# Patient Record
Sex: FEMALE | Race: WHITE | NOT HISPANIC OR LATINO | Employment: FULL TIME | URBAN - METROPOLITAN AREA
[De-identification: names, ages, dates, MRNs, and addresses within clinical notes are randomized per-mention and may not be internally consistent; named-entity substitution may affect disease eponyms.]

---

## 2017-02-22 ENCOUNTER — ALLSCRIPTS OFFICE VISIT (OUTPATIENT)
Dept: OTHER | Facility: OTHER | Age: 47
End: 2017-02-22

## 2017-02-28 RX ORDER — ALPRAZOLAM 0.25 MG/1
0.25 TABLET ORAL
COMMUNITY
End: 2018-12-07

## 2017-02-28 RX ORDER — MULTIVITAMIN
1 TABLET ORAL DAILY
COMMUNITY
End: 2020-06-30 | Stop reason: SDUPTHER

## 2017-03-09 ENCOUNTER — HOSPITAL ENCOUNTER (OUTPATIENT)
Facility: HOSPITAL | Age: 47
Setting detail: OUTPATIENT SURGERY
Discharge: HOME | End: 2017-03-09
Attending: SURGERY | Admitting: SURGERY
Payer: COMMERCIAL

## 2017-03-09 VITALS
WEIGHT: 180 LBS | SYSTOLIC BLOOD PRESSURE: 138 MMHG | HEART RATE: 82 BPM | TEMPERATURE: 98.7 F | BODY MASS INDEX: 28.25 KG/M2 | DIASTOLIC BLOOD PRESSURE: 76 MMHG | RESPIRATION RATE: 16 BRPM | OXYGEN SATURATION: 100 % | HEIGHT: 67 IN

## 2017-03-09 DIAGNOSIS — L72.9 FOLLICULAR CYST OF SKIN AND SUBCUTANEOUS TISSUE: ICD-10-CM

## 2017-03-09 PROCEDURE — 88304 TISSUE EXAM BY PATHOLOGIST: CPT | Performed by: SURGERY

## 2017-03-09 RX ORDER — LIDOCAINE HYDROCHLORIDE AND EPINEPHRINE 10; 10 MG/ML; UG/ML
1 INJECTION, SOLUTION INFILTRATION; PERINEURAL ONCE
Status: COMPLETED | OUTPATIENT
Start: 2017-03-09 | End: 2017-03-09

## 2017-03-09 RX ORDER — LIDOCAINE HYDROCHLORIDE AND EPINEPHRINE 10; 10 MG/ML; UG/ML
INJECTION, SOLUTION INFILTRATION; PERINEURAL
Status: DISCONTINUED
Start: 2017-03-09 | End: 2017-03-09 | Stop reason: HOSPADM

## 2017-03-17 ENCOUNTER — ALLSCRIPTS OFFICE VISIT (OUTPATIENT)
Dept: OTHER | Facility: OTHER | Age: 47
End: 2017-03-17

## 2017-03-24 ENCOUNTER — GENERIC CONVERSION - ENCOUNTER (OUTPATIENT)
Dept: OTHER | Facility: OTHER | Age: 47
End: 2017-03-24

## 2017-11-17 ENCOUNTER — LAB REQUISITION (OUTPATIENT)
Dept: LAB | Facility: HOSPITAL | Age: 47
End: 2017-11-17
Payer: COMMERCIAL

## 2017-11-17 ENCOUNTER — ALLSCRIPTS OFFICE VISIT (OUTPATIENT)
Dept: OTHER | Facility: OTHER | Age: 47
End: 2017-11-17

## 2017-11-17 DIAGNOSIS — R22.1 LOCALIZED SWELLING, MASS OR LUMP OF NECK: ICD-10-CM

## 2017-11-17 DIAGNOSIS — Z01.419 ENCOUNTER FOR GYNECOLOGICAL EXAMINATION WITHOUT ABNORMAL FINDING: ICD-10-CM

## 2017-11-17 DIAGNOSIS — Z12.31 ENCOUNTER FOR SCREENING MAMMOGRAM FOR MALIGNANT NEOPLASM OF BREAST: ICD-10-CM

## 2017-11-17 PROCEDURE — G0145 SCR C/V CYTO,THINLAYER,RESCR: HCPCS | Performed by: OBSTETRICS & GYNECOLOGY

## 2017-11-17 PROCEDURE — 87624 HPV HI-RISK TYP POOLED RSLT: CPT | Performed by: OBSTETRICS & GYNECOLOGY

## 2017-11-18 NOTE — PROGRESS NOTES
Assessment    1  Encounter for routine gynecological examination (2 31) (Z01 419)   2  Pap smear, as part of routine gynecological examination (V76 2) (Z01 419)   3  Visit for screening mammogram (V76 12) (Z12 31)    Plan  Encounter for routine gynecological examination    · Follow-up visit in 1 year Evaluation and Treatment  Follow-up  Status: Hold For -Scheduling  Requested for: [id number]   Ordered;Encounter for routine gynecological examination; Ordered By: Adiel Card Performed:  Due: [id number]  Neck nodule    · US THYROID; Status:Hold For - Scheduling,Retrospective By Protocol Authorization; Requested for:2017;    Perform:[facility]; [id number]; Last Updated [facility]; 2017 12:13:47 PM;Ordered;nodule; Ordered By:Padma Bazzi;  Pap smear, as part of routine gynecological examination    · (1) THIN PREP PAP WITH IMAGING; Status: In Progress - Specimen/DataCollected,Retrospective By Protocol Authorization;   Done: [id number]   Perform:[facility] In Office Collection; Order Comments:cervical, endocervical; HST:[id number]; Last Updated [facility]; 2017 11:13:12 AM;Ordered;smear, as part of routine gynecological examination; Ordered By:Padma Bazzi; Maturation index required? : No  : 10/26/2017  HPV? : Regardless of Interpretation  Visit for screening mammogram    · * MAMMO SCREENING BILATERAL W CAD; Status:Hold For - Scheduling,RetrospectiveBy Protocol Authorization; Requested for:2017;    Perform:[facility]; GEK:[id number]; Last Updated [facility]; 2017 11:13:12 AM;Ordered;for screening mammogram; Ordered By:Padma Bazzi; Discussion/Summary  healthy adult female Currently, she eats a healthy diet  cervical cancer screening is current Pap test was done today Breast cancer screening: monthly self breast exam was advised, mammogram is current and mammogram has been ordered   Advice and education were given regarding nutrition, aerobic exercise, calcium supplements, vitamin D supplements and sunscreen use  Normal GYN Exam  Stressed  ordered  Smear Done    Will check THYROID US for small nodule on the RIGHT Side of the neck  GYN Exam in 1 year  if any problems develop during the interim  The patient has the current Goals: [address]  The patent has the current Barriers: None  Patient is able to Self-Care  PATIENT EDUCATION RECORD She was given the following educational materials:  Ideas for a Healthy Life Style  The treatment plan was reviewed with the patient/guardian  The patient/guardian understands and agrees with the treatment plan     Self Referrals: No      Chief Complaint  ANNUAL EXAMhas no problem or concerns      History of Present Illness  HPI: Periods are 21-28 days    Some heavy PMS sxs  tolerable  Bowel and Bladder habits  pelvic or abdominal pain   GYN HM, Adult Female [facility]: The patient is being seen for a gynecology evaluation  The last health maintenance visit was 1 year(s) ago  General Health: The patient's health since the last visit is described as good  She has regular dental visits  -- She denies vision problems  -- She denies hearing loss  Lifestyle:  She consumes a diverse and healthy diet  -- She does not have any weight concerns  -- She exercises regularly  -- She does not use tobacco -- She denies drug use  Reproductive health: the patient is perimenopausal    Screening: cancer screening reviewed and current  metabolic screening reviewed and current  risk screening reviewed and current  Review of Systems   Constitutional: No fever, no chills, feels well, no tiredness, no recent weight gain or loss  ENT: no ear ache, no loss of hearing, no nosebleeds or nasal discharge, no sore throat or hoarseness  Cardiovascular: no complaints of slow or fast heart rate, no chest pain, no palpitations, no leg claudication or lower extremity edema    Respiratory: no complaints of shortness of breath, no wheezing, no dyspnea on exertion, no orthopnea or PND  Breasts: no complaints of breast pain, breast lump or nipple discharge  Gastrointestinal: no complaints of abdominal pain, no constipation, no nausea or diarrhea, no vomiting, no bloody stools  Genitourinary: no complaints of dysuria, no incontinence, no pelvic pain, no dysmenorrhea, no vaginal discharge or abnormal vaginal bleeding  Musculoskeletal: no complaints of arthralgia, no myalgia, no joint swelling or stiffness, no limb pain or swelling  Integumentary: no complaints of skin rash or lesion, no itching or dry skin, no skin wounds  Neurological: no complaints of headache, no confusion, no numbness or tingling, no dizziness or fainting  Active Problems    1  Back pain (724 5) (M54 9)   2  Cyst of skin (706 2) (L72 9)   3  Dysuria (788 1) (R30 0)   4  Encounter for routine gynecological examination (V72 31) (Z01 419)   5  Encounter for screening mammogram for malignant neoplasm of breast (V76 12) (Z12 31)   6  Endometrial hyperplasia (621 30) (N85 00)   7  Endometrial polyp (621 0) (N84 0)   8  Factor V Leiden (289 81) (D68 51)   9  Pap smear, as part of routine gynecological examination (V76 2) (Z01 419)   10  Pelvic pain in female (625 9) (R10 2)   11   Visit for screening mammogram (V76 12) (Z12 31)    Past Medical History     · History of Bulging Disc (L1 - L2) (722 10)   · History of Encounter for routine gynecological examination (V72 31) (Z01 419)   · History of Pap smear, as part of routine gynecological examination (V76 2) (Z01 419)   · History of Reflex sympathetic dystrophy of lower extremity (337 22) (G90 939)   · History of Reported Pap Smear   · History of Summary Of Previous Pregnancies  2  (Total No )   · History of Summary Of Previous Pregnancies Para 2  (Deliveries)    Surgical History   · History of  Section   · History of Foot Surgery   · History of Oral Surgery Tooth Extraction   · History of Tubal Ligation    Family History  Brother    · Family history of Thyroid cancer  Family History    · Family history of Breast Cancer (V16 3)   · Family history of Diverticulosis Of Intestine   · Family history of Osteoporosis (V17 81)   · Family history of Stroke Syndrome (V17 1)    Social History     · Being A Social Drinker   · Former smoker (F14 59) (M14 981)   · Quit Smoking in 2002  Larayne Chroman Smoked for 5 years, college years  Larayne Chroman Larayne Chroman Social, Less than a pack of    cigarettes a day   · Denied: History of Home Environment Domestic Violence    Current Meds   1  Zoloft TABS; Therapy: (Recorded:17Nov2017) to Recorded    Allergies  1  Erythromycin TABS    Vitals   Recorded: 50BRS9738 00:94PN   Systolic 624   Diastolic 72   Height 5 ft 7 in   Weight 195 lb    BMI Calculated 30 54   BSA Calculated 2       Physical Exam   Constitutional  General appearance: No acute distress, well appearing and well nourished  Neck  Neck: Normal, supple, trachea midline, no masses  -- Small Nodule on the RIGHT Side  Thyroid: Normal, no thyromegaly  Pulmonary  Respiratory effort: No increased work of breathing or signs of respiratory distress  Auscultation of lungs: Clear to auscultation  Cardiovascular  Auscultation of heart: Normal rate and rhythm, normal S1 and S2, no murmurs  Peripheral vascular exam: Normal pulses Throughout  Genitourinary  External genitalia: Normal and no lesions appreciated  Vagina: Normal, no lesions or dryness appreciated  Urethra: Normal    Urethral meatus: Normal    Bladder: Normal, soft, non-tender and no prolapse or masses appreciated  Cervix: Normal, no palpable masses  Uterus: Normal, non-tender, not enlarged, and no palpable masses  Adnexa/parametria: Normal, non-tender and no fullness or masses appreciated  Anus, perineum, and rectum: Normal sphincter tone, no masses, and no prolapse  Chest  Breasts: Normal and no dimpling or skin changes noted     Abdomen  Abdomen: Normal, non-tender, and no organomegaly noted  Liver and spleen: No hepatomegaly or splenomegaly  Examination for hernias: No hernias appreciated  Stool sample for occult blood: Negative  Lymphatic  Palpation of lymph nodes in neck, axillae, groin and/or other locations: No lymphadenopathy or masses noted  Skin  Skin and subcutaneous tissue: Normal skin turgor and no rashes     Palpation of skin and subcutaneous tissue: Normal    Psychiatric  Orientation to person, place, and time: Normal    Mood and affect: Normal        Provider Comments  Provider Comments:   Family doing well    Oldest is 15 Next 6      Signatures   Electronically signed by : ELISABETH Schroeder ; Nov 17 2017  1:28PM EST                       (Author)

## 2017-11-22 ENCOUNTER — HOSPITAL ENCOUNTER (OUTPATIENT)
Dept: RADIOLOGY | Facility: HOSPITAL | Age: 47
Discharge: HOME | End: 2017-11-22
Attending: OBSTETRICS & GYNECOLOGY
Payer: COMMERCIAL

## 2017-11-22 DIAGNOSIS — R22.1 LOCALIZED SWELLING, MASS OR LUMP OF NECK: ICD-10-CM

## 2017-11-22 LAB — HPV RRNA GENITAL QL NAA+PROBE: NORMAL

## 2017-11-22 PROCEDURE — 76536 US EXAM OF HEAD AND NECK: CPT

## 2017-11-27 LAB
LAB AP GYN PRIMARY INTERPRETATION: NORMAL
LAB AP LMP: NORMAL
Lab: NORMAL
PATH INTERP SPEC-IMP: NORMAL

## 2017-12-08 ENCOUNTER — HOSPITAL ENCOUNTER (OUTPATIENT)
Dept: MAMMOGRAPHY | Facility: CLINIC | Age: 47
Discharge: HOME | End: 2017-12-08
Payer: COMMERCIAL

## 2017-12-08 DIAGNOSIS — Z12.31 ENCOUNTER FOR SCREENING MAMMOGRAM FOR MALIGNANT NEOPLASM OF BREAST: ICD-10-CM

## 2017-12-08 PROCEDURE — G0202 SCR MAMMO BI INCL CAD: HCPCS

## 2017-12-08 PROCEDURE — 77063 BREAST TOMOSYNTHESIS BI: CPT

## 2018-01-14 VITALS — WEIGHT: 168.4 LBS | HEIGHT: 67 IN | BODY MASS INDEX: 26.43 KG/M2

## 2018-01-14 VITALS
SYSTOLIC BLOOD PRESSURE: 126 MMHG | HEIGHT: 67 IN | BODY MASS INDEX: 30.61 KG/M2 | DIASTOLIC BLOOD PRESSURE: 72 MMHG | WEIGHT: 195 LBS

## 2018-01-16 NOTE — MISCELLANEOUS
Message  Spoke with patient regarding pelvic U/S results  Per Dr Beto Lemos        Signatures   Electronically signed by : Chaneta Apgar, [facility]; 2016 12:59PM EST                       (Author)

## 2018-12-07 ENCOUNTER — ANNUAL EXAM (OUTPATIENT)
Dept: OBGYN CLINIC | Facility: CLINIC | Age: 48
End: 2018-12-07
Payer: COMMERCIAL

## 2018-12-07 VITALS
HEIGHT: 67 IN | DIASTOLIC BLOOD PRESSURE: 74 MMHG | BODY MASS INDEX: 32.02 KG/M2 | SYSTOLIC BLOOD PRESSURE: 116 MMHG | WEIGHT: 204 LBS

## 2018-12-07 DIAGNOSIS — Z12.39 BREAST CANCER SCREENING: ICD-10-CM

## 2018-12-07 DIAGNOSIS — Z01.419 PAP SMEAR, AS PART OF ROUTINE GYNECOLOGICAL EXAMINATION: ICD-10-CM

## 2018-12-07 DIAGNOSIS — Z01.419 ENCOUNTER FOR GYNECOLOGICAL EXAMINATION: Primary | ICD-10-CM

## 2018-12-07 PROCEDURE — S0612 ANNUAL GYNECOLOGICAL EXAMINA: HCPCS | Performed by: OBSTETRICS & GYNECOLOGY

## 2018-12-07 RX ORDER — FAMOTIDINE 40 MG/1
40 TABLET, FILM COATED ORAL 2 TIMES DAILY
Refills: 5 | COMMUNITY
Start: 2018-11-30 | End: 2021-06-28

## 2018-12-07 RX ORDER — TOBRAMYCIN AND DEXAMETHASONE 3; 1 MG/ML; MG/ML
SUSPENSION/ DROPS OPHTHALMIC
Refills: 0 | COMMUNITY
Start: 2018-11-28 | End: 2022-03-15

## 2018-12-07 RX ORDER — CLINDAMYCIN PHOSPHATE 10 UG/ML
LOTION TOPICAL
Refills: 3 | COMMUNITY
Start: 2018-11-21

## 2018-12-07 NOTE — PROGRESS NOTES
Assessment/Plan:    No problem-specific Assessment & Plan notes found for this encounter  Normal gynecological physical examination  Self-breast examination stressed  Mammogram ordered  Discussed regular exercise, healthy diet, importance of vitamin D and calcium supplements  Discussed importance of sun block use during periods of prolonged sun exposure  Patient will be seen in 1 year for routine gynecologic and medical examination  Patient will call office for any problems, concerns, or issues which may arise during the interim  Diagnoses and all orders for this visit:    Encounter for gynecological examination    Pap smear, as part of routine gynecological examination  -     Thinprep Pap (Refl) HPV mRNA E6/E7    Breast cancer screening  -     Cancel: Mammo screening bilateral w cad; Future  -     Mammo screening bilateral w 3d & cad; Future    Other orders  -     clindamycin (CLEOCIN T) 1 % lotion; APPLY 1 TO 2 TIMES A DAY TO AFFECTED AREAS ON BACK/FACE  -     famotidine (PEPCID) 40 MG tablet; Take 40 mg by mouth 2 (two) times a day  -     Multiple Vitamin (MULTI-12 IV); Take 1 tablet by mouth  -     sertraline (ZOLOFT) 50 mg tablet; Take 50 mg by mouth daily  -     tobramycin-dexamethasone (TOBRADEX) ophthalmic suspension; PUT 1 DROP INTO RIGHT EYE 3 TIMES A DAY        Subjective:      Patient ID: Benjiman Duverney is a 57 y o  female      Patient is a 51-year-old female who presents today for her annual gynecologic medical examination    Patient reports that her periods or 21-28 days in cycle    She reports that they vary from heavy to light    She denies any erratic intermenstrual bleeding however    She also denies any significant dysmenorrhea    Patient reports normal bowel and bladder habits    She denies any significant pelvic or abdominal pain    She reports no medical problems at this time    Patient does regular self-breast examinations and is up-to-date with screening mammography            The following portions of the patient's history were reviewed and updated as appropriate: allergies, current medications, past family history, past medical history, past social history, past surgical history and problem list     Review of Systems   Constitutional: Negative  HENT: Negative  Eyes: Negative  Respiratory: Negative  Cardiovascular: Negative  Gastrointestinal: Negative  Endocrine: Negative  Genitourinary: Negative  Musculoskeletal: Negative  Skin: Negative  Neurological: Negative  Psychiatric/Behavioral: Negative  Objective:      /74 (BP Location: Right arm, Patient Position: Sitting, Cuff Size: Large)   Ht 5' 7" (1 702 m)   Wt 92 5 kg (204 lb)   LMP 11/29/2018 (Exact Date)   BMI 31 95 kg/m²          Physical Exam   Constitutional: She appears well-developed  HENT:   Head: Normocephalic  Eyes: Pupils are equal, round, and reactive to light  Neck: Normal range of motion  Neck supple  Cardiovascular: Normal rate and regular rhythm  Pulmonary/Chest: Effort normal and breath sounds normal  Right breast exhibits no inverted nipple, no mass, no nipple discharge, no skin change and no tenderness  Left breast exhibits no inverted nipple, no mass, no nipple discharge, no skin change and no tenderness  Breasts are symmetrical    Abdominal: Soft  Normal appearance and bowel sounds are normal    Genitourinary: Rectum normal, vagina normal and uterus normal  Rectal exam shows guaiac negative stool  Pelvic exam was performed with patient supine  Right adnexum displays no mass, no tenderness and no fullness  Left adnexum displays no mass, no tenderness and no fullness  No vaginal discharge found  Lymphadenopathy:     She has no cervical adenopathy  She has no axillary adenopathy  Right: No inguinal and no supraclavicular adenopathy present  Left: No inguinal and no supraclavicular adenopathy present     Neurological: She is alert  Skin: Skin is intact  No rash noted  Psychiatric: She has a normal mood and affect   Her speech is normal and behavior is normal  Judgment and thought content normal  Cognition and memory are normal

## 2018-12-14 LAB
CLINICAL INFO: NORMAL
CYTO CVX: NORMAL
DATE PREVIOUS BX: NORMAL
LMP START DATE: NORMAL
MICROORGANISM CVX/VAG CYTO: NORMAL
SL AMB PREV. PAP:: NORMAL
SPECIMEN SOURCE CVX/VAG CYTO: NORMAL

## 2019-01-03 ENCOUNTER — TELEPHONE (OUTPATIENT)
Dept: OBGYN CLINIC | Facility: CLINIC | Age: 49
End: 2019-01-03

## 2019-01-03 NOTE — TELEPHONE ENCOUNTER
Patient informed of pap results and presence of yeast, patient has some symptoms, if returns will treat with monistat as recommended

## 2019-01-03 NOTE — TELEPHONE ENCOUNTER
----- Message from Romi Bridges MD sent at 1/3/2019  2:32 PM EST -----  Pap smear is negative    Yeast is present however    Patient may use Monistat cream if she has symptoms    Thanks

## 2019-03-29 ENCOUNTER — HOSPITAL ENCOUNTER (OUTPATIENT)
Dept: MAMMOGRAPHY | Facility: CLINIC | Age: 49
Discharge: HOME | End: 2019-03-29
Payer: COMMERCIAL

## 2019-03-29 DIAGNOSIS — Z12.39 BREAST CANCER SCREENING: ICD-10-CM

## 2019-03-29 PROCEDURE — 77067 SCR MAMMO BI INCL CAD: CPT

## 2019-03-29 PROCEDURE — 77063 BREAST TOMOSYNTHESIS BI: CPT

## 2020-01-24 ENCOUNTER — ANNUAL EXAM (OUTPATIENT)
Dept: OBGYN CLINIC | Facility: CLINIC | Age: 50
End: 2020-01-24
Payer: COMMERCIAL

## 2020-01-24 VITALS — DIASTOLIC BLOOD PRESSURE: 76 MMHG | BODY MASS INDEX: 32.58 KG/M2 | SYSTOLIC BLOOD PRESSURE: 120 MMHG | WEIGHT: 208 LBS

## 2020-01-24 DIAGNOSIS — Z12.39 SCREENING FOR BREAST CANCER: Primary | ICD-10-CM

## 2020-01-24 DIAGNOSIS — Z01.419 PAP SMEAR, AS PART OF ROUTINE GYNECOLOGICAL EXAMINATION: ICD-10-CM

## 2020-01-24 DIAGNOSIS — Z01.419 ENCOUNTER FOR GYNECOLOGICAL EXAMINATION WITHOUT ABNORMAL FINDING: ICD-10-CM

## 2020-01-24 PROCEDURE — G0145 SCR C/V CYTO,THINLAYER,RESCR: HCPCS | Performed by: OBSTETRICS & GYNECOLOGY

## 2020-01-24 PROCEDURE — 99396 PREV VISIT EST AGE 40-64: CPT | Performed by: OBSTETRICS & GYNECOLOGY

## 2020-01-24 NOTE — PROGRESS NOTES
Assessment/Plan:    No problem-specific Assessment & Plan notes found for this encounter  Normal gynecological physical examination  Self-breast examination stressed  Mammogram ordered  Discussed regular exercise, healthy diet, importance of vitamin D and calcium supplements  Discussed importance of sun block use during periods of prolonged sun exposure  Patient will be seen in 1 year for routine gynecologic and medical examination  Patient will call office for any problems, concerns, or issues which may arise during the interim  Diagnoses and all orders for this visit:    Screening for breast cancer  -     Cancel: Mammo screening bilateral w cad; Future  -     Mammo screening bilateral w 3d & cad; Future    Encounter for gynecological examination without abnormal finding  -     Liquid-based pap, screening    Pap smear, as part of routine gynecological examination    Other orders  -     Multiple Vitamins-Minerals (MULTIVITAMIN ADULT PO); multivitamin  -     Ascorbic Acid 100 MG CHEW; Chew 1,500 mg daily        Subjective:      Patient ID: Angela Bates is a 54 y o  female  Patient is a 82-year-old female who presents today for her annual gynecologic and medical examination    Patient reports that her periods are regular and on time and accompanied by fairly heavy flow with occasional clots for the 1st several days  She denies any significant pain or cramping however  She also denies any erratic spotting or bleeding between cycles as well  Patient reports normal bowel and bladder habits    She denies any significant pelvic or abdominal pain    She also denies any significant headaches, chest pain, shortness of breath, fever, shakes or chills  She does get occasional symptoms consistent with gastritis which is relieved with Pepcid  She has a history of factor 5 Leiden mutation    Currently she is not being followed for any significant medical problems however      Patient was told the importance of regular self-breast examination and she is up-to-date with screening mammography  Arrangements for her annual mammogram with 3D imaging was placed into the EMR system at today's visit  The following portions of the patient's history were reviewed and updated as appropriate: allergies, current medications, past family history, past medical history, past social history, past surgical history and problem list     Review of Systems   Constitutional: Negative  Negative for appetite change, diaphoresis, fatigue, fever and unexpected weight change  HENT: Negative  Eyes: Negative  Respiratory: Negative  Cardiovascular: Negative  Gastrointestinal: Negative  Negative for abdominal pain, blood in stool, constipation, diarrhea, nausea and vomiting  Endocrine: Negative  Negative for cold intolerance and heat intolerance  Genitourinary: Negative  Negative for dysuria, frequency, hematuria, urgency, vaginal bleeding, vaginal discharge and vaginal pain  Musculoskeletal: Negative  Skin: Negative  Allergic/Immunologic: Negative  Neurological: Negative  Hematological: Negative  Negative for adenopathy  History of factor 5 Leiden mutation   Psychiatric/Behavioral: Negative  Objective:      /76   Wt 94 3 kg (208 lb)   LMP 01/12/2020   BMI 32 58 kg/m²          Physical Exam   Constitutional: She is oriented to person, place, and time  She appears well-developed and well-nourished  HENT:   Head: Normocephalic  Eyes: Pupils are equal, round, and reactive to light  Neck: Normal range of motion  Neck supple  Cardiovascular: Normal rate and regular rhythm  Pulmonary/Chest: Effort normal and breath sounds normal  Right breast exhibits no inverted nipple, no mass, no nipple discharge, no skin change and no tenderness  Left breast exhibits no inverted nipple, no mass, no nipple discharge, no skin change and no tenderness   Breasts are symmetrical  Abdominal: Soft  Normal appearance and bowel sounds are normal    Genitourinary: Rectum normal, vagina normal and uterus normal  Rectal exam shows guaiac negative stool  Pelvic exam was performed with patient supine  There is no rash or lesion on the right labia  There is no rash or lesion on the left labia  Uterus is not enlarged and not tender  Cervix exhibits no discharge and no friability  Right adnexum displays no mass, no tenderness and no fullness  Left adnexum displays no mass, no tenderness and no fullness  No erythema, tenderness or bleeding in the vagina  No vaginal discharge found  Musculoskeletal: Normal range of motion  Lymphadenopathy:     She has no cervical adenopathy  She has no axillary adenopathy  Right: No inguinal and no supraclavicular adenopathy present  Left: No inguinal and no supraclavicular adenopathy present  Neurological: She is alert and oriented to person, place, and time  Skin: Skin is warm, dry and intact  No rash noted  Psychiatric: She has a normal mood and affect   Her speech is normal and behavior is normal  Judgment and thought content normal  Cognition and memory are normal

## 2020-01-29 LAB
LAB AP GYN PRIMARY INTERPRETATION: NORMAL
Lab: NORMAL

## 2020-06-22 ENCOUNTER — HOSPITAL ENCOUNTER (EMERGENCY)
Facility: HOSPITAL | Age: 50
Discharge: HOME | End: 2020-06-22
Attending: EMERGENCY MEDICINE | Admitting: EMERGENCY MEDICINE
Payer: COMMERCIAL

## 2020-06-22 VITALS
HEART RATE: 78 BPM | TEMPERATURE: 97.6 F | DIASTOLIC BLOOD PRESSURE: 69 MMHG | RESPIRATION RATE: 16 BRPM | SYSTOLIC BLOOD PRESSURE: 154 MMHG | OXYGEN SATURATION: 100 % | BODY MASS INDEX: 32.46 KG/M2 | WEIGHT: 207.23 LBS

## 2020-06-22 DIAGNOSIS — R03.0 ELEVATED BLOOD PRESSURE READING: Primary | ICD-10-CM

## 2020-06-22 LAB
ANION GAP SERPL CALCULATED.3IONS-SCNC: 9 MMOL/L (ref 4–13)
BASOPHILS # BLD AUTO: 0.02 THOUSANDS/ΜL (ref 0–0.1)
BASOPHILS NFR BLD AUTO: 0 % (ref 0–1)
BUN SERPL-MCNC: 13 MG/DL (ref 5–25)
CALCIUM SERPL-MCNC: 9.5 MG/DL (ref 8.3–10.1)
CHLORIDE SERPL-SCNC: 104 MMOL/L (ref 100–108)
CO2 SERPL-SCNC: 27 MMOL/L (ref 21–32)
CREAT SERPL-MCNC: 0.93 MG/DL (ref 0.6–1.3)
EOSINOPHIL # BLD AUTO: 0.04 THOUSAND/ΜL (ref 0–0.61)
EOSINOPHIL NFR BLD AUTO: 1 % (ref 0–6)
ERYTHROCYTE [DISTWIDTH] IN BLOOD BY AUTOMATED COUNT: 15.2 % (ref 11.6–15.1)
GFR SERPL CREATININE-BSD FRML MDRD: 73 ML/MIN/1.73SQ M
GLUCOSE SERPL-MCNC: 104 MG/DL (ref 65–140)
HCT VFR BLD AUTO: 36.9 % (ref 34.8–46.1)
HGB BLD-MCNC: 11.6 G/DL (ref 11.5–15.4)
IMM GRANULOCYTES # BLD AUTO: 0.02 THOUSAND/UL (ref 0–0.2)
IMM GRANULOCYTES NFR BLD AUTO: 0 % (ref 0–2)
LYMPHOCYTES # BLD AUTO: 1.51 THOUSANDS/ΜL (ref 0.6–4.47)
LYMPHOCYTES NFR BLD AUTO: 19 % (ref 14–44)
MCH RBC QN AUTO: 29 PG (ref 26.8–34.3)
MCHC RBC AUTO-ENTMCNC: 31.4 G/DL (ref 31.4–37.4)
MCV RBC AUTO: 92 FL (ref 82–98)
MONOCYTES # BLD AUTO: 0.38 THOUSAND/ΜL (ref 0.17–1.22)
MONOCYTES NFR BLD AUTO: 5 % (ref 4–12)
NEUTROPHILS # BLD AUTO: 5.95 THOUSANDS/ΜL (ref 1.85–7.62)
NEUTS SEG NFR BLD AUTO: 75 % (ref 43–75)
NRBC BLD AUTO-RTO: 0 /100 WBCS
PLATELET # BLD AUTO: 309 THOUSANDS/UL (ref 149–390)
PMV BLD AUTO: 11.2 FL (ref 8.9–12.7)
POTASSIUM SERPL-SCNC: 3.8 MMOL/L (ref 3.5–5.3)
RBC # BLD AUTO: 4 MILLION/UL (ref 3.81–5.12)
SODIUM SERPL-SCNC: 140 MMOL/L (ref 136–145)
TROPONIN I SERPL-MCNC: <0.02 NG/ML
WBC # BLD AUTO: 7.92 THOUSAND/UL (ref 4.31–10.16)

## 2020-06-22 PROCEDURE — 93005 ELECTROCARDIOGRAM TRACING: CPT

## 2020-06-22 PROCEDURE — 85025 COMPLETE CBC W/AUTO DIFF WBC: CPT | Performed by: PHYSICIAN ASSISTANT

## 2020-06-22 PROCEDURE — 36415 COLL VENOUS BLD VENIPUNCTURE: CPT | Performed by: PHYSICIAN ASSISTANT

## 2020-06-22 PROCEDURE — 99282 EMERGENCY DEPT VISIT SF MDM: CPT | Performed by: PHYSICIAN ASSISTANT

## 2020-06-22 PROCEDURE — 84484 ASSAY OF TROPONIN QUANT: CPT | Performed by: PHYSICIAN ASSISTANT

## 2020-06-22 PROCEDURE — 80048 BASIC METABOLIC PNL TOTAL CA: CPT | Performed by: PHYSICIAN ASSISTANT

## 2020-06-22 PROCEDURE — 99284 EMERGENCY DEPT VISIT MOD MDM: CPT

## 2020-06-23 LAB
ATRIAL RATE: 90 BPM
P AXIS: 59 DEGREES
PR INTERVAL: 174 MS
QRS AXIS: 63 DEGREES
QRSD INTERVAL: 86 MS
QT INTERVAL: 344 MS
QTC INTERVAL: 420 MS
T WAVE AXIS: 50 DEGREES
VENTRICULAR RATE: 90 BPM

## 2020-06-23 PROCEDURE — 93010 ELECTROCARDIOGRAM REPORT: CPT | Performed by: INTERNAL MEDICINE

## 2020-06-30 ENCOUNTER — OFFICE VISIT (OUTPATIENT)
Dept: CARDIOLOGY CLINIC | Facility: CLINIC | Age: 50
End: 2020-06-30
Payer: COMMERCIAL

## 2020-06-30 VITALS
WEIGHT: 199.3 LBS | DIASTOLIC BLOOD PRESSURE: 100 MMHG | HEART RATE: 93 BPM | SYSTOLIC BLOOD PRESSURE: 158 MMHG | OXYGEN SATURATION: 98 % | BODY MASS INDEX: 31.21 KG/M2 | TEMPERATURE: 97.3 F

## 2020-06-30 DIAGNOSIS — I10 HTN (HYPERTENSION), BENIGN: ICD-10-CM

## 2020-06-30 DIAGNOSIS — R00.2 PALPITATIONS: Primary | ICD-10-CM

## 2020-06-30 PROCEDURE — 99214 OFFICE O/P EST MOD 30 MIN: CPT | Performed by: INTERNAL MEDICINE

## 2020-06-30 PROCEDURE — 93000 ELECTROCARDIOGRAM COMPLETE: CPT | Performed by: INTERNAL MEDICINE

## 2020-06-30 RX ORDER — ATENOLOL 25 MG/1
25 TABLET ORAL DAILY
Qty: 30 TABLET | Refills: 5 | Status: SHIPPED | OUTPATIENT
Start: 2020-06-30 | End: 2020-07-14 | Stop reason: ALTCHOICE

## 2020-07-13 DIAGNOSIS — F41.0 ANXIETY ATTACK: Primary | ICD-10-CM

## 2020-07-13 RX ORDER — ALPRAZOLAM 0.25 MG/1
0.25 TABLET ORAL DAILY PRN
Qty: 20 TABLET | Refills: 0 | Status: SHIPPED | OUTPATIENT
Start: 2020-07-13

## 2020-07-14 DIAGNOSIS — I10 HTN (HYPERTENSION), BENIGN: Primary | ICD-10-CM

## 2020-07-14 RX ORDER — LOSARTAN POTASSIUM 25 MG/1
25 TABLET ORAL DAILY
Qty: 30 TABLET | Refills: 3 | Status: SHIPPED | OUTPATIENT
Start: 2020-07-14

## 2020-07-15 ENCOUNTER — HOSPITAL ENCOUNTER (OUTPATIENT)
Dept: MAMMOGRAPHY | Facility: MEDICAL CENTER | Age: 50
Discharge: HOME | End: 2020-07-15
Payer: COMMERCIAL

## 2020-07-15 VITALS — WEIGHT: 199 LBS | HEIGHT: 67 IN | BODY MASS INDEX: 31.23 KG/M2

## 2020-07-15 DIAGNOSIS — Z12.39 SCREENING FOR BREAST CANCER: ICD-10-CM

## 2020-07-15 PROCEDURE — 77063 BREAST TOMOSYNTHESIS BI: CPT

## 2020-07-15 PROCEDURE — 77067 SCR MAMMO BI INCL CAD: CPT

## 2020-07-16 ENCOUNTER — APPOINTMENT (OUTPATIENT)
Dept: LAB | Facility: MEDICAL CENTER | Age: 50
End: 2020-07-16
Payer: COMMERCIAL

## 2020-07-16 DIAGNOSIS — I10 HTN (HYPERTENSION), BENIGN: ICD-10-CM

## 2020-07-16 LAB
ALBUMIN SERPL BCP-MCNC: 3.7 G/DL (ref 3.5–5)
ALP SERPL-CCNC: 58 U/L (ref 46–116)
ALT SERPL W P-5'-P-CCNC: 46 U/L (ref 12–78)
ANION GAP SERPL CALCULATED.3IONS-SCNC: 6 MMOL/L (ref 4–13)
AST SERPL W P-5'-P-CCNC: 16 U/L (ref 5–45)
BILIRUB SERPL-MCNC: 0.59 MG/DL (ref 0.2–1)
BUN SERPL-MCNC: 10 MG/DL (ref 5–25)
CALCIUM SERPL-MCNC: 9.1 MG/DL (ref 8.3–10.1)
CHLORIDE SERPL-SCNC: 107 MMOL/L (ref 100–108)
CHOLEST SERPL-MCNC: 268 MG/DL (ref 50–200)
CO2 SERPL-SCNC: 26 MMOL/L (ref 21–32)
CORTIS SERPL-MCNC: 22.2 UG/DL
CREAT SERPL-MCNC: 0.81 MG/DL (ref 0.6–1.3)
GFR SERPL CREATININE-BSD FRML MDRD: 87 ML/MIN/1.73SQ M
GLUCOSE P FAST SERPL-MCNC: 89 MG/DL (ref 65–99)
HDLC SERPL-MCNC: 44 MG/DL
LDLC SERPL CALC-MCNC: 187 MG/DL (ref 0–100)
NONHDLC SERPL-MCNC: 224 MG/DL
POTASSIUM SERPL-SCNC: 4.5 MMOL/L (ref 3.5–5.3)
PROT SERPL-MCNC: 7.2 G/DL (ref 6.4–8.2)
SODIUM SERPL-SCNC: 139 MMOL/L (ref 136–145)
TRIGL SERPL-MCNC: 184 MG/DL

## 2020-07-16 PROCEDURE — 83835 ASSAY OF METANEPHRINES: CPT | Performed by: INTERNAL MEDICINE

## 2020-07-16 PROCEDURE — 82533 TOTAL CORTISOL: CPT

## 2020-07-16 PROCEDURE — 80061 LIPID PANEL: CPT | Performed by: INTERNAL MEDICINE

## 2020-07-16 PROCEDURE — 36415 COLL VENOUS BLD VENIPUNCTURE: CPT | Performed by: INTERNAL MEDICINE

## 2020-07-16 PROCEDURE — 82088 ASSAY OF ALDOSTERONE: CPT

## 2020-07-16 PROCEDURE — 80053 COMPREHEN METABOLIC PANEL: CPT | Performed by: INTERNAL MEDICINE

## 2020-07-16 PROCEDURE — 84244 ASSAY OF RENIN: CPT

## 2020-07-22 LAB
METANEPH FREE SERPL-MCNC: 33.5 PG/ML (ref 0–88)
NORMETANEPHRINE SERPL-MCNC: 65.5 PG/ML (ref 0–125.8)

## 2020-09-10 ENCOUNTER — TRANSCRIBE ORDERS (OUTPATIENT)
Dept: ADMINISTRATIVE | Facility: HOSPITAL | Age: 50
End: 2020-09-10

## 2020-09-10 DIAGNOSIS — G57.32 LESION OF LATERAL POPLITEAL NERVE, LEFT LOWER LIMB: Primary | ICD-10-CM

## 2020-09-30 ENCOUNTER — APPOINTMENT (OUTPATIENT)
Dept: RADIOLOGY | Facility: MEDICAL CENTER | Age: 50
End: 2020-09-30
Payer: COMMERCIAL

## 2020-09-30 ENCOUNTER — TRANSCRIBE ORDERS (OUTPATIENT)
Dept: ADMINISTRATIVE | Facility: HOSPITAL | Age: 50
End: 2020-09-30

## 2020-09-30 DIAGNOSIS — M99.04 SOMATIC DYSFUNCTION OF SACRAL REGION: ICD-10-CM

## 2020-09-30 DIAGNOSIS — M99.03 SOMATIC DYSFUNCTION OF LUMBAR REGION: ICD-10-CM

## 2020-09-30 DIAGNOSIS — M99.01 CERVICAL SEGMENT DYSFUNCTION: ICD-10-CM

## 2020-09-30 DIAGNOSIS — M99.01 CERVICAL SEGMENT DYSFUNCTION: Primary | ICD-10-CM

## 2020-09-30 PROCEDURE — 72050 X-RAY EXAM NECK SPINE 4/5VWS: CPT

## 2020-09-30 PROCEDURE — 72170 X-RAY EXAM OF PELVIS: CPT

## 2020-09-30 PROCEDURE — 72100 X-RAY EXAM L-S SPINE 2/3 VWS: CPT

## 2021-02-18 DIAGNOSIS — Z23 ENCOUNTER FOR IMMUNIZATION: ICD-10-CM

## 2021-02-19 ENCOUNTER — OFFICE VISIT (OUTPATIENT)
Dept: URGENT CARE | Facility: MEDICAL CENTER | Age: 51
End: 2021-02-19
Payer: COMMERCIAL

## 2021-02-19 VITALS
HEART RATE: 124 BPM | BODY MASS INDEX: 28.25 KG/M2 | RESPIRATION RATE: 20 BRPM | WEIGHT: 180 LBS | TEMPERATURE: 96.1 F | OXYGEN SATURATION: 100 % | HEIGHT: 67 IN

## 2021-02-19 DIAGNOSIS — Z11.59 SPECIAL SCREENING EXAMINATION FOR UNSPECIFIED VIRAL DISEASE: Primary | ICD-10-CM

## 2021-02-19 DIAGNOSIS — J06.9 ACUTE URI: ICD-10-CM

## 2021-02-19 DIAGNOSIS — H69.93 DISORDER OF BOTH EUSTACHIAN TUBES: ICD-10-CM

## 2021-02-19 PROCEDURE — U0003 INFECTIOUS AGENT DETECTION BY NUCLEIC ACID (DNA OR RNA); SEVERE ACUTE RESPIRATORY SYNDROME CORONAVIRUS 2 (SARS-COV-2) (CORONAVIRUS DISEASE [COVID-19]), AMPLIFIED PROBE TECHNIQUE, MAKING USE OF HIGH THROUGHPUT TECHNOLOGIES AS DESCRIBED BY CMS-2020-01-R: HCPCS | Performed by: PHYSICIAN ASSISTANT

## 2021-02-19 PROCEDURE — U0005 INFEC AGEN DETEC AMPLI PROBE: HCPCS | Performed by: PHYSICIAN ASSISTANT

## 2021-02-19 RX ORDER — SERTRALINE HYDROCHLORIDE 20 MG/ML
25 SOLUTION ORAL DAILY
COMMUNITY
End: 2022-03-15

## 2021-02-20 LAB — SARS-COV-2 RNA RESP QL NAA+PROBE: NEGATIVE

## 2021-02-20 NOTE — PROGRESS NOTES
[facility]    NAME: Gemma Fuller is a 52 y o  female  : 1970    MRN: [id number]  DATE: 2021  TIME: 7:59 PM    Assessment and Plan   Special screening examination for unspecified viral disease [Z11 59]  1  Special screening examination for unspecified viral disease  Novel Coronavirus (Covid-19),PCR [facility] [place] - Office Collection   2  Acute URI     3  Disorder of both eustachian tubes         Patient Instructions   Patient Instructions     COVID testing initiated  Results take approximately 2-4 days to return  Results automatically get posted to your my chart account  If provider sees that you have not access your account and obtained a results, will call you on Tuesday with your results  In the meantime recommend nasal saline spray each nostril every 1-2 hours while awake to help moisturize and shrink any sinus passages and help thin any mucus that may be aggravating back of throat and eustachian tubes  May also do Flonase once a day  May continue your Claritin  Provider likes the Mucinex D 12 hour decongestant expectorant for daytime use only  Recommend 1/2 tablet in the morning to help decrease  Sinus pressure, ear pressure  Stay well hydrated  Follow-up with family doctor if not improving over the next 7-10 days  Chief Complaint     Chief Complaint   Patient presents with    Eye Pain     Patient states she has post nasal drip and ear and sinus pressure       History of Present Illness   Gemma Fuller presents to the clinic c/o    62-year-old female comes in with discomfort and pressure in her ears as well as sinuses with postnasal drip  She feels a pressure like there is fluid in her ears and a constant postnasal drip  It has been worse the last 3 days  She has been taking Claritin and Tylenol  She is concerned because she was just spending a lot of time at the hospital where her dad was inpatient for several days with sepsis        She has a history of prior sinus infections and was treated with a couple different antibiotics as well as Flonase  She ended up developing a yeast infection in her aside off against       As a rule she does not tolerate decongestants due to elevated heart rate  She also does not tolerate prednisone  Review of Systems   Review of Systems   Constitutional: Negative  HENT: Positive for ear pain, postnasal drip and sinus pressure  Negative for congestion, ear discharge and sinus pain  Eyes: Negative  Respiratory: Negative          Current Medications     Long-Term Medications   Medication Sig Dispense Refill    Ascorbic Acid 100 MG CHEW Chew 1,500 mg daily      clindamycin (CLEOCIN T) 1 % lotion APPLY 1 TO 2 TIMES A DAY TO AFFECTED AREAS ON BACK/FACE  3    famotidine (PEPCID) 40 MG tablet Take 40 mg by mouth 2 (two) times a day  5    tobramycin-dexamethasone (TOBRADEX) ophthalmic suspension PUT 1 DROP INTO RIGHT EYE 3 TIMES A DAY  0    ALPRAZolam (XANAX) 0 25 mg tablet Take 1 tablet (0 25 mg total) by mouth daily as needed for anxiety (Patient not taking: Reported on 2/19/2021) 20 tablet 0    losartan (COZAAR) 25 mg tablet Take 1 tablet (25 mg total) by mouth daily (Patient not taking: Reported on 2/19/2021) 30 tablet 3    sucralfate (CARAFATE) 1 g/10 mL suspension Take 10 mL (1 g total) by mouth 4 (four) times a day 1200 mL 2       Current Allergies     Allergies as of 02/19/2021 - Reviewed 02/19/2021   Allergen Reaction Noted    Pantoprazole Hives and Anaphylaxis 07/10/2017    Diflucan [fluconazole]  06/22/2020    Erythromycin GI Intolerance 02/28/2017    Tetracycline Vomiting 01/24/2020          The following portions of the patient's history were reviewed and updated as appropriate: allergies, current medications, past family history, past medical history, past social history, past surgical history and problem list   Past Medical History:   Diagnosis Date    Bulging lumbar disc     L1-L2    Endometrial hyperplasia     endometrial polyp    Factor V deficiency ([facility] [address] ) 2005    Factor V Leiden ([facility] [address] )     RSD lower limb      Past Surgical History:   Procedure Laterality Date     SECTION      FOOT SURGERY      MOUTH SURGERY      LA EXC TUMOR SOFT TISS FACE&SCALP SUBFASCIAL < 2CM N/A 3/9/2017    Procedure: EXCISION FOREHEAD MASS;  Surgeon: Gian Powell MD;  Location:  MAIN OR;  Service: Plastics    TUBAL LIGATION       Family History   Problem Relation Age of Onset    Thyroid cancer Brother     Breast cancer Maternal Grandmother     Breast cancer Maternal Aunt 76        mothers twin sister    Breast cancer Other 39        maternal       Objective   Pulse (!) 124   Temp (!) 96 1 °F (35 6 °C)   Resp 20   Ht 5' 7" (1 702 m)   Wt 81 6 kg (180 lb)   LMP 2021   SpO2 100%   BMI 28 19 kg/m²   Patient's last menstrual period was 2021  Physical Exam     Physical Exam  Constitutional:       General: She is not in acute distress  Appearance: Normal appearance  She is normal weight  She is not ill-appearing, toxic-appearing or diaphoretic  HENT:      Head: Normocephalic and atraumatic  Right Ear: Tympanic membrane, ear canal and external ear normal       Left Ear: Tympanic membrane, ear canal and external ear normal       Nose: Nose normal  No congestion or rhinorrhea  Mouth/Throat:      Mouth: Mucous membranes are moist       Pharynx: No oropharyngeal exudate or posterior oropharyngeal erythema  Comments: Some cobblestoning posterior pharynx  Eyes:      General: No scleral icterus  Right eye: No discharge  Left eye: No discharge  Extraocular Movements: Extraocular movements intact  Conjunctiva/sclera: Conjunctivae normal       Pupils: Pupils are equal, round, and reactive to light  Neck:      Musculoskeletal: Normal range of motion and neck supple  No neck rigidity or muscular tenderness     Cardiovascular:      Rate and Rhythm: Normal rate and regular rhythm  Heart sounds: Normal heart sounds  No murmur  No friction rub  No gallop  Pulmonary:      Effort: Pulmonary effort is normal  No respiratory distress  Breath sounds: Normal breath sounds  No stridor  No wheezing, rhonchi or rales  Lymphadenopathy:      Cervical: No cervical adenopathy  Skin:     General: Skin is warm and dry  Neurological:      Mental Status: She is alert and oriented to person, place, and time     Psychiatric:         Mood and Affect: Mood normal          Behavior: Behavior normal

## 2021-02-20 NOTE — PATIENT INSTRUCTIONS
COVID testing initiated  Results take approximately 2-4 days to return  Results automatically get posted to your my chart account  If provider sees that you have not access your account and obtained a results, will call you on Tuesday with your results  In the meantime recommend nasal saline spray each nostril every 1-2 hours while awake to help moisturize and shrink any sinus passages and help thin any mucus that may be aggravating back of throat and eustachian tubes  May also do Flonase once a day  May continue your Claritin  Provider likes the Mucinex D 12 hour decongestant expectorant for daytime use only  Recommend 1/2 tablet in the morning to help decrease  Sinus pressure, ear pressure  Stay well hydrated  Follow-up with family doctor if not improving over the next 7-10 days

## 2021-03-12 ENCOUNTER — IMMUNIZATIONS (OUTPATIENT)
Dept: FAMILY MEDICINE CLINIC | Facility: HOSPITAL | Age: 51
End: 2021-03-12

## 2021-03-12 DIAGNOSIS — Z23 ENCOUNTER FOR IMMUNIZATION: Primary | ICD-10-CM

## 2021-03-12 PROCEDURE — 91301 SARS-COV-2 / COVID-19 MRNA VACCINE (MODERNA) 100 MCG: CPT

## 2021-03-12 PROCEDURE — 0011A SARS-COV-2 / COVID-19 MRNA VACCINE (MODERNA) 100 MCG: CPT

## 2021-03-19 LAB — MISCELLANEOUS LAB TEST RESULT: NORMAL

## 2021-05-17 ENCOUNTER — IMMUNIZATIONS (OUTPATIENT)
Dept: FAMILY MEDICINE CLINIC | Facility: HOSPITAL | Age: 51
End: 2021-05-17

## 2021-05-17 DIAGNOSIS — Z23 ENCOUNTER FOR IMMUNIZATION: Primary | ICD-10-CM

## 2021-05-17 PROCEDURE — 91301 SARS-COV-2 / COVID-19 MRNA VACCINE (MODERNA) 100 MCG: CPT

## 2021-05-17 PROCEDURE — 0012A SARS-COV-2 / COVID-19 MRNA VACCINE (MODERNA) 100 MCG: CPT

## 2021-12-10 ENCOUNTER — ANNUAL EXAM (OUTPATIENT)
Dept: OBGYN CLINIC | Facility: CLINIC | Age: 51
End: 2021-12-10
Payer: COMMERCIAL

## 2021-12-10 VITALS
HEIGHT: 68 IN | DIASTOLIC BLOOD PRESSURE: 90 MMHG | SYSTOLIC BLOOD PRESSURE: 148 MMHG | BODY MASS INDEX: 31.83 KG/M2 | WEIGHT: 210 LBS

## 2021-12-10 DIAGNOSIS — Z01.419 ENCNTR FOR GYN EXAM (GENERAL) (ROUTINE) W/O ABN FINDINGS: Primary | ICD-10-CM

## 2021-12-10 DIAGNOSIS — Z12.31 ENCOUNTER FOR SCREENING MAMMOGRAM FOR MALIGNANT NEOPLASM OF BREAST: ICD-10-CM

## 2021-12-10 DIAGNOSIS — Z01.419 PAP SMEAR, AS PART OF ROUTINE GYNECOLOGICAL EXAMINATION: ICD-10-CM

## 2021-12-10 PROCEDURE — 99396 PREV VISIT EST AGE 40-64: CPT | Performed by: OBSTETRICS & GYNECOLOGY

## 2021-12-10 RX ORDER — ACYCLOVIR 50 MG/G
CREAM TOPICAL
COMMUNITY
Start: 2021-11-19

## 2021-12-15 LAB
CLINICAL INFO: NORMAL
CYTO CVX: NORMAL
DATE PREVIOUS BX: NORMAL
HPV I/H RISK 1 DNA CVX QL PROBE+SIG AMP: NOT DETECTED
LMP START DATE: NORMAL
SL AMB PREV. PAP:: NORMAL
SPECIMEN SOURCE CVX/VAG CYTO: NORMAL

## 2021-12-20 ENCOUNTER — NURSE TRIAGE (OUTPATIENT)
Dept: OTHER | Facility: OTHER | Age: 51
End: 2021-12-20

## 2021-12-20 DIAGNOSIS — Z20.828 EXPOSURE TO SARS-ASSOCIATED CORONAVIRUS: Primary | ICD-10-CM

## 2021-12-20 PROCEDURE — 87636 SARSCOV2 & INF A&B AMP PRB: CPT | Performed by: FAMILY MEDICINE

## 2022-01-03 ENCOUNTER — HOSPITAL ENCOUNTER (EMERGENCY)
Facility: HOSPITAL | Age: 52
Discharge: LEFT BEFORE DISCHARGE | End: 2022-01-04
Payer: COMMERCIAL

## 2022-01-03 VITALS
SYSTOLIC BLOOD PRESSURE: 178 MMHG | OXYGEN SATURATION: 100 % | TEMPERATURE: 98.3 F | RESPIRATION RATE: 18 BRPM | HEART RATE: 100 BPM | WEIGHT: 206.79 LBS | DIASTOLIC BLOOD PRESSURE: 104 MMHG | BODY MASS INDEX: 31.91 KG/M2

## 2022-01-03 LAB
ATRIAL RATE: 82 BPM
P AXIS: 38 DEGREES
PR INTERVAL: 152 MS
QRS AXIS: 39 DEGREES
QRSD INTERVAL: 92 MS
QT INTERVAL: 380 MS
QTC INTERVAL: 443 MS
T WAVE AXIS: 60 DEGREES
VENTRICULAR RATE: 82 BPM

## 2022-01-03 PROCEDURE — 93005 ELECTROCARDIOGRAM TRACING: CPT

## 2022-01-03 PROCEDURE — 93010 ELECTROCARDIOGRAM REPORT: CPT | Performed by: INTERNAL MEDICINE

## 2022-01-28 ENCOUNTER — HOSPITAL ENCOUNTER (OUTPATIENT)
Dept: MAMMOGRAPHY | Facility: MEDICAL CENTER | Age: 52
Discharge: HOME | End: 2022-01-28
Payer: COMMERCIAL

## 2022-01-28 VITALS — BODY MASS INDEX: 31.34 KG/M2 | HEIGHT: 68 IN | WEIGHT: 206.79 LBS

## 2022-01-28 DIAGNOSIS — Z12.31 ENCOUNTER FOR SCREENING MAMMOGRAM FOR MALIGNANT NEOPLASM OF BREAST: ICD-10-CM

## 2022-01-28 PROCEDURE — 77067 SCR MAMMO BI INCL CAD: CPT

## 2022-01-28 PROCEDURE — 77063 BREAST TOMOSYNTHESIS BI: CPT

## 2022-03-15 ENCOUNTER — HOSPITAL ENCOUNTER (EMERGENCY)
Facility: HOSPITAL | Age: 52
Discharge: HOME | End: 2022-03-15
Attending: EMERGENCY MEDICINE
Payer: COMMERCIAL

## 2022-03-15 VITALS
TEMPERATURE: 98.9 F | OXYGEN SATURATION: 100 % | WEIGHT: 210.54 LBS | BODY MASS INDEX: 32.49 KG/M2 | RESPIRATION RATE: 16 BRPM | HEART RATE: 94 BPM | SYSTOLIC BLOOD PRESSURE: 174 MMHG | DIASTOLIC BLOOD PRESSURE: 84 MMHG

## 2022-03-15 DIAGNOSIS — I10 HYPERTENSION, UNSPECIFIED TYPE: Primary | ICD-10-CM

## 2022-03-15 PROCEDURE — 99284 EMERGENCY DEPT VISIT MOD MDM: CPT | Performed by: PHYSICIAN ASSISTANT

## 2022-03-15 PROCEDURE — 99283 EMERGENCY DEPT VISIT LOW MDM: CPT

## 2022-03-15 RX ORDER — METOPROLOL SUCCINATE 25 MG/1
25 TABLET, EXTENDED RELEASE ORAL DAILY
COMMUNITY

## 2022-03-16 NOTE — DISCHARGE INSTRUCTIONS
Please return if symptoms develop, if BP remains elevated  Follow up with family medicine doctor as needed  Continue taking BP medications at home  Try to avoid decongestants with your sinus infection as this can increase BP  Psueodphedrine, phenylephrine, afrin can all increase BP

## 2022-03-16 NOTE — ED PROVIDER NOTES
History  Chief Complaint   Patient presents with    Hypertension     Patient reports she has been experiencing high blood pressure, was seen by her pcp yesterday  Patient states she was prescribed Metoprolol  States she took half a dose tonight  States her BP at home was 204/107     53 y/o F PMHx anxiety, Factor V Leiden, HTN p/w high blood pressure  Patient was started on metoprolol 2 days ago, has been taking half since PCP told her it may make her dizzy  Patient took BP tonight, originally noted to be 160/84  Patient continued taking BP, states she was anxious at the time and still is now, BP elevated to 204/107  Patient currently denies CP, SOB, LH, HA, blurry/double vision, decreased urination, N/V, abdominal pain, syncope  Patient states she is starting with URI, family has had it  History provided by:  Patient   used: No    Hypertension  Severity:  Moderate  Onset quality:  Gradual  Timing:  Intermittent  Chronicity:  Recurrent  Notable PTA blood pressures:  204/107  Relieved by:  None tried  Associated symptoms: no abdominal pain, no chest pain, no dizziness, no fever, no headaches, no hematuria, no nausea, no neck pain, no palpitations, no shortness of breath, not vomiting and no weakness        Prior to Admission Medications   Prescriptions Last Dose Informant Patient Reported? Taking?    ALPRAZolam (XANAX) 0 25 mg tablet Not Taking at Unknown time  No No   Sig: Take 1 tablet (0 25 mg total) by mouth daily as needed for anxiety   Patient not taking: Reported on 12/10/2021    Ascorbic Acid 100 MG CHEW   Yes Yes   Sig: Chew 1,500 mg daily   Cholecalciferol (VITAMIN D-3 PO)   Yes Yes   Sig: Take by mouth   Coenzyme Q10 (COQ-10 PO) Not Taking at Unknown time  Yes No   Sig: Take by mouth   Patient not taking: Reported on 3/15/2022    acyclovir (ZOVIRAX) 5 % cream   Yes Yes   clindamycin (CLEOCIN T) 1 % lotion   Yes Yes   Sig: APPLY 1 TO 2 TIMES A DAY TO AFFECTED AREAS ON BACK/FACE famotidine (PEPCID) 40 MG tablet   No Yes   Sig: TAKE 1 TABLET BY MOUTH TWICE A DAY   losartan (COZAAR) 25 mg tablet   No Yes   Sig: Take 1 tablet (25 mg total) by mouth daily   metoprolol succinate (TOPROL-XL) 25 mg 24 hr tablet   Yes Yes   Sig: Take 25 mg by mouth daily   sertraline (ZOLOFT) 50 mg tablet   Yes Yes      Facility-Administered Medications: None       Past Medical History:   Diagnosis Date    Bulging lumbar disc     L1-L2    Endometrial hyperplasia     endometrial polyp    Factor V deficiency ([facility] [address] ) 2005    Factor V Leiden ([facility] [address] )     RSD lower limb        Past Surgical History:   Procedure Laterality Date     SECTION      FOOT SURGERY      MOUTH SURGERY      IN EXC TUMOR SOFT TISS FACE&SCALP SUBFASCIAL < 2CM N/A 3/9/2017    Procedure: EXCISION FOREHEAD MASS;  Surgeon: Wellington López MD;  Location:  MAIN OR;  Service: Plastics    TUBAL LIGATION         Family History   Problem Relation Age of Onset    Thyroid cancer Brother     Breast cancer Maternal Grandmother     Breast cancer Maternal Aunt 76        mothers twin sister    Breast cancer Other 39        maternal     I have reviewed and agree with the history as documented  E-Cigarette/Vaping    E-Cigarette Use Never User      E-Cigarette/Vaping Substances     Social History     Tobacco Use    Smoking status: Light Tobacco Smoker     Years: 3 00     Last attempt to quit:      Years since quittin 2    Smokeless tobacco: Never Used    Tobacco comment: "weekends"   Vaping Use    Vaping Use: Never used   Substance Use Topics    Alcohol use: Not Currently     Comment: rarely    Drug use: No       Review of Systems   Constitutional: Negative for activity change, appetite change and fever  HENT: Positive for congestion  Negative for rhinorrhea and sore throat  Eyes: Negative for photophobia and visual disturbance  Respiratory: Negative for cough, chest tightness and shortness of breath  Cardiovascular: Negative for chest pain, palpitations and leg swelling  Gastrointestinal: Negative for abdominal pain, diarrhea, nausea and vomiting  Genitourinary: Negative for decreased urine volume, dysuria, frequency and hematuria  Musculoskeletal: Negative for back pain, neck pain and neck stiffness  Skin: Negative for color change and rash  Neurological: Negative for dizziness, syncope, weakness, light-headedness, numbness and headaches  All other systems reviewed and are negative  Physical Exam  Physical Exam  Vitals reviewed  Constitutional:       General: She is not in acute distress  Appearance: Normal appearance  She is well-developed and well-groomed  She is not ill-appearing, toxic-appearing or diaphoretic  HENT:      Head: Normocephalic and atraumatic  Right Ear: External ear normal       Left Ear: External ear normal       Nose: Nose normal  No congestion or rhinorrhea  Mouth/Throat:      Mouth: Mucous membranes are moist       Pharynx: Oropharynx is clear  No oropharyngeal exudate or posterior oropharyngeal erythema  Eyes:      General: No scleral icterus  Right eye: No discharge  Left eye: No discharge  Extraocular Movements: Extraocular movements intact  Conjunctiva/sclera: Conjunctivae normal    Cardiovascular:      Rate and Rhythm: Normal rate and regular rhythm  Pulses: Normal pulses  Heart sounds: No murmur heard  No friction rub  No gallop  Pulmonary:      Effort: Pulmonary effort is normal  No respiratory distress  Breath sounds: Normal breath sounds  No wheezing, rhonchi or rales  Abdominal:      General: Abdomen is flat  There is no distension  Palpations: Abdomen is soft  Tenderness: There is no abdominal tenderness  There is no right CVA tenderness, left CVA tenderness, guarding or rebound  Musculoskeletal:         General: No deformity  Normal range of motion        Cervical back: Normal range of motion and neck supple  Skin:     General: Skin is warm and dry  Coloration: Skin is not jaundiced or pale  Findings: No rash  Neurological:      General: No focal deficit present  Mental Status: She is alert  Psychiatric:         Mood and Affect: Mood is anxious  Behavior: Behavior normal  Behavior is cooperative  Vital Signs  ED Triage Vitals [03/15/22 2127]   Temperature Pulse Respirations Blood Pressure SpO2   98 9 °F (37 2 °C) 94 16 (!) 185/88 100 %      Temp Source Heart Rate Source Patient Position - Orthostatic VS BP Location FiO2 (%)   Oral Monitor Sitting -- --      Pain Score       2           Vitals:    03/15/22 2127 03/15/22 2230   BP: (!) 185/88 (!) 174/84   Pulse: 94    Patient Position - Orthostatic VS: Sitting          Visual Acuity      ED Medications  Medications - No data to display    Diagnostic Studies  Results Reviewed     None                 No orders to display              Procedures  Procedures         ED Course  ED Course as of 03/16/22 0423   Tue Mar 15, 2022   2208 Patient checked BP at home 160/84, continued to check BP and noticed it increased to 204/107  Patient asymptomatic here  Patient endorses anxiety, has history of anxiety  Recently started metoprolol by PCP for BP and tachycardia  Patient only taking 1/2 currently  2229 Blood Pressure(!): 185/88  174/80 taken in room         MDM  Number of Diagnoses or Management Options  Hypertension, unspecified type: established and improving  Diagnosis management comments: 55 y/o F p/w HTN  204/107 at home after multiple BP checks, initial was 164/88  Patient is asymptomatic  Patient anxious on exam   Patient asymptomatic here, exam is benign  Patient is admitting to being anxious  Patient states she thought initial BP was high and she started to worry so she checked multiple times  Patient recently started Metoprolol for BP  Patient feels well otherwise  Dispo:d/c home with f/u to PCP   Patient BP improved here to 174/84  Continue taking medications as prescribed  Avoid decongestants if you are getting URI, these may raise BP  Patient comfortable with d/c  Patient stable at time of d/c     Prior to discharge, plan of care was discussed in detail with the patient at bedside  Patient was provided both verbal and written instructions  The patient (and any family present) verbalized understanding of the discharge instructions and warnings that would necessitate return to the ED  All questions were answered  Patient was comfortable with the plan of care and discharged to home  Patient stable at discharge  Risk of Complications, Morbidity, and/or Mortality  Presenting problems: moderate  Diagnostic procedures: moderate  Management options: moderate    Patient Progress  Patient progress: improved      Disposition  Final diagnoses:   Hypertension, unspecified type     Time reflects when diagnosis was documented in both MDM as applicable and the Disposition within this note     Time User Action Codes Description Comment    3/15/2022 10:11 PM Leonela Bacon Hypertension, unspecified type       ED Disposition     ED Disposition Condition Date/Time Comment    Discharge Stable Tue Mar 15, 2022 10:11 PM Isidro Lott discharge to home/self care              Follow-up Information     Follow up With Specialties Details Why Tamara Jones MD Internal Medicine Schedule an appointment as soon as possible for a visit  As needed [address]            Discharge Medication List as of 3/15/2022 10:32 PM      CONTINUE these medications which have NOT CHANGED    Details   acyclovir (ZOVIRAX) 5 % cream Starting Fri 11/19/2021, Historical Med      Ascorbic Acid 100 MG CHEW Chew 1,500 mg daily, Historical Med      Cholecalciferol (VITAMIN D-3 PO) Take by mouth, Until Discontinued, Historical Med      clindamycin (CLEOCIN T) 1 % lotion APPLY 1 TO 2 TIMES A DAY TO AFFECTED AREAS ON BACK/FACE, Historical Med      famotidine (PEPCID) 40 MG tablet TAKE 1 TABLET BY MOUTH TWICE A DAY, Normal      losartan (COZAAR) 25 mg tablet Take 1 tablet (25 mg total) by mouth daily, Starting Tue 7/14/2020, Normal      metoprolol succinate (TOPROL-XL) 25 mg 24 hr tablet Take 25 mg by mouth daily, Historical Med      sertraline (ZOLOFT) 50 mg tablet Starting Fri 10/29/2021, Historical Med      ALPRAZolam (XANAX) 0 25 mg tablet Take 1 tablet (0 25 mg total) by mouth daily as needed for anxiety, Starting Mon 7/13/2020, Normal      Coenzyme Q10 (COQ-10 PO) Take by mouth, Until Discontinued, Historical Med             No discharge procedures on file      PDMP Review     None          ED Provider  Electronically Signed by EMILI Cherry PA-C  03/16/22 6944

## 2022-04-09 ENCOUNTER — OFFICE VISIT (OUTPATIENT)
Dept: URGENT CARE | Facility: MEDICAL CENTER | Age: 52
End: 2022-04-09
Payer: COMMERCIAL

## 2022-04-09 ENCOUNTER — APPOINTMENT (OUTPATIENT)
Dept: RADIOLOGY | Facility: MEDICAL CENTER | Age: 52
End: 2022-04-09
Payer: COMMERCIAL

## 2022-04-09 VITALS
OXYGEN SATURATION: 94 % | HEIGHT: 68 IN | TEMPERATURE: 98.7 F | RESPIRATION RATE: 20 BRPM | WEIGHT: 190 LBS | BODY MASS INDEX: 28.79 KG/M2 | HEART RATE: 85 BPM | DIASTOLIC BLOOD PRESSURE: 79 MMHG | SYSTOLIC BLOOD PRESSURE: 165 MMHG

## 2022-04-09 DIAGNOSIS — S69.91XA RIGHT WRIST INJURY, INITIAL ENCOUNTER: ICD-10-CM

## 2022-04-09 DIAGNOSIS — S63.501A SPRAIN OF RIGHT WRIST, INITIAL ENCOUNTER: Primary | ICD-10-CM

## 2022-04-09 PROCEDURE — 73110 X-RAY EXAM OF WRIST: CPT

## 2022-04-09 PROCEDURE — 99213 OFFICE O/P EST LOW 20 MIN: CPT | Performed by: EMERGENCY MEDICINE

## 2022-04-09 NOTE — PATIENT INSTRUCTIONS
Wrist Sprain   WHAT YOU NEED TO KNOW:   A wrist sprain happens when one or more ligaments in your wrist stretch or tear  Ligaments are tough tissues that connect bones and keep them in place, and support your joints  DISCHARGE INSTRUCTIONS:   Return to the emergency department if:   · You have severe pain or swelling  · Your injured wrist is red or has red streaks spreading from the injured area  · You have new trouble moving your hands, fingers, or wrist     · Your wrist, hand, or fingers feel cold or numb  · Your fingernails turn blue or gray  Call your doctor if:   · Your symptoms get worse  · You have pain and swelling for more than 48 hours  · You have questions or concerns about your condition or care  Medicines: You may need any of the following:  · NSAIDs , such as ibuprofen, help decrease swelling, pain, and fever  NSAIDs can cause stomach bleeding or kidney problems in certain people  If you take blood thinner medicine, always ask your healthcare provider if NSAIDs are safe for you  Always read the medicine label and follow directions  · Acetaminophen  decreases pain and fever  It is available without a doctor's order  Ask how much to take and how often to take it  Follow directions  Read the labels of all other medicines you are using to see if they also contain acetaminophen, or ask your doctor or pharmacist  Acetaminophen can cause liver damage if not taken correctly  Do not use more than 4 grams (4,000 milligrams) total of acetaminophen in one day  · Take your medicine as directed  Contact your healthcare provider if you think your medicine is not helping or if you have side effects  Tell him or her if you are allergic to any medicine  Keep a list of the medicines, vitamins, and herbs you take  Include the amounts, and when and why you take them  Bring the list or the pill bottles to follow-up visits   Carry your medicine list with you in case of an emergency  Self-care:   · Rest  your wrist for at least 48 hours  Avoid activities that cause pain  · Ice  your wrist for 15 to 20 minutes every hour or as directed  Use an ice pack, or put crushed ice in a plastic bag  Cover it with a towel before you put it on your wrist  Ice helps prevent tissue damage and decreases swelling and pain  · Compress  your wrist with an elastic bandage  This will help decrease swelling, support your wrist, and help it heal  Wear your wrist wrap as directed  The elastic bandage should be snug but not tight  · Elevate  your wrist above the level of your heart as often as you can  This will help decrease swelling and pain  Prop your wrist on pillows or blankets to keep it elevated comfortably  Wrist support: You may need to wear a splint or cast to support your wrist and prevent more damage  Wear your splint as directed  Ask for instructions on how to bathe while you are wearing a splint or cast   Physical therapy:  Your healthcare provider may recommend that you go to physical therapy  A physical therapist teaches you exercises to help improve movement and strength, and to decrease pain  Follow up with your doctor as directed:  Write down your questions so you remember to ask them during your visits  © Copyright [employer] 2022 Information is for End User's use only and may not be sold, redistributed or otherwise used for commercial purposes  All illustrations and images included in CareNotes® are the copyrighted property of [employer]  or Naomi Cardenas  The above information is an  only  It is not intended as medical advice for individual conditions or treatments  Talk to your doctor, nurse or pharmacist before following any medical regimen to see if it is safe and effective for you

## 2022-04-09 NOTE — PROGRESS NOTES
[facility]        NAME: Travis Wheeler is a 54 y o  female  : 1970    MRN: [id number]  DATE: 2022  TIME: 12:55 PM    Assessment and Plan   Sprain of right wrist, initial encounter [S63 501A]  1  Sprain of right wrist, initial encounter  XR wrist 3+ vw right    Splint application    Ambulatory Referral to Orthopedic Surgery         Patient Instructions     Wrist Sprain   WHAT YOU NEED TO KNOW:   A wrist sprain happens when one or more ligaments in your wrist stretch or tear  Ligaments are tough tissues that connect bones and keep them in place, and support your joints  DISCHARGE INSTRUCTIONS:   Return to the emergency department if:   · You have severe pain or swelling  · Your injured wrist is red or has red streaks spreading from the injured area  · You have new trouble moving your hands, fingers, or wrist     · Your wrist, hand, or fingers feel cold or numb  · Your fingernails turn blue or gray  Call your doctor if:   · Your symptoms get worse  · You have pain and swelling for more than 48 hours  · You have questions or concerns about your condition or care  Medicines: You may need any of the following:  · NSAIDs , such as ibuprofen, help decrease swelling, pain, and fever  NSAIDs can cause stomach bleeding or kidney problems in certain people  If you take blood thinner medicine, always ask your healthcare provider if NSAIDs are safe for you  Always read the medicine label and follow directions  · Acetaminophen  decreases pain and fever  It is available without a doctor's order  Ask how much to take and how often to take it  Follow directions  Read the labels of all other medicines you are using to see if they also contain acetaminophen, or ask your doctor or pharmacist  Acetaminophen can cause liver damage if not taken correctly  Do not use more than 4 grams (4,000 milligrams) total of acetaminophen in one day  · Take your medicine as directed    Contact your healthcare provider if you think your medicine is not helping or if you have side effects  Tell him or her if you are allergic to any medicine  Keep a list of the medicines, vitamins, and herbs you take  Include the amounts, and when and why you take them  Bring the list or the pill bottles to follow-up visits  Carry your medicine list with you in case of an emergency  Self-care:   · Rest  your wrist for at least 48 hours  Avoid activities that cause pain  · Ice  your wrist for 15 to 20 minutes every hour or as directed  Use an ice pack, or put crushed ice in a plastic bag  Cover it with a towel before you put it on your wrist  Ice helps prevent tissue damage and decreases swelling and pain  · Compress  your wrist with an elastic bandage  This will help decrease swelling, support your wrist, and help it heal  Wear your wrist wrap as directed  The elastic bandage should be snug but not tight  · Elevate  your wrist above the level of your heart as often as you can  This will help decrease swelling and pain  Prop your wrist on pillows or blankets to keep it elevated comfortably  Wrist support: You may need to wear a splint or cast to support your wrist and prevent more damage  Wear your splint as directed  Ask for instructions on how to bathe while you are wearing a splint or cast   Physical therapy:  Your healthcare provider may recommend that you go to physical therapy  A physical therapist teaches you exercises to help improve movement and strength, and to decrease pain  Follow up with your doctor as directed:  Write down your questions so you remember to ask them during your visits  © Copyright [employer] 2022 Information is for End User's use only and may not be sold, redistributed or otherwise used for commercial purposes   All illustrations and images included in CareNotes® are the copyrighted property of A D A M , Inc  or [facility] Jarad Rajput   The above information is an  only  It is not intended as medical advice for individual conditions or treatments  Talk to your doctor, nurse or pharmacist before following any medical regimen to see if it is safe and effective for you  Follow up with PCP in 3-5 days  Proceed to  ER if symptoms worsen  Chief Complaint     Chief Complaint   Patient presents with    Wrist Pain     Patient states she was arm wrestlinglast night and woke today with pain in her R lateral wrist         History of Present Illness       53 y/o female presents today for evaluation of right wrist pain  She states she was arm wrestling last night, did something to her wrist, states that she had significant pain when she woke up this morning  Denies other injury  Did not take anything for her symptoms prior to arrival   Pain is worse with movement  Wrist Pain   This is a new problem  The current episode started today  The problem occurs constantly  The problem has been unchanged  Pertinent negatives include no fever or joint swelling  Review of Systems   Review of Systems   Constitutional: Negative for chills, fatigue and fever  HENT: Negative for congestion, postnasal drip, sore throat and trouble swallowing  Eyes: Negative for visual disturbance  Respiratory: Negative for chest tightness and shortness of breath  Gastrointestinal: Negative for abdominal pain  Genitourinary: Negative for dysuria  Musculoskeletal: Positive for arthralgias  Negative for back pain  Skin: Negative for rash  Allergic/Immunologic: Negative for immunocompromised state  Neurological: Negative for dizziness, light-headedness and headaches  Psychiatric/Behavioral: Negative for confusion           Current Medications       Current Outpatient Medications:     acyclovir (ZOVIRAX) 5 % cream, , Disp: , Rfl:     Ascorbic Acid 100 MG CHEW, Chew 1,500 mg daily, Disp: , Rfl:     Cholecalciferol (VITAMIN D-3 PO), Take by mouth, Disp: , Rfl:    clindamycin (CLEOCIN T) 1 % lotion, APPLY 1 TO 2 TIMES A DAY TO AFFECTED AREAS ON BACK/FACE, Disp: , Rfl: 3    famotidine (PEPCID) 40 MG tablet, TAKE 1 TABLET BY MOUTH TWICE A DAY, Disp: 180 tablet, Rfl: 3    metoprolol succinate (TOPROL-XL) 25 mg 24 hr tablet, Take 25 mg by mouth daily, Disp: , Rfl:     sertraline (ZOLOFT) 50 mg tablet, , Disp: , Rfl:     ALPRAZolam (XANAX) 0 25 mg tablet, Take 1 tablet (0 25 mg total) by mouth daily as needed for anxiety (Patient not taking: Reported on 12/10/2021 ), Disp: 20 tablet, Rfl: 0    Coenzyme Q10 (COQ-10 PO), Take by mouth (Patient not taking: Reported on 3/15/2022 ), Disp: , Rfl:     losartan (COZAAR) 25 mg tablet, Take 1 tablet (25 mg total) by mouth daily (Patient not taking: Reported on 2022 ), Disp: 30 tablet, Rfl: 3    Current Allergies     Allergies as of 2022 - Reviewed 03/15/2022   Allergen Reaction Noted    Pantoprazole Hives and Anaphylaxis 07/10/2017    Diflucan [fluconazole]  2020    Erythromycin GI Intolerance 2017    Tetracycline Vomiting 2020    Cefpodoxime Rash 2021            The following portions of the patient's history were reviewed and updated as appropriate: allergies, current medications, past family history, past medical history, past social history, past surgical history and problem list      Past Medical History:   Diagnosis Date    Bulging lumbar disc     L1-L2    Endometrial hyperplasia     endometrial polyp    Factor V deficiency ([address] ) 2005    Factor V Leiden ([facility] [address]     RSD lower limb        Past Surgical History:   Procedure Laterality Date     SECTION      FOOT SURGERY      MOUTH SURGERY      CT EXC TUMOR SOFT TISS FACE&SCALP SUBFASCIAL < 2CM N/A 3/9/2017    Procedure: EXCISION FOREHEAD MASS;  Surgeon: Marely Robbins MD;  Location:  MAIN OR;  Service: Plastics    TUBAL LIGATION         Family History   Problem Relation Age of Onset    Thyroid cancer Brother     Breast cancer Maternal Grandmother     Breast cancer Maternal Aunt 76        mothers twin sister    Breast cancer Other 39        maternal         Medications have been verified  Objective   /79   Pulse 85   Temp 98 7 °F (37 1 °C)   Resp 20   Ht 5' 7 5" (1 715 m)   Wt 86 2 kg (190 lb)   LMP 04/04/2022   SpO2 94%   BMI 29 32 kg/m²        Physical Exam     Physical Exam  Vitals and nursing note reviewed  Constitutional:       Appearance: Normal appearance  She is not ill-appearing  Cardiovascular:      Rate and Rhythm: Normal rate and regular rhythm  Pulses: Normal pulses  Pulmonary:      Effort: Pulmonary effort is normal       Breath sounds: Normal breath sounds  Musculoskeletal:      Right wrist: Tenderness (Medially) present  No swelling, deformity, effusion or lacerations  Decreased range of motion  Left wrist: Normal       Comments: Pain is worse with supination  There is no swelling  There is no deformity  She is neurovascularly intact distally  Neurological:      Mental Status: She is alert

## 2022-10-17 ENCOUNTER — OFFICE VISIT (OUTPATIENT)
Dept: URGENT CARE | Facility: MEDICAL CENTER | Age: 52
End: 2022-10-17
Payer: COMMERCIAL

## 2022-10-17 VITALS
DIASTOLIC BLOOD PRESSURE: 80 MMHG | SYSTOLIC BLOOD PRESSURE: 136 MMHG | TEMPERATURE: 98.9 F | RESPIRATION RATE: 18 BRPM | OXYGEN SATURATION: 98 % | HEART RATE: 78 BPM

## 2022-10-17 DIAGNOSIS — M79.601 RIGHT ARM PAIN: Primary | ICD-10-CM

## 2022-10-17 DIAGNOSIS — D68.51 FACTOR V LEIDEN (HCC): ICD-10-CM

## 2022-10-17 PROBLEM — R22.1 NECK NODULE: Status: ACTIVE | Noted: 2017-11-17

## 2022-10-17 PROBLEM — R20.9 SKIN SENSATION DISTURBANCE: Status: ACTIVE | Noted: 2022-10-17

## 2022-10-17 PROBLEM — Z01.419 PAP SMEAR, AS PART OF ROUTINE GYNECOLOGICAL EXAMINATION: Status: ACTIVE | Noted: 2022-10-17

## 2022-10-17 PROBLEM — R22.9 LOCALIZED SUPERFICIAL SWELLING, MASS, OR LUMP: Status: ACTIVE | Noted: 2021-08-10

## 2022-10-17 PROBLEM — D72.823 LEUKEMOID REACTION: Status: ACTIVE | Noted: 2017-07-10

## 2022-10-17 PROBLEM — T78.40XA ALLERGIC REACTION CAUSED BY A DRUG: Status: ACTIVE | Noted: 2017-07-10

## 2022-10-17 PROBLEM — Z01.419 ENCOUNTER FOR ROUTINE GYNECOLOGICAL EXAMINATION: Status: ACTIVE | Noted: 2022-10-17

## 2022-10-17 PROCEDURE — 99213 OFFICE O/P EST LOW 20 MIN: CPT

## 2022-10-17 RX ORDER — AZITHROMYCIN 250 MG/1
TABLET, FILM COATED ORAL
COMMUNITY
Start: 2022-10-09

## 2022-10-17 RX ORDER — NITROFURANTOIN 25; 75 MG/1; MG/1
CAPSULE ORAL
COMMUNITY
Start: 2022-09-30

## 2022-10-17 RX ORDER — PHENAZOPYRIDINE HYDROCHLORIDE 200 MG/1
TABLET, FILM COATED ORAL
COMMUNITY
Start: 2022-09-30

## 2022-10-17 RX ORDER — TAZAROTENE 0.1 MG/G
CREAM CUTANEOUS
COMMUNITY
Start: 2022-08-12

## 2022-10-17 RX ORDER — DOXYCYCLINE HYCLATE 20 MG
20 TABLET ORAL 2 TIMES DAILY WITH MEALS
COMMUNITY
Start: 2022-09-30

## 2022-10-17 RX ORDER — CYCLOPENTOLATE HYDROCHLORIDE 10 MG/ML
SOLUTION/ DROPS OPHTHALMIC
COMMUNITY
Start: 2022-09-21

## 2022-10-17 RX ORDER — NEOMYCIN SULFATE, POLYMYXIN B SULFATE AND DEXAMETHASONE 3.5; 10000; 1 MG/ML; [USP'U]/ML; MG/ML
SUSPENSION/ DROPS OPHTHALMIC
COMMUNITY
Start: 2022-08-22

## 2022-10-17 RX ORDER — TRETINOIN 0.5 MG/G
CREAM TOPICAL
COMMUNITY
Start: 2022-08-05

## 2022-10-17 RX ORDER — PREDNISOLONE ACETATE 10 MG/ML
SUSPENSION/ DROPS OPHTHALMIC
COMMUNITY
Start: 2022-09-21

## 2022-10-17 NOTE — PROGRESS NOTES
[facility]        NAME: Kishore Tovar is a 54 y o  female  : 1970    MRN: [id number]  DATE: 2022  TIME: 2:19 PM      Assessment and Plan     Right arm pain [M79 601]  1  Right arm pain  Transfer to other facility   2  Factor V [facility])  Transfer to other facility     Patient agreeable to proceed to the ER for further evaluation given hx of symptoms  Requesting to go to [facility] ER    Patient Instructions     Proceed to the ER for further evaluation  Chief Complaint     Chief Complaint   Patient presents with   • Bleeding/Bruising     Patient c/o bruising with redness and swelling in her R arm  Patient reports that she was seen in the ER on 10/13/2022 and the bruising is where the inserted the IV  She noted history of factor 5  History of Present Illness     Patient is a 28-year-old female who presents with right arm pain  States she had an IV inserted in her arm 3 days ago  States yesterday she started with pain  States she noticed redness and ecchymosis to the area  States it hurts to touch  States that is not hot touch  States she has a history of factor 5  Denies current use of blood thinners  Denies recent blood clot  Denies numbness or tingling  States the IV did not infiltrate  States they gave her normal saline solution through the IV  Review of Systems     Review of Systems   Constitutional: Negative for chills and fever  Musculoskeletal:        Right upper arm pain   Skin: Positive for color change  Neurological: Negative for numbness  All other systems reviewed and are negative          Current Medications       Current Outpatient Medications:   •  acyclovir (ZOVIRAX) 5 % cream, , Disp: , Rfl:   •  ALPRAZolam (XANAX) 0 25 mg tablet, Take 1 tablet (0 25 mg total) by mouth daily as needed for anxiety (Patient not taking: Reported on 12/10/2021 ), Disp: 20 tablet, Rfl: 0  •  Ascorbic Acid 100 MG CHEW, Chew 1,500 mg daily, Disp: , Rfl:   • azithromycin (ZITHROMAX) 250 mg tablet, TAKE 2 TABLETS BY MOUTH TODAY, THEN TAKE 1 TABLET DAILY FOR 4 DAYS, Disp: , Rfl:   •  Cholecalciferol (VITAMIN D-3 PO), Take by mouth, Disp: , Rfl:   •  clindamycin (CLEOCIN T) 1 % lotion, APPLY 1 TO 2 TIMES A DAY TO AFFECTED AREAS ON BACK/FACE, Disp: , Rfl: 3  •  Coenzyme Q10 (COQ-10 PO), Take by mouth (Patient not taking: Reported on 3/15/2022 ), Disp: , Rfl:   •  cyclopentolate (CYCLOGYL) 1 % ophthalmic solution, PUT 1 DROP INTO RIGHT EYE 2X A DAY FOR 30 DAYS  CONTINUE THIS MEDICATION UNTIL YOUR NEXT VISIT, Disp: , Rfl:   •  doxycycline (PERIOSTAT) 20 MG tablet, Take 20 mg by mouth 2 (two) times a day with meals, Disp: , Rfl:   •  famotidine (PEPCID) 40 MG tablet, TAKE 1 TABLET BY MOUTH TWICE A DAY, Disp: 180 tablet, Rfl: 3  •  losartan (COZAAR) 25 mg tablet, Take 1 tablet (25 mg total) by mouth daily (Patient not taking: Reported on 4/9/2022 ), Disp: 30 tablet, Rfl: 3  •  metoprolol succinate (TOPROL-XL) 25 mg 24 hr tablet, Take 25 mg by mouth daily, Disp: , Rfl:   •  neomycin-polymyxin-dexamethasone (MAXITROL) ophthalmic suspension, PUT 1 DROP INTO RIGHT EYE 3 TIMES A DAY WITHOUT CONTACT, Disp: , Rfl:   •  nitrofurantoin (MACROBID) 100 mg capsule, TAKE 1 CAPSULE BY MOUTH TWICE A DAY FOR 7 DAYS, Disp: , Rfl:   •  phenazopyridine (PYRIDIUM) 200 mg tablet, TAKE 1 TABLET (200 MG TOTAL) BY MOUTH 3 (THREE) TIMES A DAY AS NEEDED (URINARY DISCOMFORT)  , Disp: , Rfl:   •  prednisoLONE acetate (PRED FORTE) 1 % ophthalmic suspension, PUT 1 DROP INTO RIGHT EYE 4 TIMES A DAY FOR 7 DAYS CONTINUE THIS MEDICATION UNTIL NEXT VISIT, Disp: , Rfl:   •  sertraline (ZOLOFT) 50 mg tablet, , Disp: , Rfl:   •  Tazorac 0 1 % cream, APPLY TOPICALLY TO AFFECTED AREAS AS DIRECTED BRAND NECESSARY, Disp: , Rfl:   •  tretinoin (REFISSA) 0 05 % cream, APPLY TOPICALLY TO AFFECTED AREAS AT NIGHT AS DIRECTED, Disp: , Rfl:     Current Allergies     Allergies as of 10/17/2022 - Reviewed 10/17/2022   Allergen Reaction Noted   • Pantoprazole Hives and Anaphylaxis 07/10/2017   • Diflucan [fluconazole]  2020   • Erythromycin GI Intolerance 2017   • Tetracycline Vomiting 2020   • Cefpodoxime Rash 2021              The following portions of the patient's history were reviewed and updated as appropriate: allergies, current medications, past family history, past medical history, past social history, past surgical history and problem list      Past Medical History:   Diagnosis Date   • Bulging lumbar disc     L1-L2   • Endometrial hyperplasia     endometrial polyp   • Factor V deficiency ([facility] [address] ) 2005   • Factor V Leiden ([facility] [address] )    • RSD lower limb        Past Surgical History:   Procedure Laterality Date   •  SECTION     • FOOT SURGERY     • MOUTH SURGERY     • MN EXC TUMOR SOFT TISS FACE&SCALP SUBFASCIAL < 2CM N/A 3/9/2017    Procedure: EXCISION FOREHEAD MASS;  Surgeon: Todd Murray MD;  Location:  MAIN OR;  Service: Plastics   • TUBAL LIGATION         Family History   Problem Relation Age of Onset   • Thyroid cancer Brother    • Breast cancer Maternal Grandmother    • Breast cancer Maternal Aunt 76        mothers twin sister   • Breast cancer Other 39        maternal         Medications have been verified  Objective     /80   Pulse 78   Temp 98 9 °F (37 2 °C)   Resp 18   SpO2 98%   No LMP recorded  Physical Exam     Physical Exam  Vitals and nursing note reviewed  Constitutional:       General: She is awake  She is not in acute distress  Appearance: Normal appearance  She is not ill-appearing, toxic-appearing or diaphoretic  Cardiovascular:      Rate and Rhythm: Normal rate  Pulses: Normal pulses  Heart sounds: Normal heart sounds, S1 normal and S2 normal    Pulmonary:      Effort: Pulmonary effort is normal       Breath sounds: Normal breath sounds and air entry  Musculoskeletal:      Right hand:  There is no disruption of two-point discrimination  Normal capillary refill  Normal pulse  Arms:    Skin:     General: Skin is warm  Capillary Refill: Capillary refill takes less than 2 seconds  Neurological:      Mental Status: She is alert  Psychiatric:         Mood and Affect: Mood normal          Behavior: Behavior normal          Thought Content:  Thought content normal          Judgment: Judgment normal

## 2022-11-20 ENCOUNTER — APPOINTMENT (OUTPATIENT)
Dept: RADIOLOGY | Facility: MEDICAL CENTER | Age: 52
End: 2022-11-20

## 2022-11-20 ENCOUNTER — OFFICE VISIT (OUTPATIENT)
Dept: URGENT CARE | Facility: MEDICAL CENTER | Age: 52
End: 2022-11-20

## 2022-11-20 VITALS
DIASTOLIC BLOOD PRESSURE: 98 MMHG | HEART RATE: 112 BPM | RESPIRATION RATE: 20 BRPM | OXYGEN SATURATION: 99 % | HEIGHT: 67 IN | SYSTOLIC BLOOD PRESSURE: 176 MMHG | BODY MASS INDEX: 30.61 KG/M2 | TEMPERATURE: 100.9 F | WEIGHT: 195 LBS

## 2022-11-20 DIAGNOSIS — R05.1 ACUTE COUGH: ICD-10-CM

## 2022-11-20 DIAGNOSIS — B34.9 VIRAL SYNDROME: ICD-10-CM

## 2022-11-20 DIAGNOSIS — R05.1 ACUTE COUGH: Primary | ICD-10-CM

## 2022-11-20 RX ORDER — ALBUTEROL SULFATE 90 UG/1
2 AEROSOL, METERED RESPIRATORY (INHALATION) EVERY 4 HOURS PRN
Qty: 1 G | Refills: 0 | Status: SHIPPED | OUTPATIENT
Start: 2022-11-20

## 2022-11-20 RX ORDER — FLUTICASONE PROPIONATE 50 MCG
1 SPRAY, SUSPENSION (ML) NASAL DAILY
Qty: 9.9 ML | Refills: 0 | Status: SHIPPED | OUTPATIENT
Start: 2022-11-20

## 2022-11-20 RX ORDER — CYCLOSPORINE 0.5 MG/ML
1 EMULSION OPHTHALMIC EVERY 12 HOURS
COMMUNITY
Start: 2022-11-18

## 2022-11-21 NOTE — PATIENT INSTRUCTIONS
Your evaluation suggests that your symptoms are most likely due to a viral illness, which will improve on its own with rest and fluids  Your doctor has prescribed medications which may help with some of your symptoms  We recommend you take 600mg ibuprofen every 6 hours or tylenol 650mg every 6 hours as needed for fever  If needed, you can alternate these medications so that you take one medication every 3 hours  For instance, at noon take ibuprofen, then at 3pm take tylenol, then at 6pm take ibuprofen  Over the counter allergy medication like Claritin, Allegra or Zyrtec can help with nasal congestion and post nasal drip  Over the counter steroid nasal sprays like Flonase can help with nasal congestion and post nasal drip as well  Delsym, an over the counter cough medication may be used every 12 hours as needed  Mucinex XR (guafenisen) 600 mg tablets may be used to thin out the mucous to make it easier to cough up  You may take 1-2 tablets twice per day as needed  Salt water gargles with 1 teaspoon of salt dissolved in 6-8 oz of water as needed can help with a sore throat  Please schedule an appointment for follow up with your primary care physician this week  Return to the Emergency Department if you experience worsening cough, fever 100 4 ° F or greater  that is not controlled by Tylenol or Ibuprofen, recurrent vomiting, chest pain, shortness of breath, or any other concerning symptoms  Acute Cough   WHAT YOU NEED TO KNOW:   An acute cough can last up to 3 weeks  Common causes of an acute cough include a cold, allergies, or a lung infection  DISCHARGE INSTRUCTIONS:   Return to the emergency department if:   You have trouble breathing or feel short of breath  You cough up blood, or you see blood in your mucus  You faint or feel weak or dizzy  You have chest pain when you cough or take a deep breath  You have new wheezing      Contact your healthcare provider if:   You have a fever     Your cough lasts longer than 4 weeks  Your symptoms do not improve with treatment  You have questions or concerns about your condition or care  Medicines:   Medicines  may be needed to stop the cough, decrease swelling in your airways, or help open your airways  Medicine may also be given to help you cough up mucus  Ask your healthcare provider what over-the-counter medicines you can take  If you have an infection caused by bacteria, you may need antibiotics  Take your medicine as directed  Contact your healthcare provider if you think your medicine is not helping or if you have side effects  Tell him or her if you are allergic to any medicine  Keep a list of the medicines, vitamins, and herbs you take  Include the amounts, and when and why you take them  Bring the list or the pill bottles to follow-up visits  Carry your medicine list with you in case of an emergency  Manage your symptoms:   Do not smoke and stay away from others who smoke  Nicotine and other chemicals in cigarettes and cigars can cause lung damage and make your cough worse  Ask your healthcare provider for information if you currently smoke and need help to quit  E-cigarettes or smokeless tobacco still contain nicotine  Talk to your healthcare provider before you use these products  Drink extra liquids as directed  Liquids will help thin and loosen mucus so you can cough it up  Liquids will also help prevent dehydration  Examples of good liquids to drink include water, fruit juice, and broth  Do not drink liquids that contain caffeine  Caffeine can increase your risk for dehydration  Ask your healthcare provider how much liquid to drink each day  Rest as directed  Do not do activities that make your cough worse, such as exercise  Use a humidifier or vaporizer  Use a cool mist humidifier or a vaporizer to increase air moisture in your home   This may make it easier for you to breathe and help decrease your cough  Eat 2 to 5 mL of honey 2 times each day  Honey can help thin mucus and decrease your cough  Use cough drops or lozenges  These can help decrease throat irritation and your cough  Follow up with your healthcare provider as directed:  Write down your questions so you remember to ask them during your visits  © Copyright [employer] 2022 Information is for End User's use only and may not be sold, redistributed or otherwise used for commercial purposes  All illustrations and images included in CareNotes® are the copyrighted property of [employer]  or [facility] Jarad Rajput   The above information is an  only  It is not intended as medical advice for individual conditions or treatments  Talk to your doctor, nurse or pharmacist before following any medical regimen to see if it is safe and effective for you

## 2022-11-21 NOTE — PROGRESS NOTES
[facility]        NAME: Tracy Alexis is a 50 y o  female  : 1970    MRN: [id number]  DATE: 2022  TIME: 7:57 PM    Assessment and Plan   Acute cough [R05 1]  1  Acute cough  XR chest pa & lateral    albuterol (PROVENTIL HFA,VENTOLIN HFA) 90 mcg/act inhaler    fluticasone (FLONASE) 50 mcg/act nasal spray      2  Viral syndrome          7:57 PM  Chest xray clear  Patient is non-toxic appearing  Will start albuterol and flonase  Patient advised to wear a mask while symptomatic  Wash hands frequently  F/u with PCP as outpatient  Reasons to present to the ED were reviewed  Patient Instructions   Your evaluation suggests that your symptoms are most likely due to a viral illness, which will improve on its own with rest and fluids  Your doctor has prescribed medications which may help with some of your symptoms  We recommend you take 600mg ibuprofen every 6 hours or tylenol 650mg every 6 hours as needed for fever  If needed, you can alternate these medications so that you take one medication every 3 hours  For instance, at noon take ibuprofen, then at 3pm take tylenol, then at 6pm take ibuprofen  Over the counter allergy medication like Claritin, Allegra or Zyrtec can help with nasal congestion and post nasal drip  Over the counter steroid nasal sprays like Flonase can help with nasal congestion and post nasal drip as well  Delsym, an over the counter cough medication may be used every 12 hours as needed  Mucinex XR (guafenisen) 600 mg tablets may be used to thin out the mucous to make it easier to cough up  You may take 1-2 tablets twice per day as needed  Salt water gargles with 1 teaspoon of salt dissolved in 6-8 oz of water as needed can help with a sore throat  Please schedule an appointment for follow up with your primary care physician this week      Return to the Emergency Department if you experience worsening cough, fever 100 4 ° F or greater that is not controlled by Tylenol or Ibuprofen, recurrent vomiting, chest pain, shortness of breath, or any other concerning symptoms  Acute Cough   WHAT YOU NEED TO KNOW:   An acute cough can last up to 3 weeks  Common causes of an acute cough include a cold, allergies, or a lung infection  DISCHARGE INSTRUCTIONS:   Return to the emergency department if:   · You have trouble breathing or feel short of breath  · You cough up blood, or you see blood in your mucus  · You faint or feel weak or dizzy  · You have chest pain when you cough or take a deep breath  · You have new wheezing  Contact your healthcare provider if:   · You have a fever  · Your cough lasts longer than 4 weeks  · Your symptoms do not improve with treatment  · You have questions or concerns about your condition or care  Medicines:   · Medicines  may be needed to stop the cough, decrease swelling in your airways, or help open your airways  Medicine may also be given to help you cough up mucus  Ask your healthcare provider what over-the-counter medicines you can take  If you have an infection caused by bacteria, you may need antibiotics  · Take your medicine as directed  Contact your healthcare provider if you think your medicine is not helping or if you have side effects  Tell him or her if you are allergic to any medicine  Keep a list of the medicines, vitamins, and herbs you take  Include the amounts, and when and why you take them  Bring the list or the pill bottles to follow-up visits  Carry your medicine list with you in case of an emergency  Manage your symptoms:   · Do not smoke and stay away from others who smoke  Nicotine and other chemicals in cigarettes and cigars can cause lung damage and make your cough worse  Ask your healthcare provider for information if you currently smoke and need help to quit  E-cigarettes or smokeless tobacco still contain nicotine   Talk to your healthcare provider before you use these products  · Drink extra liquids as directed  Liquids will help thin and loosen mucus so you can cough it up  Liquids will also help prevent dehydration  Examples of good liquids to drink include water, fruit juice, and broth  Do not drink liquids that contain caffeine  Caffeine can increase your risk for dehydration  Ask your healthcare provider how much liquid to drink each day  · Rest as directed  Do not do activities that make your cough worse, such as exercise  · Use a humidifier or vaporizer  Use a cool mist humidifier or a vaporizer to increase air moisture in your home  This may make it easier for you to breathe and help decrease your cough  · Eat 2 to 5 mL of honey 2 times each day  Honey can help thin mucus and decrease your cough  · Use cough drops or lozenges  These can help decrease throat irritation and your cough  Follow up with your healthcare provider as directed:  Write down your questions so you remember to ask them during your visits  © [employer] 2022 Information is for End User's use only and may not be sold, redistributed or otherwise used for commercial purposes  All illustrations and images included in CareNotes® are the copyrighted property of A [employer] , [employer]  or [address] [place] [address]  The above information is an  only  It is not intended as medical advice for individual conditions or treatments  Talk to your doctor, nurse or pharmacist before following any medical regimen to see if it is safe and effective for you  Follow up with PCP in 3-5 days  Proceed to  ER if symptoms worsen  Chief Complaint     Chief Complaint   Patient presents with   • Cough     Video visit today and was told she had a virus  Wednesday night she started with tickle her cough has gotten worse, fever, and sinus congestion  And headache   SHe has had multiple negative covid tests         History of Present Illness       27-year-old female presents today for evaluation of a cough and a fever  She states the cough started 5 days ago  She had a video visit at [facility] today and was told it was a virus  Tonight she started with a fever  Temperature here is 100 9  She states she came in to be evaluated because she had “triple pneumonia”      Review of Systems   Review of Systems   Constitutional: Positive for fever  Negative for chills and fatigue  HENT: Positive for congestion and postnasal drip  Negative for sore throat and trouble swallowing  Respiratory: Positive for cough  Negative for chest tightness and shortness of breath  Gastrointestinal: Negative for abdominal pain  Genitourinary: Negative for dysuria  Skin: Negative for rash  Allergic/Immunologic: Negative for immunocompromised state  Neurological: Negative for dizziness, light-headedness and headaches           Current Medications       Current Outpatient Medications:   •  albuterol (PROVENTIL HFA,VENTOLIN HFA) 90 mcg/act inhaler, Inhale 2 puffs every 4 (four) hours as needed for wheezing, Disp: 1 g, Rfl: 0  •  fluticasone (FLONASE) 50 mcg/act nasal spray, 1 spray into each nostril daily, Disp: 9 9 mL, Rfl: 0  •  acyclovir (ZOVIRAX) 5 % cream, , Disp: , Rfl:   •  ALPRAZolam (XANAX) 0 25 mg tablet, Take 1 tablet (0 25 mg total) by mouth daily as needed for anxiety (Patient not taking: Reported on 12/10/2021 ), Disp: 20 tablet, Rfl: 0  •  Ascorbic Acid 100 MG CHEW, Chew 1,500 mg daily, Disp: , Rfl:   •  azithromycin (ZITHROMAX) 250 mg tablet, TAKE 2 TABLETS BY MOUTH TODAY, THEN TAKE 1 TABLET DAILY FOR 4 DAYS, Disp: , Rfl:   •  Cholecalciferol (VITAMIN D-3 PO), Take by mouth, Disp: , Rfl:   •  clindamycin (CLEOCIN T) 1 % lotion, APPLY 1 TO 2 TIMES A DAY TO AFFECTED AREAS ON BACK/FACE, Disp: , Rfl: 3  •  Coenzyme Q10 (COQ-10 PO), Take by mouth (Patient not taking: Reported on 3/15/2022 ), Disp: , Rfl:   •  cyclopentolate (CYCLOGYL) 1 % ophthalmic solution, PUT 1 DROP INTO RIGHT EYE 2X A DAY FOR 30 DAYS  CONTINUE THIS MEDICATION UNTIL YOUR NEXT VISIT, Disp: , Rfl:   •  cycloSPORINE (RESTASIS) 0 05 % ophthalmic emulsion, Administer 1 drop to both eyes every 12 (twelve) hours, Disp: , Rfl:   •  doxycycline (PERIOSTAT) 20 MG tablet, Take 20 mg by mouth 2 (two) times a day with meals, Disp: , Rfl:   •  famotidine (PEPCID) 40 MG tablet, TAKE 1 TABLET BY MOUTH TWICE A DAY, Disp: 180 tablet, Rfl: 3  •  losartan (COZAAR) 25 mg tablet, Take 1 tablet (25 mg total) by mouth daily (Patient not taking: Reported on 4/9/2022 ), Disp: 30 tablet, Rfl: 3  •  metoprolol succinate (TOPROL-XL) 25 mg 24 hr tablet, Take 25 mg by mouth daily, Disp: , Rfl:   •  neomycin-polymyxin-dexamethasone (MAXITROL) ophthalmic suspension, PUT 1 DROP INTO RIGHT EYE 3 TIMES A DAY WITHOUT CONTACT, Disp: , Rfl:   •  nitrofurantoin (MACROBID) 100 mg capsule, TAKE 1 CAPSULE BY MOUTH TWICE A DAY FOR 7 DAYS, Disp: , Rfl:   •  phenazopyridine (PYRIDIUM) 200 mg tablet, TAKE 1 TABLET (200 MG TOTAL) BY MOUTH 3 (THREE) TIMES A DAY AS NEEDED (URINARY DISCOMFORT)  , Disp: , Rfl:   •  prednisoLONE acetate (PRED FORTE) 1 % ophthalmic suspension, PUT 1 DROP INTO RIGHT EYE 4 TIMES A DAY FOR 7 DAYS CONTINUE THIS MEDICATION UNTIL NEXT VISIT, Disp: , Rfl:   •  sertraline (ZOLOFT) 50 mg tablet, , Disp: , Rfl:   •  Tazorac 0 1 % cream, APPLY TOPICALLY TO AFFECTED AREAS AS DIRECTED BRAND NECESSARY, Disp: , Rfl:   •  tretinoin (REFISSA) 0 05 % cream, APPLY TOPICALLY TO AFFECTED AREAS AT NIGHT AS DIRECTED, Disp: , Rfl:     Current Allergies     Allergies as of 11/20/2022 - Reviewed 11/20/2022   Allergen Reaction Noted   • Pantoprazole Hives and Anaphylaxis 07/10/2017   • Diflucan [fluconazole]  06/22/2020   • Erythromycin GI Intolerance 02/28/2017   • Tetracycline Vomiting 01/24/2020   • Cefpodoxime Rash 12/31/2021            The following portions of the patient's history were reviewed and updated as appropriate: allergies, current medications, past family history, past medical history, past social history, past surgical history and problem list      Past Medical History:   Diagnosis Date   • Bulging lumbar disc     L1-L2   • Endometrial hyperplasia     endometrial polyp   • Factor V deficiency ([facility] [address] ) 2005   • Factor V Leiden ([facility] [address] )    • RSD lower limb        Past Surgical History:   Procedure Laterality Date   •  SECTION     • FOOT SURGERY     • MOUTH SURGERY     • [place] EXC TUMOR SOFT TISS FACE&SCALP SUBFASCIAL < 2CM N/A 3/9/2017    Procedure: EXCISION FOREHEAD MASS;  Surgeon: Bonnie Lorenz MD;  Location: [facility] OR;  Service: Plastics   • TUBAL LIGATION         Family History   Problem Relation Age of Onset   • Thyroid cancer Brother    • Breast cancer Maternal Grandmother    • Breast cancer Maternal Aunt 76        mothers twin sister   • Breast cancer Other 39        maternal         Medications have been verified  Objective   BP (!) 176/98   Pulse (!) 112   Temp (!) 100 9 °F (38 3 °C)   Resp 20   Ht 5' 7" (1 702 m)   Wt 88 5 kg (195 lb)   LMP 10/29/2022   SpO2 99%   BMI 30 54 kg/m²        Physical Exam     Physical Exam  Vitals and nursing note reviewed  Constitutional:       Appearance: Normal appearance  HENT:      Head: Normocephalic and atraumatic  Right Ear: Tympanic membrane, ear canal and external ear normal       Left Ear: Tympanic membrane, ear canal and external ear normal       Nose: Nose normal       Mouth/Throat:      Mouth: Mucous membranes are moist       Comments: Moderate, clear post nasal drip  Eyes:      Extraocular Movements: Extraocular movements intact  Pupils: Pupils are equal, round, and reactive to light  Cardiovascular:      Rate and Rhythm: Normal rate and regular rhythm  Pulmonary:      Effort: Pulmonary effort is normal       Breath sounds: Normal breath sounds  Skin:     General: Skin is warm and dry        Capillary Refill: Capillary refill takes less than 2 seconds  Neurological:      General: No focal deficit present  Mental Status: She is alert and oriented to person, place, and time     Psychiatric:         Mood and Affect: Mood normal          Behavior: Behavior normal

## 2022-12-02 ENCOUNTER — DOCUMENTATION (OUTPATIENT)
Dept: CARDIOLOGY CLINIC | Facility: CLINIC | Age: 52
End: 2022-12-02

## 2022-12-02 ENCOUNTER — OFFICE VISIT (OUTPATIENT)
Dept: CARDIOLOGY CLINIC | Facility: CLINIC | Age: 52
End: 2022-12-02

## 2022-12-02 VITALS
HEIGHT: 67 IN | OXYGEN SATURATION: 100 % | BODY MASS INDEX: 33.53 KG/M2 | DIASTOLIC BLOOD PRESSURE: 98 MMHG | WEIGHT: 213.6 LBS | HEART RATE: 90 BPM | SYSTOLIC BLOOD PRESSURE: 152 MMHG

## 2022-12-02 DIAGNOSIS — E78.5 DYSLIPIDEMIA: ICD-10-CM

## 2022-12-02 DIAGNOSIS — R00.2 PALPITATIONS: ICD-10-CM

## 2022-12-02 DIAGNOSIS — I10 PRIMARY HYPERTENSION: ICD-10-CM

## 2022-12-02 DIAGNOSIS — D68.51 FACTOR V LEIDEN MUTATION (HCC): Primary | ICD-10-CM

## 2022-12-02 RX ORDER — LOSARTAN POTASSIUM 25 MG/1
25 TABLET ORAL DAILY
Qty: 90 TABLET | Refills: 3 | Status: SHIPPED | OUTPATIENT
Start: 2022-12-02

## 2022-12-02 RX ORDER — TOBRAMYCIN AND DEXAMETHASONE 3; 1 MG/ML; MG/ML
SUSPENSION/ DROPS OPHTHALMIC
COMMUNITY
Start: 2022-11-02

## 2022-12-02 NOTE — PATIENT INSTRUCTIONS
Recommendations:  1  Start Losartan 25mg daily  2  Continue current medications  3  Consider starting statin at next appt  4  Check 1 week Zio monitor  5  Follow up in 3 months

## 2022-12-02 NOTE — PROGRESS NOTES
Cardiology   Lilia Siegel 50 y o  female MRN: [id number]        Impression:  1  Hypertension - not adequate control  Will continue metoprolol and start losartan 25mg daily  2  Dyslipidemia -    3  Palpitations - unclear etiology  Recommendations:  1  Start Losartan 25mg daily  2  Continue current medications  3  Consider starting statin at next appt  4  Check 1 week Zio monitor  5  Follow up in 3 months  HPI: Lilia Siegel is a 50y o  year old female with palpitations who presents for evaluation  Has episodes of tachycardia when exerting  Does feel fatigued when heart rate is elevated  No chest pain or shortness of breath  Review of Systems      Past Medical History:   Diagnosis Date   • Bulging lumbar disc     L1-L2   • Endometrial hyperplasia     endometrial polyp   • [facility] ([facility] ) 2005   • Factor V Leiden ([facility] )    • GERD (gastroesophageal reflux disease)    • RSD lower limb      Past Surgical History:   Procedure Laterality Date   •  SECTION     • EGD  2020    Dr Jerson Petersen  Symptoms of reflux after antibiotics  Biopsies negative for H  pylori, negative eosinophilic esophagitis, positive for reflux esophagitis     • FOOT SURGERY     • MOUTH SURGERY     • [place] EXC TUMOR SOFT TISS FACE&SCALP SUBFASCIAL < 2CM N/A 2017    Procedure: EXCISION FOREHEAD MASS;  Surgeon: Charla Cleveland MD;  Location:  MAIN OR;  Service: Plastics   • TUBAL LIGATION       Social History     Substance and Sexual Activity   Alcohol Use Not Currently    Comment: rarely     Social History     Substance and Sexual Activity   Drug Use No     Social History     Tobacco Use   Smoking Status Former   • Years: 3 00   • Types: Cigarettes   • Quit date:    • Years since quittin 9   Smokeless Tobacco Never   Tobacco Comments    "weekends"     Family History   Problem Relation Age of Onset   • Colon polyps Mother    • Thyroid cancer Brother    • Breast cancer Maternal Grandmother    • Breast cancer Maternal Aunt 76        mothers twin sister   • Breast cancer Other 39        maternal       Allergies:   Allergies   Allergen Reactions   • Pantoprazole Hives and Anaphylaxis   • Diflucan [Fluconazole]    • Erythromycin GI Intolerance   • Tetracycline Vomiting   • Cefpodoxime Rash       Medications:     Current Outpatient Medications:   •  acyclovir (ZOVIRAX) 5 % cream, , Disp: , Rfl:   •  famotidine (PEPCID) 40 MG tablet, TAKE 1 TABLET BY MOUTH TWICE A DAY, Disp: 180 tablet, Rfl: 3  •  metoprolol succinate (TOPROL-XL) 25 mg 24 hr tablet, Take 25 mg by mouth daily, Disp: , Rfl:   •  sertraline (ZOLOFT) 50 mg tablet, , Disp: , Rfl:   •  sodium picosulfate, magnesium oxide, citric acid (Clenpiq) 10-3 5-12 MG-GM -GM/160ML SOLN, Take 1 kit by mouth see administration instructions, Disp: 160 mL, Rfl: 0  •  tobramycin-dexamethasone (TOBRADEX) ophthalmic suspension, PUT 1 DROP INTO LEFT EYE 3 TIMES A DAY FOR 2 DAYS, Disp: , Rfl:   •  albuterol (PROVENTIL HFA,VENTOLIN HFA) 90 mcg/act inhaler, Inhale 2 puffs every 4 (four) hours as needed for wheezing (Patient not taking: Reported on 12/1/2022), Disp: 1 g, Rfl: 0  •  ALPRAZolam (XANAX) 0 25 mg tablet, Take 1 tablet (0 25 mg total) by mouth daily as needed for anxiety (Patient not taking: Reported on 12/10/2021), Disp: 20 tablet, Rfl: 0  •  Ascorbic Acid 100 MG CHEW, Chew 1,500 mg daily (Patient not taking: Reported on 12/1/2022), Disp: , Rfl:   •  azithromycin (ZITHROMAX) 250 mg tablet, TAKE 2 TABLETS BY MOUTH TODAY, THEN TAKE 1 TABLET DAILY FOR 4 DAYS (Patient not taking: Reported on 12/1/2022), Disp: , Rfl:   •  Cholecalciferol (VITAMIN D-3 PO), Take by mouth (Patient not taking: Reported on 12/1/2022), Disp: , Rfl:   •  clindamycin (CLEOCIN T) 1 % lotion, APPLY 1 TO 2 TIMES A DAY TO AFFECTED AREAS ON BACK/FACE (Patient not taking: Reported on 12/1/2022), Disp: , Rfl: 3  •  Coenzyme Q10 (COQ-10 PO), Take by mouth (Patient not taking: Reported on 3/15/2022), Disp: , Rfl:   •  cyclopentolate (CYCLOGYL) 1 % ophthalmic solution, PUT 1 DROP INTO RIGHT EYE 2X A DAY FOR 30 DAYS  CONTINUE THIS MEDICATION UNTIL YOUR NEXT VISIT (Patient not taking: Reported on 12/1/2022), Disp: , Rfl:   •  cycloSPORINE (RESTASIS) 0 05 % ophthalmic emulsion, Administer 1 drop to both eyes every 12 (twelve) hours (Patient not taking: Reported on 12/1/2022), Disp: , Rfl:   •  doxycycline (PERIOSTAT) 20 MG tablet, Take 20 mg by mouth 2 (two) times a day with meals (Patient not taking: Reported on 12/1/2022), Disp: , Rfl:   •  fluticasone (FLONASE) 50 mcg/act nasal spray, 1 spray into each nostril daily (Patient not taking: Reported on 12/1/2022), Disp: 9 9 mL, Rfl: 0  •  losartan (COZAAR) 25 mg tablet, Take 1 tablet (25 mg total) by mouth daily (Patient not taking: Reported on 4/9/2022), Disp: 30 tablet, Rfl: 3  •  neomycin-polymyxin-dexamethasone (MAXITROL) ophthalmic suspension, PUT 1 DROP INTO RIGHT EYE 3 TIMES A DAY WITHOUT CONTACT (Patient not taking: Reported on 12/1/2022), Disp: , Rfl:   •  nitrofurantoin (MACROBID) 100 mg capsule, TAKE 1 CAPSULE BY MOUTH TWICE A DAY FOR 7 DAYS (Patient not taking: Reported on 12/1/2022), Disp: , Rfl:   •  phenazopyridine (PYRIDIUM) 200 mg tablet, TAKE 1 TABLET (200 MG TOTAL) BY MOUTH 3 (THREE) TIMES A DAY AS NEEDED (URINARY DISCOMFORT)   (Patient not taking: Reported on 12/1/2022), Disp: , Rfl:   •  prednisoLONE acetate (PRED FORTE) 1 % ophthalmic suspension, PUT 1 DROP INTO RIGHT EYE 4 TIMES A DAY FOR 7 DAYS CONTINUE THIS MEDICATION UNTIL NEXT VISIT (Patient not taking: Reported on 12/1/2022), Disp: , Rfl:   •  Tazorac 0 1 % cream, APPLY TOPICALLY TO AFFECTED AREAS AS DIRECTED BRAND NECESSARY (Patient not taking: Reported on 12/1/2022), Disp: , Rfl:   •  tretinoin (REFISSA) 0 05 % cream, APPLY TOPICALLY TO AFFECTED AREAS AT NIGHT AS DIRECTED (Patient not taking: Reported on 12/1/2022), Disp: , Rfl:       Wt Readings from Last 3 Encounters:   12/02/22 96 9 kg (213 lb 9 6 oz)   12/01/22 96 6 kg (213 lb)   11/20/22 88 5 kg (195 lb)     Temp Readings from Last 3 Encounters:   12/01/22 99 9 °F (37 7 °C) (Temporal)   11/20/22 (!) 100 9 °F (38 3 °C)   10/17/22 98 9 °F (37 2 °C)     BP Readings from Last 3 Encounters:   12/02/22 152/98   12/01/22 170/80   11/20/22 (!) 176/98     Pulse Readings from Last 3 Encounters:   12/02/22 90   12/01/22 63   11/20/22 (!) 112         Physical Exam  HENT:      Head: Atraumatic  Mouth/Throat:      Mouth: Mucous membranes are moist    Eyes:      Extraocular Movements: Extraocular movements intact  Cardiovascular:      Rate and Rhythm: Normal rate and regular rhythm  Heart sounds: Normal heart sounds  Pulmonary:      Effort: Pulmonary effort is normal       Breath sounds: Normal breath sounds  Abdominal:      General: Abdomen is flat  Musculoskeletal:         General: Normal range of motion  Cervical back: Normal range of motion  Skin:     General: Skin is warm  Neurological:      General: No focal deficit present  Mental Status: She is alert and oriented to person, place, and time     Psychiatric:         Mood and Affect: Mood normal          Behavior: Behavior normal            Laboratory Studies:  CMP:  Lab Results   Component Value Date    K 4 5 07/16/2020     07/16/2020    CO2 26 07/16/2020    BUN 10 07/16/2020    CREATININE 0 81 07/16/2020    AST 16 07/16/2020    ALT 46 07/16/2020    EGFR 87 07/16/2020       Lipid Profile:   No results found for: CHOL  Lab Results   Component Value Date    HDL 44 07/16/2020     Lab Results   Component Value Date    LDLCALC 187 (H) 07/16/2020     Lab Results   Component Value Date    TRIG 184 (H) 07/16/2020       Cardiac testing:   EKG reviewed personally:

## 2022-12-16 ENCOUNTER — CLINICAL SUPPORT (OUTPATIENT)
Dept: CARDIOLOGY CLINIC | Facility: CLINIC | Age: 52
End: 2022-12-16

## 2022-12-16 DIAGNOSIS — R00.2 PALPITATIONS: ICD-10-CM

## 2022-12-16 PROBLEM — Z01.419 PAP SMEAR, AS PART OF ROUTINE GYNECOLOGICAL EXAMINATION: Status: RESOLVED | Noted: 2022-10-17 | Resolved: 2022-12-16

## 2022-12-16 PROBLEM — Z01.419 ENCOUNTER FOR ROUTINE GYNECOLOGICAL EXAMINATION: Status: RESOLVED | Noted: 2022-10-17 | Resolved: 2022-12-16

## 2022-12-22 ENCOUNTER — APPOINTMENT (OUTPATIENT)
Dept: CT IMAGING | Facility: HOSPITAL | Age: 52
End: 2022-12-22

## 2022-12-22 ENCOUNTER — HOSPITAL ENCOUNTER (OUTPATIENT)
Facility: HOSPITAL | Age: 52
Setting detail: OBSERVATION
Discharge: HOME | End: 2022-12-24
Attending: EMERGENCY MEDICINE | Admitting: INTERNAL MEDICINE

## 2022-12-22 DIAGNOSIS — K21.00 GASTROESOPHAGEAL REFLUX DISEASE WITH ESOPHAGITIS WITHOUT HEMORRHAGE: ICD-10-CM

## 2022-12-22 DIAGNOSIS — D64.9 ANEMIA: ICD-10-CM

## 2022-12-22 DIAGNOSIS — R11.0 NAUSEA: ICD-10-CM

## 2022-12-22 DIAGNOSIS — E86.0 DEHYDRATION: ICD-10-CM

## 2022-12-22 DIAGNOSIS — D64.9 CHRONIC ANEMIA: ICD-10-CM

## 2022-12-22 DIAGNOSIS — R19.7 DIARRHEA: Primary | ICD-10-CM

## 2022-12-22 PROBLEM — R65.10 SIRS (SYSTEMIC INFLAMMATORY RESPONSE SYNDROME) (HCC): Status: ACTIVE | Noted: 2022-12-22

## 2022-12-22 LAB
2HR DELTA HS TROPONIN: -3 NG/L
ALBUMIN SERPL BCP-MCNC: 3.2 G/DL (ref 3.5–5)
ALP SERPL-CCNC: 79 U/L (ref 46–116)
ALT SERPL W P-5'-P-CCNC: 30 U/L (ref 12–78)
ANION GAP SERPL CALCULATED.3IONS-SCNC: 10 MMOL/L (ref 4–13)
AST SERPL W P-5'-P-CCNC: 19 U/L (ref 5–45)
ATRIAL RATE: 118 BPM
ATRIAL RATE: 119 BPM
BASOPHILS # BLD AUTO: 0.02 THOUSANDS/ÂΜL (ref 0–0.1)
BASOPHILS NFR BLD AUTO: 0 % (ref 0–1)
BILIRUB SERPL-MCNC: 0.46 MG/DL (ref 0.2–1)
BUN SERPL-MCNC: 17 MG/DL (ref 5–25)
CALCIUM ALBUM COR SERPL-MCNC: 9.3 MG/DL (ref 8.3–10.1)
CALCIUM SERPL-MCNC: 8.7 MG/DL (ref 8.3–10.1)
CARDIAC TROPONIN I PNL SERPL HS: 17 NG/L
CARDIAC TROPONIN I PNL SERPL HS: 20 NG/L
CHLORIDE SERPL-SCNC: 103 MMOL/L (ref 96–108)
CO2 SERPL-SCNC: 22 MMOL/L (ref 21–32)
CREAT SERPL-MCNC: 0.88 MG/DL (ref 0.6–1.3)
EOSINOPHIL # BLD AUTO: 0.03 THOUSAND/ÂΜL (ref 0–0.61)
EOSINOPHIL NFR BLD AUTO: 0 % (ref 0–6)
ERYTHROCYTE [DISTWIDTH] IN BLOOD BY AUTOMATED COUNT: 17.9 % (ref 11.6–15.1)
FERRITIN SERPL-MCNC: 5 NG/ML (ref 8–388)
FLUAV RNA RESP QL NAA+PROBE: NEGATIVE
FLUBV RNA RESP QL NAA+PROBE: NEGATIVE
GFR SERPL CREATININE-BSD FRML MDRD: 76 ML/MIN/1.73SQ M
GLUCOSE SERPL-MCNC: 103 MG/DL (ref 65–140)
HCT VFR BLD AUTO: 29.1 % (ref 34.8–46.1)
HGB BLD-MCNC: 8.8 G/DL (ref 11.5–15.4)
IMM GRANULOCYTES # BLD AUTO: 0.04 THOUSAND/UL (ref 0–0.2)
IMM GRANULOCYTES NFR BLD AUTO: 0 % (ref 0–2)
IRON SATN MFR SERPL: 6 % (ref 15–50)
IRON SERPL-MCNC: 26 UG/DL (ref 50–170)
LACTATE SERPL-SCNC: 1.2 MMOL/L (ref 0.5–2)
LYMPHOCYTES # BLD AUTO: 0.68 THOUSANDS/ÂΜL (ref 0.6–4.47)
LYMPHOCYTES NFR BLD AUTO: 7 % (ref 14–44)
MAGNESIUM SERPL-MCNC: 1.4 MG/DL (ref 1.6–2.6)
MCH RBC QN AUTO: 23.8 PG (ref 26.8–34.3)
MCHC RBC AUTO-ENTMCNC: 30.2 G/DL (ref 31.4–37.4)
MCV RBC AUTO: 79 FL (ref 82–98)
MONOCYTES # BLD AUTO: 0.59 THOUSAND/ÂΜL (ref 0.17–1.22)
MONOCYTES NFR BLD AUTO: 6 % (ref 4–12)
NEUTROPHILS # BLD AUTO: 8.93 THOUSANDS/ÂΜL (ref 1.85–7.62)
NEUTS SEG NFR BLD AUTO: 87 % (ref 43–75)
NRBC BLD AUTO-RTO: 0 /100 WBCS
P AXIS: 65 DEGREES
P AXIS: 72 DEGREES
PLATELET # BLD AUTO: 290 THOUSANDS/UL (ref 149–390)
PLATELET # BLD AUTO: 350 THOUSANDS/UL (ref 149–390)
PMV BLD AUTO: 11.2 FL (ref 8.9–12.7)
PMV BLD AUTO: 11.2 FL (ref 8.9–12.7)
POTASSIUM SERPL-SCNC: 3.9 MMOL/L (ref 3.5–5.3)
PR INTERVAL: 152 MS
PR INTERVAL: 164 MS
PROCALCITONIN SERPL-MCNC: <0.05 NG/ML
PROT SERPL-MCNC: 7.7 G/DL (ref 6.4–8.4)
QRS AXIS: 76 DEGREES
QRS AXIS: 82 DEGREES
QRSD INTERVAL: 82 MS
QRSD INTERVAL: 88 MS
QT INTERVAL: 310 MS
QT INTERVAL: 326 MS
QTC INTERVAL: 434 MS
QTC INTERVAL: 458 MS
RBC # BLD AUTO: 3.69 MILLION/UL (ref 3.81–5.12)
RSV RNA RESP QL NAA+PROBE: NEGATIVE
SARS-COV-2 RNA RESP QL NAA+PROBE: NEGATIVE
SODIUM SERPL-SCNC: 135 MMOL/L (ref 135–147)
T WAVE AXIS: 62 DEGREES
T WAVE AXIS: 66 DEGREES
TIBC SERPL-MCNC: 468 UG/DL (ref 250–450)
VENTRICULAR RATE: 118 BPM
VENTRICULAR RATE: 119 BPM
WBC # BLD AUTO: 10.29 THOUSAND/UL (ref 4.31–10.16)

## 2022-12-22 RX ORDER — FAMOTIDINE 20 MG/1
40 TABLET, FILM COATED ORAL DAILY
Status: DISCONTINUED | OUTPATIENT
Start: 2022-12-22 | End: 2022-12-23

## 2022-12-22 RX ORDER — CALCIUM CARBONATE 200(500)MG
1000 TABLET,CHEWABLE ORAL DAILY PRN
Status: DISCONTINUED | OUTPATIENT
Start: 2022-12-22 | End: 2022-12-24 | Stop reason: HOSPADM

## 2022-12-22 RX ORDER — METOCLOPRAMIDE HYDROCHLORIDE 5 MG/ML
10 INJECTION INTRAMUSCULAR; INTRAVENOUS ONCE
Status: COMPLETED | OUTPATIENT
Start: 2022-12-22 | End: 2022-12-22

## 2022-12-22 RX ORDER — ONDANSETRON 2 MG/ML
4 INJECTION INTRAMUSCULAR; INTRAVENOUS EVERY 6 HOURS PRN
Status: DISCONTINUED | OUTPATIENT
Start: 2022-12-22 | End: 2022-12-24 | Stop reason: HOSPADM

## 2022-12-22 RX ORDER — ONDANSETRON 2 MG/ML
1 INJECTION INTRAMUSCULAR; INTRAVENOUS ONCE
Status: COMPLETED | OUTPATIENT
Start: 2022-12-22 | End: 2022-12-22

## 2022-12-22 RX ORDER — METOPROLOL SUCCINATE 25 MG/1
25 TABLET, EXTENDED RELEASE ORAL DAILY
Status: DISCONTINUED | OUTPATIENT
Start: 2022-12-22 | End: 2022-12-24 | Stop reason: HOSPADM

## 2022-12-22 RX ORDER — FAMOTIDINE 10 MG/ML
20 INJECTION, SOLUTION INTRAVENOUS ONCE
Status: COMPLETED | OUTPATIENT
Start: 2022-12-22 | End: 2022-12-22

## 2022-12-22 RX ORDER — DICYCLOMINE HCL 20 MG
20 TABLET ORAL ONCE
Status: COMPLETED | OUTPATIENT
Start: 2022-12-22 | End: 2022-12-22

## 2022-12-22 RX ORDER — KETOROLAC TROMETHAMINE 30 MG/ML
15 INJECTION, SOLUTION INTRAMUSCULAR; INTRAVENOUS ONCE
Status: COMPLETED | OUTPATIENT
Start: 2022-12-22 | End: 2022-12-22

## 2022-12-22 RX ORDER — ACETAMINOPHEN 325 MG/1
975 TABLET ORAL ONCE
Status: COMPLETED | OUTPATIENT
Start: 2022-12-22 | End: 2022-12-22

## 2022-12-22 RX ORDER — SODIUM CHLORIDE 9 MG/ML
125 INJECTION, SOLUTION INTRAVENOUS CONTINUOUS
Status: DISCONTINUED | OUTPATIENT
Start: 2022-12-22 | End: 2022-12-23

## 2022-12-22 RX ORDER — ACETAMINOPHEN 325 MG/1
650 TABLET ORAL EVERY 6 HOURS PRN
Status: DISCONTINUED | OUTPATIENT
Start: 2022-12-22 | End: 2022-12-23

## 2022-12-22 RX ORDER — ENOXAPARIN SODIUM 100 MG/ML
40 INJECTION SUBCUTANEOUS DAILY
Status: DISCONTINUED | OUTPATIENT
Start: 2022-12-22 | End: 2022-12-24 | Stop reason: HOSPADM

## 2022-12-22 RX ADMIN — KETOROLAC TROMETHAMINE 15 MG: 30 INJECTION, SOLUTION INTRAMUSCULAR; INTRAVENOUS at 23:50

## 2022-12-22 RX ADMIN — DICYCLOMINE HYDROCHLORIDE 20 MG: 20 TABLET ORAL at 02:07

## 2022-12-22 RX ADMIN — SERTRALINE HYDROCHLORIDE 50 MG: 50 TABLET ORAL at 10:58

## 2022-12-22 RX ADMIN — SODIUM CHLORIDE 125 ML/HR: 0.9 INJECTION, SOLUTION INTRAVENOUS at 10:59

## 2022-12-22 RX ADMIN — SODIUM CHLORIDE 3 G: 9 INJECTION, SOLUTION INTRAVENOUS at 19:20

## 2022-12-22 RX ADMIN — IOHEXOL 100 ML: 350 INJECTION, SOLUTION INTRAVENOUS at 22:46

## 2022-12-22 RX ADMIN — KETOROLAC TROMETHAMINE 15 MG: 30 INJECTION, SOLUTION INTRAMUSCULAR; INTRAVENOUS at 17:51

## 2022-12-22 RX ADMIN — FAMOTIDINE 20 MG: 10 INJECTION, SOLUTION INTRAVENOUS at 02:07

## 2022-12-22 RX ADMIN — FAMOTIDINE 40 MG: 20 TABLET, FILM COATED ORAL at 10:58

## 2022-12-22 RX ADMIN — GLYCERIN, HYPROMELLOSE, POLYETHYLENE GLYCOL 1 DROP: .2; .2; 1 LIQUID OPHTHALMIC at 20:16

## 2022-12-22 RX ADMIN — ACETAMINOPHEN 650 MG: 325 TABLET, FILM COATED ORAL at 12:57

## 2022-12-22 RX ADMIN — ENOXAPARIN SODIUM 40 MG: 40 INJECTION SUBCUTANEOUS at 10:59

## 2022-12-22 RX ADMIN — ACETAMINOPHEN 650 MG: 325 TABLET, FILM COATED ORAL at 19:18

## 2022-12-22 RX ADMIN — ACETAMINOPHEN 975 MG: 325 TABLET, FILM COATED ORAL at 02:07

## 2022-12-22 RX ADMIN — SODIUM CHLORIDE 1000 ML: 0.9 INJECTION, SOLUTION INTRAVENOUS at 02:15

## 2022-12-22 RX ADMIN — KETOROLAC TROMETHAMINE 15 MG: 30 INJECTION, SOLUTION INTRAMUSCULAR; INTRAVENOUS at 08:53

## 2022-12-22 RX ADMIN — METOCLOPRAMIDE 10 MG: 5 INJECTION, SOLUTION INTRAMUSCULAR; INTRAVENOUS at 06:20

## 2022-12-22 RX ADMIN — METOPROLOL SUCCINATE 25 MG: 25 TABLET, EXTENDED RELEASE ORAL at 10:58

## 2022-12-22 NOTE — PLAN OF CARE
Problem: Potential for Falls  Goal: Patient will remain free of falls  Description: INTERVENTIONS:  - Educate patient/family on patient safety including physical limitations  - Instruct patient to call for assistance with activity   - Consult OT/PT to assist with strengthening/mobility   - Keep Call bell within reach  - Keep bed low and locked with side rails adjusted as appropriate  - Keep care items and personal belongings within reach  - Initiate and maintain comfort rounds  - Make Fall Risk Sign visible to staff  - Apply yellow socks and bracelet for high fall risk patients  - Consider moving patient to room near nurses station  Outcome: Progressing     Problem: PAIN - ADULT  Goal: Verbalizes/displays adequate comfort level or baseline comfort level  Description: Interventions:  - Encourage patient to monitor pain and request assistance  - Assess pain using appropriate pain scale  - Administer analgesics based on type and severity of pain and evaluate response  - Implement non-pharmacological measures as appropriate and evaluate response  - Consider cultural and social influences on pain and pain management  - Notify physician/advanced practitioner if interventions unsuccessful or patient reports new pain  Outcome: Progressing     Problem: INFECTION - ADULT  Goal: Absence or prevention of progression during hospitalization  Description: INTERVENTIONS:  - Assess and monitor for signs and symptoms of infection  - Monitor lab/diagnostic results  - Monitor all insertion sites, i e  indwelling lines, tubes, and drains  - Monitor endotracheal if appropriate and nasal secretions for changes in amount and color  - [place] appropriate cooling/warming therapies per order  - Administer medications as ordered  - Instruct and encourage patient and family to use good hand hygiene technique  - Identify and instruct in appropriate isolation precautions for identified infection/condition  Outcome: Progressing  Goal: Absence of fever/infection during neutropenic period  Description: INTERVENTIONS:  - Monitor WBC    Outcome: Progressing     Problem: SAFETY ADULT  Goal: Patient will remain free of falls  Description: INTERVENTIONS:  - Educate patient/family on patient safety including physical limitations  - Instruct patient to call for assistance with activity   - Consult OT/PT to assist with strengthening/mobility   - Keep Call bell within reach  - Keep bed low and locked with side rails adjusted as appropriate  - Keep care items and personal belongings within reach  - Initiate and maintain comfort rounds  - Make Fall Risk Sign visible to staff  - Apply yellow socks and bracelet for high fall risk patients  - Consider moving patient to room near nurses station  Outcome: Progressing  Goal: Maintain or return to baseline ADL function  Description: INTERVENTIONS:  -  Assess patient's ability to carry out ADLs; assess patient's baseline for ADL function and identify physical deficits which impact ability to perform ADLs (bathing, care of mouth/teeth, toileting, grooming, dressing, etc )  - Assess/evaluate cause of self-care deficits   - Assess range of motion  - Assess patient's mobility; develop plan if impaired  - Assess patient's need for assistive devices and provide as appropriate  - Encourage maximum independence but intervene and supervise when necessary  - Involve family in performance of ADLs  - Assess for home care needs following discharge   - Consider OT consult to assist with ADL evaluation and planning for discharge  - Provide patient education as appropriate  Outcome: Progressing  Goal: Maintains/Returns to pre admission functional level  Description: INTERVENTIONS:  - Perform BMAT or MOVE assessment daily    - Set and communicate daily mobility goal to care team and patient/family/caregiver     - Collaborate with rehabilitation services on mobility goals if consulted  - Out of bed for toileting  - Record patient progress and toleration of activity level   Outcome: Progressing     Problem: DISCHARGE PLANNING  Goal: Discharge to home or other facility with appropriate resources  Description: INTERVENTIONS:  - Identify barriers to discharge w/patient and caregiver  - Arrange for needed discharge resources and transportation as appropriate  - Identify discharge learning needs (meds, wound care, etc )  - Arrange for interpretive services to assist at discharge as needed  - Refer to Case Management Department for coordinating discharge planning if the patient needs post-hospital services based on physician/advanced practitioner order or complex needs related to functional status, cognitive ability, or social support system  Outcome: Progressing     Problem: Knowledge Deficit  Goal: Patient/family/caregiver demonstrates understanding of disease process, treatment plan, medications, and discharge instructions  Description: Complete learning assessment and assess knowledge base    Interventions:  - Provide teaching at level of understanding  - Provide teaching via preferred learning methods  Outcome: Progressing     Problem: GASTROINTESTINAL - ADULT  Goal: Minimal or absence of nausea and/or vomiting  Description: INTERVENTIONS:  - Administer IV fluids if ordered to ensure adequate hydration  - Maintain NPO status until nausea and vomiting are resolved  - Nasogastric tube if ordered  - Administer ordered antiemetic medications as needed  - Provide nonpharmacologic comfort measures as appropriate  - Advance diet as tolerated, if ordered  - Consider nutrition services referral to assist patient with adequate nutrition and appropriate food choices  Outcome: Progressing  Goal: Maintains or returns to baseline bowel function  Description: INTERVENTIONS:  - Assess bowel function  - Encourage oral fluids to ensure adequate hydration  - Administer IV fluids if ordered to ensure adequate hydration  - Administer ordered medications as needed  - Encourage mobilization and activity  - Consider nutritional services referral to assist patient with adequate nutrition and appropriate food choices  Outcome: Progressing  Goal: Maintains adequate nutritional intake  Description: INTERVENTIONS:  - Monitor percentage of each meal consumed  - Identify factors contributing to decreased intake, treat as appropriate  - Assist with meals as needed  - Monitor I&O, weight, and lab values if indicated  - Obtain nutrition services referral as needed  Outcome: Progressing     Problem: METABOLIC, FLUID AND ELECTROLYTES - ADULT  Goal: Electrolytes maintained within normal limits  Description: INTERVENTIONS:  - Monitor labs and assess patient for signs and symptoms of electrolyte imbalances  - Administer electrolyte replacement as ordered  - Monitor response to electrolyte replacements, including repeat lab results as appropriate  - Instruct patient on fluid and nutrition as appropriate  Outcome: Progressing  Goal: Fluid balance maintained  Description: INTERVENTIONS:  - Monitor labs   - Monitor I/O and WT  - Instruct patient on fluid and nutrition as appropriate  - Assess for signs & symptoms of volume excess or deficit  Outcome: Progressing

## 2022-12-22 NOTE — ASSESSMENT & PLAN NOTE
· Evidenced by fever, tachycardia  · Tachycardia likely secondary to dehydration  · Fever likely secondary to viral/ bacterial gastroenteritis   · Blood cultures   · UA

## 2022-12-22 NOTE — ED PROVIDER NOTES
History  Chief Complaint   Patient presents with   • Diarrhea     Pt came in via EMS, pt reports diarrhea, nausea, headaches, dizziness  Denies other symptoms  Presents with several hours of diarrhea, nausea, headache, lightheadedness and near syncope with some chest discomfort  Patient arrives via EMS tonight because she states that she has had over 25 episodes of diarrhea and when she was standing up she almost passed out  At that time patient also did develop some chest discomfort which is now gone  Patient was concerned for being at home and possibly passing out which is what prompted her 911 call  Patient states that symptoms started about an hour after eating a Shwarma  Patient has had nausea but no vomiting  Prior to this the patient was in her normal state of health  History provided by:  Patient   used: No    Diarrhea  Quality:  Copious  Severity:  Severe  Onset quality:  Gradual  Timing:  Constant  Progression:  Worsening  Associated symptoms: abdominal pain and headaches    Associated symptoms: no chills, no diaphoresis, no fever and no vomiting    Risk factors: no recent antibiotic use and no travel to endemic areas        Prior to Admission Medications   Prescriptions Last Dose Informant Patient Reported? Taking?    ALPRAZolam (XANAX) 0 25 mg tablet   No No   Sig: Take 1 tablet (0 25 mg total) by mouth daily as needed for anxiety   Patient not taking: Reported on 12/10/2021   Ascorbic Acid 100 MG CHEW Not Taking  Yes No   Sig: Chew 1,500 mg daily   Patient not taking: Reported on 12/1/2022   Cholecalciferol (VITAMIN D-3 PO) Not Taking  Yes No   Sig: Take by mouth   Patient not taking: Reported on 12/1/2022   Coenzyme Q10 (COQ-10 PO)   Yes No   Sig: Take by mouth   Patient not taking: Reported on 3/15/2022   Tazorac 0 1 % cream Not Taking  Yes No   Sig: APPLY TOPICALLY TO AFFECTED AREAS AS DIRECTED BRAND NECESSARY   Patient not taking: Reported on 12/1/2022   acyclovir (ZOVIRAX) 5 % cream Not Taking  Yes No   Patient not taking: Reported on 2022   albuterol (PROVENTIL HFA,VENTOLIN HFA) 90 mcg/act inhaler   No No   Sig: Inhale 2 puffs every 4 (four) hours as needed for wheezing   Patient not taking: Reported on 2022   azithromycin (ZITHROMAX) 250 mg tablet   Yes No   Sig: TAKE 2 TABLETS BY MOUTH TODAY, THEN TAKE 1 TABLET DAILY FOR 4 DAYS   Patient not taking: Reported on 2022   clindamycin (CLEOCIN T) 1 % lotion Not Taking  Yes No   Sig: APPLY 1 TO 2 TIMES A DAY TO AFFECTED AREAS ON BACK/FACE   Patient not taking: Reported on 2022   cycloSPORINE (RESTASIS) 0 05 % ophthalmic emulsion Unknown  Yes No   Sig: Administer 1 drop to both eyes every 12 (twelve) hours   cyclopentolate (CYCLOGYL) 1 % ophthalmic solution Not Taking  Yes No   Sig: PUT 1 DROP INTO RIGHT EYE 2X A DAY FOR 30 DAYS   [address] YOUR NEXT VISIT   Patient not taking: Reported on 2022   doxycycline (PERIOSTAT) 20 MG tablet   Yes No   Sig: Take 20 mg by mouth 2 (two) times a day with meals   Patient not taking: Reported on 2022   famotidine (PEPCID) 40 MG tablet   No Yes   Sig: TAKE 1 TABLET BY MOUTH TWICE A DAY   fluticasone (FLONASE) 50 mcg/act nasal spray Not Taking  No No   Si spray into each nostril daily   Patient not taking: Reported on 2022   losartan (COZAAR) 25 mg tablet   No No   Sig: Take 1 tablet (25 mg total) by mouth daily   Patient not taking: Reported on 2022   losartan (COZAAR) 25 mg tablet Not Taking  No No   Sig: Take 1 tablet (25 mg total) by mouth daily   Patient not taking: Reported on 2022   metoprolol succinate (TOPROL-XL) 25 mg 24 hr tablet   Yes Yes   Sig: Take 25 mg by mouth daily   neomycin-polymyxin-dexamethasone (MAXITROL) ophthalmic suspension Not Taking  Yes No   Sig: PUT 1 DROP INTO RIGHT EYE 3 TIMES A DAY WITHOUT CONTACT   Patient not taking: Reported on 2022   nitrofurantoin (MACROBID) 100 mg capsule   Yes No Sig: TAKE 1 CAPSULE BY MOUTH TWICE A DAY FOR 7 DAYS   Patient not taking: Reported on 2022   phenazopyridine (PYRIDIUM) 200 mg tablet   Yes No   Sig: TAKE 1 TABLET (200 MG TOTAL) BY MOUTH 3 (THREE) TIMES A DAY AS NEEDED (URINARY DISCOMFORT)  Patient not taking: Reported on 2022   prednisoLONE acetate (PRED FORTE) 1 % ophthalmic suspension   Yes No   Sig: PUT 1 DROP INTO RIGHT EYE 4 TIMES A DAY FOR 7 DAYS CONTINUE THIS MEDICATION UNTIL NEXT VISIT   Patient not taking: Reported on 2022   sertraline (ZOLOFT) 50 mg tablet   Yes Yes   sodium picosulfate, magnesium oxide, citric acid (Clenpiq) 10-3 5-12 MG-GM -GM/160ML SOLN Not Taking  No No   Sig: Take 1 kit by mouth see administration instructions   Patient not taking: Reported on 2022   tobramycin-dexamethasone (TOBRADEX) ophthalmic suspension Not Taking  Yes No   Sig: PUT 1 DROP INTO LEFT EYE 3 TIMES A DAY FOR 2 DAYS   Patient not taking: Reported on 2022   tretinoin (REFISSA) 0 05 % cream Not Taking  Yes No   Sig: APPLY TOPICALLY TO AFFECTED AREAS AT NIGHT AS DIRECTED   Patient not taking: Reported on 2022      Facility-Administered Medications: None       Past Medical History:   Diagnosis Date   • Bulging lumbar disc     L1-L2   • Endometrial hyperplasia     endometrial polyp   • Factor V deficiency ([facility] [address] ) 2005   • Factor V Leiden ([facility] [address] )    • GERD (gastroesophageal reflux disease)    • RSD lower limb        Past Surgical History:   Procedure Laterality Date   •  SECTION     • EGD  2020    Dr Najera Null  Symptoms of reflux after antibiotics  Biopsies negative for H  pylori, negative eosinophilic esophagitis, positive for reflux esophagitis     • FOOT SURGERY     • MOUTH SURGERY     • [place] EXC TUMOR SOFT TISS FACE&SCALP SUBFASCIAL < 2CM N/A 2017    Procedure: EXCISION FOREHEAD MASS;  Surgeon: Dinorah Oglesby MD;  Location:  MAIN OR;  Service: Plastics   • TUBAL LIGATION         Family History   Problem Relation Age of Onset   • Colon polyps Mother    • Thyroid cancer Brother    • Breast cancer Maternal Grandmother    • Breast cancer Maternal Aunt 76        mothers twin sister   • Breast cancer Other 39        maternal     I have reviewed and agree with the history as documented  E-Cigarette/Vaping   • E-Cigarette Use Never User      E-Cigarette/Vaping Substances   • Nicotine No    • THC No    • CBD No    • Flavoring No    • Other No    • Unknown No      Social History     Tobacco Use   • Smoking status: Former     Years: 3 00     Types: Cigarettes     Quit date:      Years since quittin 9   • Smokeless tobacco: Never   • Tobacco comments:     "weekends"   Vaping Use   • Vaping Use: Never used   Substance Use Topics   • Alcohol use: Not Currently     Comment: rarely   • Drug use: No       Review of Systems   Constitutional: Negative for chills, diaphoresis and fever  Respiratory: Negative for cough, chest tightness and shortness of breath  Cardiovascular: Negative for chest pain  Gastrointestinal: Positive for abdominal pain, diarrhea and nausea  Negative for abdominal distention, blood in stool, constipation and vomiting  Genitourinary: Negative for difficulty urinating, dysuria, flank pain, frequency, hematuria and urgency  Skin: Negative for color change, pallor, rash and wound  Allergic/Immunologic: Negative for immunocompromised state  Neurological: Positive for headaches  Negative for dizziness and light-headedness  All other systems reviewed and are negative  Physical Exam  Physical Exam  Vitals and nursing note reviewed  Constitutional:       General: She is not in acute distress  Appearance: She is well-developed  She is not ill-appearing, toxic-appearing or diaphoretic  HENT:      Head: Normocephalic and atraumatic  Right Ear: External ear normal       Left Ear: External ear normal       Nose: Nose normal  No congestion or rhinorrhea     Eyes:      General: No scleral icterus  Right eye: No discharge  Left eye: No discharge  Conjunctiva/sclera: Conjunctivae normal       Pupils: Pupils are equal, round, and reactive to light  Neck:      Trachea: No tracheal deviation  Cardiovascular:      Rate and Rhythm: Regular rhythm  Tachycardia present  Heart sounds: Normal heart sounds  No murmur heard  No friction rub  Pulmonary:      Effort: Pulmonary effort is normal  No tachypnea, accessory muscle usage or respiratory distress  Breath sounds: Normal breath sounds  No stridor  No wheezing or rales  Abdominal:      General: There is no distension  Palpations: Abdomen is soft  Tenderness: There is no abdominal tenderness  There is no guarding or rebound  Musculoskeletal:         General: No tenderness or deformity  Normal range of motion  Cervical back: Normal range of motion and neck supple  Skin:     General: Skin is warm and dry  Neurological:      General: No focal deficit present  Mental Status: She is alert and oriented to person, place, and time  She is not disoriented        Gait: Gait normal    Psychiatric:         Speech: Speech normal          Behavior: Behavior normal          Vital Signs  ED Triage Vitals   Temperature Pulse Respirations Blood Pressure SpO2   12/22/22 0127 12/22/22 0127 12/22/22 0127 12/22/22 0127 12/22/22 0127   100 3 °F (37 9 °C) (!) 114 20 149/71 100 %      Temp Source Heart Rate Source Patient Position - Orthostatic VS BP Location FiO2 (%)   12/22/22 0127 12/22/22 0127 12/22/22 0127 12/22/22 0127 --   Oral Monitor Lying Right arm       Pain Score       12/22/22 0207       4           Vitals:    12/22/22 0127 12/22/22 0330 12/22/22 0515 12/22/22 0603   BP: 149/71 126/56  146/70   Pulse: (!) 114 (!) 114 98 98   Patient Position - Orthostatic VS: Lying Lying  Lying         Visual Acuity      ED Medications  Medications   ketorolac (TORADOL) injection 15 mg (15 mg Intravenous Not Given 12/22/22 0215)   ondansetron (FOR EMS ONLY) (ZOFRAN) 4 mg/2 mL injection 4 mg (0 mg Does not apply Given to EMS 12/22/22 0127)   sodium chloride 0 9 % bolus 1,000 mL (0 mL Intravenous Stopped 12/22/22 0408)   acetaminophen (TYLENOL) tablet 975 mg (975 mg Oral Given 12/22/22 0207)   dicyclomine (BENTYL) tablet 20 mg (20 mg Oral Given 12/22/22 0207)   Famotidine (PF) (PEPCID) injection 20 mg (20 mg Intravenous Given 12/22/22 0207)   metoclopramide (REGLAN) injection 10 mg (10 mg Intravenous Given 12/22/22 0620)       Diagnostic Studies  Results Reviewed     Procedure Component Value Units Date/Time    Giardia antigen [[id number]]     Lab Status: No result Specimen: Stool     Stool Enteric Bacterial Panel by PCR [[id number]]     Lab Status: No result Specimen: Stool     Clostridium difficile toxin by PCR with EIA [[id number]]     Lab Status: No result Specimen: Stool from Per Rectum     Ova and parasite examination [[id number]]     Lab Status: No result Specimen: Stool from Rectum     HS Troponin I 2hr [[id number]]  (Normal) Collected: 12/22/22 0408    Lab Status: Final result Specimen: Blood from Arm, Left Updated: 12/22/22 0509     hs TnI 2hr 17 ng/L      Delta 2hr hsTnI -3 ng/L     FLU/RSV/COVID - if FLU/RSV clinically relevant [[id number]]  (Normal) Collected: 12/22/22 0206    Lab Status: Final result Specimen: Nares from Nose Updated: 12/22/22 0307     SARS-CoV-2 Negative     INFLUENZA A PCR Negative     INFLUENZA B PCR Negative     RSV PCR Negative    Narrative:      FOR PEDIATRIC PATIENTS - copy/paste COVID Guidelines URL to browser: [email or web address]    SARS-CoV-2 assay is a Nucleic Acid Amplification assay intended for the  qualitative detection of nucleic acid from SARS-CoV-2 in nasopharyngeal  swabs  Results are for the presumptive identification of SARS-CoV-2 RNA      Positive results are indicative of infection with SARS-CoV-2, the virus  causing COVID-19, but do not rule out bacterial infection or co-infection  with other viruses  Laboratories within the [place] and its  territories are required to report all positive results to the appropriate  public health authorities  Negative results do not preclude SARS-CoV-2  infection and should not be used as the sole basis for treatment or other  patient management decisions  Negative results must be combined with  clinical observations, patient history, and epidemiological information  This test has not been FDA cleared or approved  This test has been authorized by FDA under an Emergency Use Authorization  (EUA)  This test is only authorized for the duration of time the  declaration that circumstances exist justifying the authorization of the  emergency use of an in vitro diagnostic tests for detection of SARS-CoV-2  virus and/or diagnosis of COVID-19 infection under section 564(b)(1) of  the Act, 21 U  S C  659UPJ-0(Q)(7), unless the authorization is terminated  or revoked sooner  The test has been validated but independent review by FDA  and CLIA is pending  Test performed using [employer] GeneXpert: This RT-PCR assay targets N2,  a region unique to SARS-CoV-2  A conserved region in the E-gene was chosen  for pan-Sarbecovirus detection which includes SARS-CoV-2  According to CMS-2020-01-R, this platform meets the definition of high-throughput technology      HS Troponin 0hr (reflex protocol) [[id number]]  (Normal) Collected: 12/22/22 0206    Lab Status: Final result Specimen: Blood from Arm, Left Updated: 12/22/22 0252     hs TnI 0hr 20 ng/L     Comprehensive metabolic panel [[id number]]  (Abnormal) Collected: 12/22/22 0206    Lab Status: Final result Specimen: Blood from Arm, Left Updated: 12/22/22 0251     Sodium 135 mmol/L      Potassium 3 9 mmol/L      Chloride 103 mmol/L      CO2 22 mmol/L      ANION GAP 10 mmol/L      BUN 17 mg/dL      Creatinine 0 88 mg/dL      Glucose 103 mg/dL      Calcium 8 7 mg/dL Corrected Calcium 9 3 mg/dL      AST 19 U/L      ALT 30 U/L      Alkaline Phosphatase 79 U/L      Total Protein 7 7 g/dL      Albumin 3 2 g/dL      Total Bilirubin 0 46 mg/dL      eGFR 76 ml/min/1 73sq m     Narrative:      [place] guidelines for Chronic Kidney Disease (CKD):   •  Stage 1 with normal or high GFR (GFR > 90 mL/min/1 73 square meters)  •  Stage 2 Mild CKD (GFR = 60-89 mL/min/1 73 square meters)  •  Stage 3A Moderate CKD (GFR = 45-59 mL/min/1 73 square meters)  •  Stage 3B Moderate CKD (GFR = 30-44 mL/min/1 73 square meters)  •  Stage 4 Severe CKD (GFR = 15-29 mL/min/1 73 square meters)  •  Stage 5 End Stage CKD (GFR <15 mL/min/1 73 square meters)  Note: GFR calculation is accurate only with a steady state creatinine    Magnesium [[id number]]  (Abnormal) Collected: 12/22/22 0206    Lab Status: Final result Specimen: Blood from Arm, Left Updated: 12/22/22 0251     Magnesium 1 4 mg/dL     CBC and differential [[id number]]  (Abnormal) Collected: 12/22/22 0206    Lab Status: Final result Specimen: Blood from Arm, Left Updated: 12/22/22 0226     WBC 10 29 Thousand/uL      RBC 3 69 Million/uL      Hemoglobin 8 8 g/dL      Hematocrit 29 1 %      MCV 79 fL      MCH 23 8 pg      MCHC 30 2 g/dL      RDW 17 9 %      MPV 11 2 fL      Platelets 795 Thousands/uL      nRBC 0 /100 WBCs      Neutrophils Relative 87 %      Immat GRANS % 0 %      Lymphocytes Relative 7 %      Monocytes Relative 6 %      Eosinophils Relative 0 %      Basophils Relative 0 %      Neutrophils Absolute 8 93 Thousands/µL      Immature Grans Absolute 0 04 Thousand/uL      Lymphocytes Absolute 0 68 Thousands/µL      Monocytes Absolute 0 59 Thousand/µL      Eosinophils Absolute 0 03 Thousand/µL      Basophils Absolute 0 02 Thousands/µL                  No orders to display              Procedures  ECG 12 Lead Documentation Only    Date/Time: 12/22/2022 2:49 AM  Performed by: Felix Elsie DO  Authorized by: Scot Cutler DO     Indications / Diagnosis:  Chest pain  ECG reviewed by me, the ED Provider: yes    Patient location:  ED  Previous ECG:     Previous ECG:  Compared to current    Similarity:  No change  Interpretation:     Interpretation: non-specific    Rate:     ECG rate:  118    ECG rate assessment: tachycardic    Rhythm:     Rhythm: sinus rhythm and sinus tachycardia    Ectopy:     Ectopy: none    QRS:     QRS intervals:  Normal  Conduction:     Conduction: normal    ST segments:     ST segments:  Normal  T waves:     T waves: normal      ECG 12 Lead Documentation Only    Date/Time: 12/22/2022 4:09 AM  Performed by: Scot Cutler DO  Authorized by: Scot Cutler DO     Indications / Diagnosis:  Delta  ECG reviewed by me, the ED Provider: yes    Patient location:  ED  Previous ECG:     Previous ECG:  Compared to current    Similarity:  No change  Interpretation:     Interpretation: non-specific    Rate:     ECG rate:  119    ECG rate assessment: tachycardic    Rhythm:     Rhythm: sinus rhythm and sinus tachycardia    Ectopy:     Ectopy: none    QRS:     QRS intervals:  Normal  Conduction:     Conduction: normal    ST segments:     ST segments:  Normal  T waves:     T waves: normal               ED Course             HEART Risk Score    Flowsheet Row Most Recent Value   Heart Score Risk Calculator    History 0 Filed at: 12/22/2022 0542   ECG 0 Filed at: 12/22/2022 0542   Age 1 Filed at: 12/22/2022 0542   Risk Factors 1 Filed at: 12/22/2022 0542   Troponin 1 Filed at: 12/22/2022 0542   HEART Score 3 Filed at: 12/22/2022 0542                        SBIRT 20yo+    Flowsheet Row Most Recent Value   SBIRT (25 yo +)    In order to provide better care to our patients, we are screening all of our patients for alcohol and drug use  Would it be okay to ask you these screening questions?  No Filed at: 12/22/2022 0143                    MDM  Number of Diagnoses or Management Options  Chronic anemia: new and requires workup  Dehydration: new and requires workup  Diarrhea: new and requires workup  Nausea: new and requires workup  Diagnosis management comments: Patient overall appears well on exam   Does have a mild tachycardia and a temperature of 100 3  Abdomen is soft and nontender  Does have some cramping and feeling of bloating but does not appear distended  Patient is concerned for dehydration due to her multiple bouts of diarrhea  I will hydrate her with IV fluids, use Zofran as needed for nausea, obtain a cardiac work-up due to her chest discomfort associated with near syncope tonight with evaluation of her CBC and electrolytes for possible underlying metabolic dysfunction  3:18 AM  Patient is anemic but has had trending down hemoglobins over the last year  Last blood draw at [facility] she was around 9  Troponin is 20 so patient will need a delta troponin  EKG is nonischemic  Patient is negative for COVID, influenza and RSV  Electrolytes are normal with the exception of a mild low hypomagnesia  5:41 AM  Patient reevaluated  Delta troponin is negative  Heart score is 3  Discussed with her about her anemia  She states that she has been diagnosed with iron deficiency anemia and is not on replacement  I advised her to follow-up with hematology about this  Her resting heart rate is 97 and when she is talking it goes up to about 110  Patient overall looks and feels better but does still have a headache and feels weak  Talk to the patient about admission versus discharge  Patient really wants to try to go home but feels weak at this time  She also tells me that she has a brand-new puppy at home that is positive for Giardia and is on his second round of medications  This could be causing the patient's diarrhea    I explained that what I would do now is to keep her here in the emergency department for a few more hours, continue to orally hydrate and then obtain stool samples to further evaluate this for potential treatment  If patient continues to be unwell then will admit for further hydration under internal medicine  If patient does well then will discharge  Patient understands and agrees with this plan of care  6:57 AM  Patient still symptomatic despite treatment so will admit for observation under internal medicine  Amount and/or Complexity of Data Reviewed  Clinical lab tests: ordered and reviewed  Tests in the medicine section of CPT®: reviewed and ordered  Review and summarize past medical records: yes        Disposition  Final diagnoses:   Diarrhea   Nausea   Dehydration   Chronic anemia     Time reflects when diagnosis was documented in both MDM as applicable and the Disposition within this note     Time User Action Codes Description Comment    12/22/2022  5:48 AM Rivera Search Add [R19 7] Diarrhea     12/22/2022  5:48 AM Smeriglio, Flerudy Pluck Add [R11 0] Nausea     12/22/2022  5:48 AM Rivera Search Add [E86 0] Dehydration     12/22/2022  5:49 AM Rivera Search Add [D64 9] Chronic anemia       ED Disposition     ED Disposition   Admit    Condition   Stable    Date/Time   Thu Dec 22, 2022  6:48 AM    Comment   Case was discussed with TONY and the patient's admission status was agreed to be Admission Status: observation status to the service of Dr Kameron Mendoza              Follow-up Information     Follow up With Specialties Details Why Contact Info Additional Virgen Hare [facility] Call today To schedule an appointment as soon as you can John Salgado [phone] Sally Zacarias [facility], [address], [place], [address], [phone]    Rj Liu MD Internal Medicine Call  If symptoms persist, To schedule an appointment as soon as you can [address] [place] [address] [address] Emergency Department Emergency Medicine Go to  If symptoms worsen Mary [id number]  [address] [facility] Emergency Department, [address] , [place], [place], [address]          Patient's Medications   Discharge Prescriptions    No medications on file       No discharge procedures on file      PDMP Review     None          ED Provider  Electronically Signed by           Constantin Marquez DO  12/22/22 6890

## 2022-12-22 NOTE — ASSESSMENT & PLAN NOTE
· Patient presented after multiple episodes of diarrhea leading to dehydration and presyncope  · Likely secondary to giardia vs bacterial gastroenteritis vs viral gastroenteritis   · Stool PRC ordered, giardia ordered, c diff pcr ordered, ova and parasite ordered  · Covid/ flu RSV negative   · Continue IVF  · Diet as tolerated

## 2022-12-22 NOTE — H&P
Angely 1970, 50 y o  female MRN: [id number]  Unit/Bed#: ED 16 Encounter: [id number]  Primary Care Provider: Hulon Skiff, MD   Date and time admitted to hospital: 12/22/2022  1:23 AM    * Diarrhea  Assessment & Plan  · Patient presented after multiple episodes of diarrhea leading to dehydration and presyncope  · Likely secondary to giardia vs bacterial gastroenteritis vs viral gastroenteritis   · Stool PRC ordered, giardia ordered, c diff pcr ordered, ova and parasite ordered  · Covid/ flu RSV negative   · Continue IVF  · Diet as tolerated    SIRS (systemic inflammatory response syndrome) ([facility] [address] )  Assessment & Plan  · Evidenced by fever, tachycardia  · Tachycardia likely secondary to dehydration  · Fever likely secondary to viral/ bacterial gastroenteritis   · Blood cultures   · UA    Anemia  Assessment & Plan  · hemoglobin of 8 8  · History of iron deficiency anemia  · Iron Panel pending  · Repeat CBC in a m  Primary hypertension  Assessment & Plan  · Continue metoprolol 25 mg daily  · Patient prescribed losartan 25 mg daily outpatient, patient reports she never started  · Follows with [facility] cardiology outpatient    Factor V Leiden mutation [facility])  Assessment & Plan  · DVT prophylaxis      VTE Pharmacologic Prophylaxis: VTE Score: 3 Moderate Risk (Score 3-4) - Pharmacological DVT Prophylaxis Ordered: enoxaparin (Lovenox)  Code Status: Level 1 - Full Code   Discussion with family: Updated  () at bedside  Anticipated Length of Stay: Patient will be admitted on an observation basis with an anticipated length of stay of less than 2 midnights secondary to dehydration due to diarrhea  Total Time for Visit, including Counseling / Coordination of Care: 60 minutes Greater than 50% of this total time spent on direct patient counseling and coordination of care      Chief Complaint: diarrhea    History of Present Illness:  Nuvia Hare Margarita Goodrich is a 50 y o  female with a PMH of factor V leiden, hypertension who presents after 25 episodes of diarrhea starting past evening  Patient again feeling palpitations, lightheadedness, abdominal cramping, dizziness, fever, chills  She presented to the ED after reported presyncopal episode witnessed by  with no loss of consciousness  She states that symptoms began after eating chicken for dinner yesterday  Reports that family dog was recently treated for Giardia  Denies any other sick contacts  After presentation to ED patient reports 8 additional episodes of diarrhea  She has not been able to tolerate much po intake  Review of Systems:  Review of Systems   Constitutional: Positive for appetite change, chills, diaphoresis and fever  HENT: Negative for sinus pressure, sinus pain, sneezing, sore throat and tinnitus  Respiratory: Negative for cough and shortness of breath  Cardiovascular: Negative for chest pain  Gastrointestinal: Positive for abdominal pain, diarrhea and nausea  Negative for constipation and vomiting  Genitourinary: Positive for difficulty urinating and dysuria  Neurological: Positive for light-headedness and headaches  Past Medical and Surgical History:   Past Medical History:   Diagnosis Date   • Bulging lumbar disc     L1-L2   • Endometrial hyperplasia     endometrial polyp   • Factor V deficiency ([facility] [address] ) 2005   • [facility]iden [facility] )    • GERD (gastroesophageal reflux disease)    • RSD lower limb        Past Surgical History:   Procedure Laterality Date   •  SECTION     • EGD  2020    Dr Carmel Elise  Symptoms of reflux after antibiotics  Biopsies negative for H  pylori, negative eosinophilic esophagitis, positive for reflux esophagitis     • FOOT SURGERY     • MOUTH SURGERY     • [place] EXC TUMOR SOFT TISS FACE&SCALP SUBFASCIAL < 2CM N/A 2017    Procedure: EXCISION FOREHEAD MASS;  Surgeon: Gerardo Lackey MD;  Location: [facility]; Service: Plastics   • TUBAL LIGATION         Meds/Allergies:  Prior to Admission medications    Medication Sig Start Date End Date Taking? Authorizing Provider   famotidine (PEPCID) 40 MG tablet TAKE 1 TABLET BY MOUTH TWICE A DAY 6/28/21  Yes Kristofer Calderon IV, MD   metoprolol succinate (TOPROL-XL) 25 mg 24 hr tablet Take 25 mg by mouth daily   Yes Historical Provider, MD   sertraline (ZOLOFT) 50 mg tablet  10/29/21  Yes Historical Provider, MD   acyclovir (ZOVIRAX) 5 % cream  11/19/21   Historical Provider, MD   albuterol (PROVENTIL HFA,VENTOLIN HFA) 90 mcg/act inhaler Inhale 2 puffs every 4 (four) hours as needed for wheezing  Patient not taking: Reported on 12/1/2022 11/20/22   Fred Das DO   ALPRAZolam Noe Sravanthi) 0 25 mg tablet Take 1 tablet (0 25 mg total) by mouth daily as needed for anxiety  Patient not taking: Reported on 12/10/2021 7/13/20   Dominic Mendez DO   Ascorbic Acid 100 MG CHEW Chew 1,500 mg daily  Patient not taking: Reported on 12/1/2022    Historical Provider, MD   azithromycin (ZITHROMAX) 250 mg tablet TAKE 2 TABLETS BY MOUTH TODAY, THEN TAKE 1 TABLET DAILY FOR 4 DAYS  Patient not taking: Reported on 12/1/2022 10/9/22   Historical Provider, MD   Cholecalciferol (VITAMIN D-3 PO) Take by mouth  Patient not taking: Reported on 12/1/2022    Historical Provider, MD   clindamycin (CLEOCIN T) 1 % lotion APPLY 1 TO 2 TIMES A DAY TO AFFECTED AREAS ON BACK/FACE  Patient not taking: Reported on 12/1/2022 11/21/18   Historical Provider, MD   Coenzyme Q10 (COQ-10 PO) Take by mouth  Patient not taking: Reported on 3/15/2022    Historical Provider, MD   cyclopentolate (CYCLOGYL) 1 % ophthalmic solution PUT 1 DROP INTO RIGHT EYE 2X A DAY FOR 30 DAYS   [address] YOUR NEXT VISIT  Patient not taking: Reported on 12/1/2022 9/21/22   Historical Provider, MD   cycloSPORINE (RESTASIS) 0 05 % ophthalmic emulsion Administer 1 drop to both eyes every 12 (twelve) hours 11/18/22   Historical Provider, MD   doxycycline (PERIOSTAT) 20 MG tablet Take 20 mg by mouth 2 (two) times a day with meals  Patient not taking: Reported on 12/1/2022 9/30/22   Historical Provider, MD   fluticasone (FLONASE) 50 mcg/act nasal spray 1 spray into each nostril daily  Patient not taking: Reported on 12/1/2022 11/20/22   Ash Das DO   losartan (COZAAR) 25 mg tablet Take 1 tablet (25 mg total) by mouth daily  Patient not taking: Reported on 4/9/2022 7/14/20   Gerard He DO   losartan (COZAAR) 25 mg tablet Take 1 tablet (25 mg total) by mouth daily  Patient not taking: Reported on 12/22/2022 12/2/22   Donnell Torres MD   neomycin-polymyxin-dexamethasone (MAXITROL) ophthalmic suspension PUT 1 DROP INTO RIGHT EYE 3 TIMES A DAY WITHOUT CONTACT  Patient not taking: Reported on 12/1/2022 8/22/22   Historical Provider, MD   nitrofurantoin (MACROBID) 100 mg capsule TAKE 1 CAPSULE BY MOUTH TWICE A DAY FOR 7 DAYS  Patient not taking: Reported on 12/1/2022 9/30/22   Historical Provider, MD   phenazopyridine (PYRIDIUM) 200 mg tablet TAKE 1 TABLET (200 MG TOTAL) BY MOUTH 3 (THREE) TIMES A DAY AS NEEDED (URINARY DISCOMFORT)    Patient not taking: Reported on 12/1/2022 9/30/22   Historical Provider, MD   prednisoLONE acetate (PRED FORTE) 1 % ophthalmic suspension PUT 1 DROP INTO RIGHT EYE 4 TIMES A DAY FOR 7 DAYS CONTINUE THIS MEDICATION UNTIL NEXT VISIT  Patient not taking: Reported on 12/1/2022 9/21/22   Historical Provider, MD   sodium picosulfate, magnesium oxide, citric acid (Clenpiq) 10-3 5-12 MG-GM -GM/160ML SOLN Take 1 kit by mouth see administration instructions  Patient not taking: Reported on 12/22/2022 12/1/22   TIKA Irvin   Tazorac 0 1 % cream APPLY TOPICALLY TO AFFECTED AREAS AS DIRECTED BRAND NECESSARY  Patient not taking: Reported on 12/1/2022 8/12/22   Historical Provider, MD   tobramycin-dexamethasone (TOBRADEX) ophthalmic suspension PUT 1 DROP INTO LEFT EYE 3 TIMES A DAY FOR 2 DAYS  Patient not taking: Reported on 2022   Historical Provider, MD   tretinoin (REFISSA) 0 05 % cream APPLY TOPICALLY TO AFFECTED AREAS AT NIGHT AS DIRECTED  Patient not taking: Reported on 2022   Historical Provider, MD     I have reviewed home medications with patient personally  Allergies: Allergies   Allergen Reactions   • Pantoprazole Hives and Anaphylaxis   • Diflucan [Fluconazole]    • Erythromycin GI Intolerance   • Tetracycline Vomiting   • Cefpodoxime Rash       Social History:  Marital Status: /Civil Union   Patient Pre-hospital Living Situation: Home  Patient Pre-hospital Level of Mobility: walks  Patient Pre-hospital Diet Restrictions: none  Substance Use History:   Social History     Substance and Sexual Activity   Alcohol Use Not Currently    Comment: rarely     Social History     Tobacco Use   Smoking Status Former   • Years: 3 00   • Types: Cigarettes   • Quit date:    • Years since quittin 9   Smokeless Tobacco Never   Tobacco Comments    "weekends"     Social History     Substance and Sexual Activity   Drug Use No       Family History:  Family History   Problem Relation Age of Onset   • Colon polyps Mother    • Thyroid cancer Brother    • Breast cancer Maternal Grandmother    • Breast cancer Maternal Aunt 76        mothers twin sister   • Breast cancer Other 39        maternal       Physical Exam:     Vitals:   Blood Pressure: 138/66 (22 0854)  Pulse: (!) 120 (22 0854)  Temperature: 100 4 °F (38 °C) (22)  Temp Source: Oral (22)  Respirations: 16 (22 08)  Height: 5' 7" (170 2 cm) (22 0127)  SpO2: 99 % (22)    Physical Exam  Vitals and nursing note reviewed  Constitutional:       Appearance: Normal appearance  She is ill-appearing  HENT:      Head: Normocephalic and atraumatic  Eyes:      General: No scleral icterus  Cardiovascular:      Rate and Rhythm: Tachycardia present     Pulmonary:      Effort: Pulmonary effort is normal       Breath sounds: Normal breath sounds  No wheezing  Abdominal:      General: Abdomen is flat  Bowel sounds are normal       Palpations: Abdomen is soft  Tenderness: There is no abdominal tenderness  Musculoskeletal:      Right lower leg: No edema  Left lower leg: No edema  Skin:     General: Skin is warm and dry  Neurological:      Mental Status: She is alert  Mental status is at baseline  Psychiatric:         Behavior: Behavior normal           Additional Data:     Lab Results:  Results from last 7 days   Lab Units 12/22/22  0206   WBC Thousand/uL 10 29*   HEMOGLOBIN g/dL 8 8*   HEMATOCRIT % 29 1*   PLATELETS Thousands/uL 290   NEUTROS PCT % 87*   LYMPHS PCT % 7*   MONOS PCT % 6   EOS PCT % 0     Results from last 7 days   Lab Units 12/22/22  0206   SODIUM mmol/L 135   POTASSIUM mmol/L 3 9   CHLORIDE mmol/L 103   CO2 mmol/L 22   BUN mg/dL 17   CREATININE mg/dL 0 88   ANION GAP mmol/L 10   CALCIUM mg/dL 8 7   ALBUMIN g/dL 3 2*   TOTAL BILIRUBIN mg/dL 0 46   ALK PHOS U/L 79   ALT U/L 30   AST U/L 19   GLUCOSE RANDOM mg/dL 103                       Lines/Drains:  Invasive Devices     Peripheral Intravenous Line  Duration           Peripheral IV 12/22/22 Left Antecubital <1 day                    Imaging: No pertinent imaging reviewed  No orders to display       EKG and Other Studies Reviewed on Admission:   · EKG: Sinus Tachycardia    ** Please Note: This note has been constructed using a voice recognition system   **

## 2022-12-22 NOTE — ASSESSMENT & PLAN NOTE
· Continue metoprolol 25 mg daily  · Patient prescribed losartan 25 mg daily outpatient, patient reports she never started  · [facility]s with [facility]

## 2022-12-22 NOTE — ASSESSMENT & PLAN NOTE
· hemoglobin of 8 8  · History of iron deficiency anemia  · Iron Panel pending  · Repeat CBC in a m

## 2022-12-23 PROBLEM — A41.9 SEPSIS (HCC): Status: ACTIVE | Noted: 2022-12-22

## 2022-12-23 PROBLEM — A09 DIARRHEA OF INFECTIOUS ORIGIN: Status: ACTIVE | Noted: 2022-12-22

## 2022-12-23 LAB
ANION GAP SERPL CALCULATED.3IONS-SCNC: 11 MMOL/L (ref 4–13)
BILIRUB UR QL STRIP: NEGATIVE
BUN SERPL-MCNC: 8 MG/DL (ref 5–25)
C DIFF TOX GENS STL QL NAA+PROBE: NEGATIVE
CALCIUM SERPL-MCNC: 8.2 MG/DL (ref 8.3–10.1)
CAMPYLOBACTER DNA SPEC NAA+PROBE: DETECTED
CHLORIDE SERPL-SCNC: 105 MMOL/L (ref 96–108)
CLARITY UR: CLEAR
CO2 SERPL-SCNC: 22 MMOL/L (ref 21–32)
COLOR UR: YELLOW
CREAT SERPL-MCNC: 0.89 MG/DL (ref 0.6–1.3)
ERYTHROCYTE [DISTWIDTH] IN BLOOD BY AUTOMATED COUNT: 18.4 % (ref 11.6–15.1)
GFR SERPL CREATININE-BSD FRML MDRD: 75 ML/MIN/1.73SQ M
GLUCOSE P FAST SERPL-MCNC: 112 MG/DL (ref 65–99)
GLUCOSE SERPL-MCNC: 112 MG/DL (ref 65–140)
GLUCOSE UR STRIP-MCNC: NEGATIVE MG/DL
HCT VFR BLD AUTO: 26.6 % (ref 34.8–46.1)
HGB BLD-MCNC: 8.2 G/DL (ref 11.5–15.4)
HGB UR QL STRIP.AUTO: NEGATIVE
KETONES UR STRIP-MCNC: NEGATIVE MG/DL
LEUKOCYTE ESTERASE UR QL STRIP: NEGATIVE
MCH RBC QN AUTO: 24.4 PG (ref 26.8–34.3)
MCHC RBC AUTO-ENTMCNC: 30.8 G/DL (ref 31.4–37.4)
MCV RBC AUTO: 79 FL (ref 82–98)
NITRITE UR QL STRIP: NEGATIVE
PH UR STRIP.AUTO: 6 [PH]
PLATELET # BLD AUTO: 260 THOUSANDS/UL (ref 149–390)
PMV BLD AUTO: 11.1 FL (ref 8.9–12.7)
POTASSIUM SERPL-SCNC: 2.9 MMOL/L (ref 3.5–5.3)
PROCALCITONIN SERPL-MCNC: 0.06 NG/ML
PROT UR STRIP-MCNC: NEGATIVE MG/DL
RBC # BLD AUTO: 3.36 MILLION/UL (ref 3.81–5.12)
SALMONELLA DNA SPEC QL NAA+PROBE: ABNORMAL
SHIGA TOXIN STX GENE SPEC NAA+PROBE: ABNORMAL
SHIGELLA DNA SPEC QL NAA+PROBE: ABNORMAL
SODIUM SERPL-SCNC: 138 MMOL/L (ref 135–147)
SP GR UR STRIP.AUTO: 1.01 (ref 1–1.03)
UROBILINOGEN UR QL STRIP.AUTO: 0.2 E.U./DL
WBC # BLD AUTO: 8.24 THOUSAND/UL (ref 4.31–10.16)

## 2022-12-23 RX ORDER — AZITHROMYCIN 250 MG/1
500 TABLET, FILM COATED ORAL EVERY 24 HOURS
Status: DISCONTINUED | OUTPATIENT
Start: 2022-12-23 | End: 2022-12-24 | Stop reason: HOSPADM

## 2022-12-23 RX ORDER — MAGNESIUM HYDROXIDE/ALUMINUM HYDROXICE/SIMETHICONE 120; 1200; 1200 MG/30ML; MG/30ML; MG/30ML
30 SUSPENSION ORAL EVERY 4 HOURS PRN
Status: DISCONTINUED | OUTPATIENT
Start: 2022-12-23 | End: 2022-12-24 | Stop reason: HOSPADM

## 2022-12-23 RX ORDER — KETOROLAC TROMETHAMINE 30 MG/ML
15 INJECTION, SOLUTION INTRAMUSCULAR; INTRAVENOUS EVERY 6 HOURS PRN
Status: DISCONTINUED | OUTPATIENT
Start: 2022-12-23 | End: 2022-12-24 | Stop reason: HOSPADM

## 2022-12-23 RX ORDER — DICYCLOMINE HYDROCHLORIDE 10 MG/1
10 CAPSULE ORAL 4 TIMES DAILY PRN
Status: DISCONTINUED | OUTPATIENT
Start: 2022-12-23 | End: 2022-12-24 | Stop reason: HOSPADM

## 2022-12-23 RX ORDER — POTASSIUM CHLORIDE 14.9 MG/ML
20 INJECTION INTRAVENOUS
Status: COMPLETED | OUTPATIENT
Start: 2022-12-23 | End: 2022-12-23

## 2022-12-23 RX ORDER — ACETAMINOPHEN 325 MG/1
650 TABLET ORAL EVERY 6 HOURS PRN
Status: DISCONTINUED | OUTPATIENT
Start: 2022-12-23 | End: 2022-12-24 | Stop reason: HOSPADM

## 2022-12-23 RX ORDER — FAMOTIDINE 20 MG/1
40 TABLET, FILM COATED ORAL 2 TIMES DAILY
Status: DISCONTINUED | OUTPATIENT
Start: 2022-12-23 | End: 2022-12-24 | Stop reason: HOSPADM

## 2022-12-23 RX ORDER — SODIUM CHLORIDE AND POTASSIUM CHLORIDE 150; 900 MG/100ML; MG/100ML
100 INJECTION, SOLUTION INTRAVENOUS CONTINUOUS
Status: DISCONTINUED | OUTPATIENT
Start: 2022-12-23 | End: 2022-12-24

## 2022-12-23 RX ADMIN — SODIUM CHLORIDE 3 G: 9 INJECTION, SOLUTION INTRAVENOUS at 01:10

## 2022-12-23 RX ADMIN — POTASSIUM CHLORIDE 20 MEQ: 14.9 INJECTION, SOLUTION INTRAVENOUS at 10:10

## 2022-12-23 RX ADMIN — DICYCLOMINE HYDROCHLORIDE 10 MG: 10 CAPSULE ORAL at 16:25

## 2022-12-23 RX ADMIN — ACETAMINOPHEN 650 MG: 325 TABLET, FILM COATED ORAL at 16:25

## 2022-12-23 RX ADMIN — SERTRALINE HYDROCHLORIDE 50 MG: 50 TABLET ORAL at 08:02

## 2022-12-23 RX ADMIN — DICYCLOMINE HYDROCHLORIDE 10 MG: 10 CAPSULE ORAL at 21:19

## 2022-12-23 RX ADMIN — KETOROLAC TROMETHAMINE 15 MG: 30 INJECTION, SOLUTION INTRAMUSCULAR at 12:05

## 2022-12-23 RX ADMIN — METOPROLOL SUCCINATE 25 MG: 25 TABLET, EXTENDED RELEASE ORAL at 08:02

## 2022-12-23 RX ADMIN — AZITHROMYCIN MONOHYDRATE 500 MG: 250 TABLET ORAL at 12:50

## 2022-12-23 RX ADMIN — ONDANSETRON HYDROCHLORIDE 4 MG: 2 SOLUTION INTRAMUSCULAR; INTRAVENOUS at 16:18

## 2022-12-23 RX ADMIN — FAMOTIDINE 40 MG: 20 TABLET, FILM COATED ORAL at 18:32

## 2022-12-23 RX ADMIN — SODIUM CHLORIDE 3 G: 9 INJECTION, SOLUTION INTRAVENOUS at 07:59

## 2022-12-23 RX ADMIN — SODIUM CHLORIDE AND POTASSIUM CHLORIDE 100 ML/HR: .9; .15 SOLUTION INTRAVENOUS at 22:18

## 2022-12-23 RX ADMIN — GLYCERIN, HYPROMELLOSE, POLYETHYLENE GLYCOL 1 DROP: .2; .2; 1 LIQUID OPHTHALMIC at 21:15

## 2022-12-23 RX ADMIN — ENOXAPARIN SODIUM 40 MG: 40 INJECTION SUBCUTANEOUS at 08:02

## 2022-12-23 RX ADMIN — ACETAMINOPHEN 650 MG: 325 TABLET, FILM COATED ORAL at 08:00

## 2022-12-23 RX ADMIN — ALUMINUM HYDROXIDE, MAGNESIUM HYDROXIDE, AND SIMETHICONE 30 ML: 200; 200; 20 SUSPENSION ORAL at 19:53

## 2022-12-23 RX ADMIN — POTASSIUM CHLORIDE 20 MEQ: 14.9 INJECTION, SOLUTION INTRAVENOUS at 07:59

## 2022-12-23 RX ADMIN — ONDANSETRON HYDROCHLORIDE 4 MG: 2 SOLUTION INTRAMUSCULAR; INTRAVENOUS at 08:00

## 2022-12-23 RX ADMIN — SODIUM CHLORIDE AND POTASSIUM CHLORIDE 100 ML/HR: .9; .15 SOLUTION INTRAVENOUS at 12:07

## 2022-12-23 RX ADMIN — ACETAMINOPHEN 650 MG: 325 TABLET, FILM COATED ORAL at 01:11

## 2022-12-23 RX ADMIN — GLYCERIN, HYPROMELLOSE, POLYETHYLENE GLYCOL 1 DROP: .2; .2; 1 LIQUID OPHTHALMIC at 08:02

## 2022-12-23 RX ADMIN — ALUMINUM HYDROXIDE, MAGNESIUM HYDROXIDE, AND SIMETHICONE 30 ML: 200; 200; 20 SUSPENSION ORAL at 12:13

## 2022-12-23 RX ADMIN — ACETAMINOPHEN 650 MG: 325 TABLET, FILM COATED ORAL at 21:15

## 2022-12-23 RX ADMIN — FAMOTIDINE 40 MG: 20 TABLET, FILM COATED ORAL at 08:02

## 2022-12-23 NOTE — PLAN OF CARE
Problem: Potential for Falls  Goal: Patient will remain free of falls  Description: INTERVENTIONS:  - Educate patient/family on patient safety including physical limitations  - Instruct patient to call for assistance with activity   - Consult OT/PT to assist with strengthening/mobility   - Keep Call bell within reach  - Keep bed low and locked with side rails adjusted as appropriate  - Keep care items and personal belongings within reach  - Initiate and maintain comfort rounds  - Make Fall Risk Sign visible to staff  - Apply yellow socks and bracelet for high fall risk patients  - Consider moving patient to room near nurses station  Outcome: Progressing     Problem: PAIN - ADULT  Goal: Verbalizes/displays adequate comfort level or baseline comfort level  Description: Interventions:  - Encourage patient to monitor pain and request assistance  - Assess pain using appropriate pain scale  - Administer analgesics based on type and severity of pain and evaluate response  - Implement non-pharmacological measures as appropriate and evaluate response  - Consider cultural and social influences on pain and pain management  - Notify physician/advanced practitioner if interventions unsuccessful or patient reports new pain  Outcome: Progressing     Problem: INFECTION - ADULT  Goal: Absence or prevention of progression during hospitalization  Description: INTERVENTIONS:  - Assess and monitor for signs and symptoms of infection  - Monitor lab/diagnostic results  - Monitor all insertion sites, i e  indwelling lines, tubes, and drains  - Monitor endotracheal if appropriate and nasal secretions for changes in amount and color  - [place] appropriate cooling/warming therapies per order  - Administer medications as ordered  - Instruct and encourage patient and family to use good hand hygiene technique  - Identify and instruct in appropriate isolation precautions for identified infection/condition  Outcome: Progressing  Goal: Absence of fever/infection during neutropenic period  Description: INTERVENTIONS:  - Monitor WBC    Outcome: Progressing     Problem: SAFETY ADULT  Goal: Patient will remain free of falls  Description: INTERVENTIONS:  - Educate patient/family on patient safety including physical limitations  - Instruct patient to call for assistance with activity   - Consult OT/PT to assist with strengthening/mobility   - Keep Call bell within reach  - Keep bed low and locked with side rails adjusted as appropriate  - Keep care items and personal belongings within reach  - Initiate and maintain comfort rounds  - Make Fall Risk Sign visible to staff  - Apply yellow socks and bracelet for high fall risk patients  - Consider moving patient to room near nurses station  Outcome: Progressing  Goal: Maintain or return to baseline ADL function  Description: INTERVENTIONS:  -  Assess patient's ability to carry out ADLs; assess patient's baseline for ADL function and identify physical deficits which impact ability to perform ADLs (bathing, care of mouth/teeth, toileting, grooming, dressing, etc )  - Assess/evaluate cause of self-care deficits   - Assess range of motion  - Assess patient's mobility; develop plan if impaired  - Assess patient's need for assistive devices and provide as appropriate  - Encourage maximum independence but intervene and supervise when necessary  - Involve family in performance of ADLs  - Assess for home care needs following discharge   - Consider OT consult to assist with ADL evaluation and planning for discharge  - Provide patient education as appropriate  Outcome: Progressing  Goal: Maintains/Returns to pre admission functional level  Description: INTERVENTIONS:  - Perform BMAT or MOVE assessment daily    - Set and communicate daily mobility goal to care team and patient/family/caregiver     - Collaborate with rehabilitation services on mobility goals if consulted  - Out of bed for toileting  - Record patient progress and toleration of activity level   Outcome: Progressing     Problem: DISCHARGE PLANNING  Goal: Discharge to home or other facility with appropriate resources  Description: INTERVENTIONS:  - Identify barriers to discharge w/patient and caregiver  - Arrange for needed discharge resources and transportation as appropriate  - Identify discharge learning needs (meds, wound care, etc )  - Arrange for interpretive services to assist at discharge as needed  - Refer to Case Management Department for coordinating discharge planning if the patient needs post-hospital services based on physician/advanced practitioner order or complex needs related to functional status, cognitive ability, or social support system  Outcome: Progressing     Problem: Knowledge Deficit  Goal: Patient/family/caregiver demonstrates understanding of disease process, treatment plan, medications, and discharge instructions  Description: Complete learning assessment and assess knowledge base    Interventions:  - Provide teaching at level of understanding  - Provide teaching via preferred learning methods  Outcome: Progressing     Problem: GASTROINTESTINAL - ADULT  Goal: Minimal or absence of nausea and/or vomiting  Description: INTERVENTIONS:  - Administer IV fluids if ordered to ensure adequate hydration  - Maintain NPO status until nausea and vomiting are resolved  - Nasogastric tube if ordered  - Administer ordered antiemetic medications as needed  - Provide nonpharmacologic comfort measures as appropriate  - Advance diet as tolerated, if ordered  - Consider nutrition services referral to assist patient with adequate nutrition and appropriate food choices  Outcome: Progressing  Goal: Maintains or returns to baseline bowel function  Description: INTERVENTIONS:  - Assess bowel function  - Encourage oral fluids to ensure adequate hydration  - Administer IV fluids if ordered to ensure adequate hydration  - Administer ordered medications as needed  - Encourage mobilization and activity  - Consider nutritional services referral to assist patient with adequate nutrition and appropriate food choices  Outcome: Progressing  Goal: Maintains adequate nutritional intake  Description: INTERVENTIONS:  - Monitor percentage of each meal consumed  - Identify factors contributing to decreased intake, treat as appropriate  - Assist with meals as needed  - Monitor I&O, weight, and lab values if indicated  - Obtain nutrition services referral as needed  Outcome: Progressing     Problem: METABOLIC, FLUID AND ELECTROLYTES - ADULT  Goal: Electrolytes maintained within normal limits  Description: INTERVENTIONS:  - Monitor labs and assess patient for signs and symptoms of electrolyte imbalances  - Administer electrolyte replacement as ordered  - Monitor response to electrolyte replacements, including repeat lab results as appropriate  - Instruct patient on fluid and nutrition as appropriate  Outcome: Progressing  Goal: Fluid balance maintained  Description: INTERVENTIONS:  - Monitor labs   - Monitor I/O and WT  - Instruct patient on fluid and nutrition as appropriate  - Assess for signs & symptoms of volume excess or deficit  Outcome: Progressing

## 2022-12-23 NOTE — PROGRESS NOTES
[address] [place] [address]  Progress Note - Merlinda Highman 1970, 50 y o  female MRN: [id number]  Unit/Bed#: E5 -01 Encounter: [id number]  Primary Care Provider: Marti Haney MD   Date and time admitted to hospital: 12/22/2022  1:23 AM    * Diarrhea  Assessment & Plan  Patient presented after >25 episodes of diarrhea leading to dehydration and presyncope  · Differential includes bacterial gastroenteritis vs viral gastroenteritis   · Stool PRC ordered, giardia ordered, c diff pcr ordered, ova and parasite ordered  · Calprotectin, Yersinia, fecal leukocytes ordered  · Covid/ flu RSV negative   · Continue IV fluids with normal saline and potassium  · With continued diarrhea, after all stool cultures are obtained we will initiate patient on Imodium given dehydration  · Patient has remained persistently febrile throughout her hospital stay, started on IV Unasyn  · CT scan abdomen pelvis with pancolitis  · Gastroenterology consulted, patient recommendations    Sepsis [facility])  Assessment & Plan  Present on admission evidenced by fever, tachycardia and likely infectious diarrhea  · No evidence of endorgan damage  · Blood cultures obtained and pending  · Stool cultures obtained and pending  · Continue patient on IV Unasyn at this time  · Continue IV fluids    Anemia  Assessment & Plan  Hgb of 8 2 and stable  Suspect also component of hemodilution  · History of iron deficiency anemia  · Iron Panel with JAQUI, will require iron supplementation on discharge  · OP colonoscopy should be scheduled   · Repeat CBC in a m      Primary hypertension  Assessment & Plan  Continue metoprolol 25 mg daily  · Patient prescribed losartan 25 mg daily outpatient, patient reports she never started  · Follows with [facility] cardiology outpatient    Factor V Leiden mutation [facility])  Assessment & Plan  · DVT prophylaxis      VTE Pharmacologic Prophylaxis: VTE Score: 3 Moderate Risk (Score 3-4) - Pharmacological DVT Prophylaxis Ordered: enoxaparin (Lovenox)  Patient Centered Rounds: I performed bedside rounds with nursing staff today  Discussions with Specialists or Other Care Team Provider: Gastroenterology    Education and Discussions with Family / Patient: Updated  () at bedside  Time Spent for Care: 20 minutes  More than 50% of total time spent on counseling and coordination of care as described above  Current Length of Stay: 0 day(s)  Current Patient Status: Observation   Certification Statement: The patient will continue to require additional inpatient hospital stay due to Continue diarrhea, IV fluid resuscitation, IV antibiotics, stool studies pending  Discharge Plan: Anticipate discharge in 24-48 hrs to home  Code Status: Level 1 - Full Code    Subjective:   Patient is seen resting comfortably in bed  She states that she has had persistent diarrhea all night, upwards of 10 episodes  She denies any lightheadedness or dizziness however reports fevers and chills overnight  Denies any abdominal pain however she does report some mild cramping  She also reports epigastric discomfort which occurs when she takes antibiotics as it exacerbates her reflux  Objective:     Vitals:   Temp (24hrs), Av 7 °F (38 2 °C), Min:98 5 °F (36 9 °C), Max:102 3 °F (39 1 °C)    Temp:  [98 5 °F (36 9 °C)-102 3 °F (39 1 °C)] 100 7 °F (38 2 °C)  HR:  [] 109  Resp:  [14-19] 18  BP: ()/(64-81) 139/74  SpO2:  [95 %-100 %] 98 %  Body mass index is 32 9 kg/m²  Input and Output Summary (last 24 hours):   No intake or output data in the 24 hours ending 22 0954    Physical Exam:   Physical Exam  Vitals and nursing note reviewed  Constitutional:       General: She is not in acute distress  Appearance: Normal appearance  She is not ill-appearing, toxic-appearing or diaphoretic  HENT:      Head: Normocephalic and atraumatic  Cardiovascular:      Rate and Rhythm: Regular rhythm   Tachycardia present  Heart sounds: No murmur heard  No friction rub  No gallop  Pulmonary:      Effort: Pulmonary effort is normal  No respiratory distress  Breath sounds: Normal breath sounds  No wheezing, rhonchi or rales  Abdominal:      General: Abdomen is flat  Bowel sounds are normal  There is no distension  Palpations: Abdomen is soft  There is no mass  Tenderness: There is no abdominal tenderness  There is no right CVA tenderness, left CVA tenderness, guarding or rebound  Hernia: No hernia is present  Musculoskeletal:      Right lower leg: No edema  Left lower leg: No edema  Skin:     General: Skin is warm and dry  Coloration: Skin is not jaundiced or pale  Neurological:      General: No focal deficit present  Mental Status: She is alert  Mental status is at baseline  Additional Data:     Labs:  Results from last 7 days   Lab Units 12/23/22  0427 12/22/22  1104 12/22/22  0206   WBC Thousand/uL 8 24  --  10 29*   HEMOGLOBIN g/dL 8 2*  --  8 8*   HEMATOCRIT % 26 6*  --  29 1*   PLATELETS Thousands/uL 260   < > 290   NEUTROS PCT %  --   --  87*   LYMPHS PCT %  --   --  7*   MONOS PCT %  --   --  6   EOS PCT %  --   --  0    < > = values in this interval not displayed       Results from last 7 days   Lab Units 12/23/22  0427 12/22/22  0206   SODIUM mmol/L 138 135   POTASSIUM mmol/L 2 9* 3 9   CHLORIDE mmol/L 105 103   CO2 mmol/L 22 22   BUN mg/dL 8 17   CREATININE mg/dL 0 89 0 88   ANION GAP mmol/L 11 10   CALCIUM mg/dL 8 2* 8 7   ALBUMIN g/dL  --  3 2*   TOTAL BILIRUBIN mg/dL  --  0 46   ALK PHOS U/L  --  79   ALT U/L  --  30   AST U/L  --  19   GLUCOSE RANDOM mg/dL 112 103                 Results from last 7 days   Lab Units 12/23/22  0427 12/22/22  1805 12/22/22  0206   LACTIC ACID mmol/L  --  1 2  --    PROCALCITONIN ng/ml 0 06  --  <0 05       Lines/Drains:  Invasive Devices     Peripheral Intravenous Line  Duration           Peripheral IV 12/22/22 Right;Ventral (anterior) Forearm <1 day    Peripheral IV 12/23/22 Left;Ventral (anterior) Forearm <1 day                      Imaging: Reviewed radiology reports from this admission including: abdominal/pelvic CT    Recent Cultures (last 7 days):   Results from last 7 days   Lab Units 12/22/22  1112 12/22/22  1103   BLOOD CULTURE  Received in Microbiology Lab  Culture in Progress  Received in Microbiology Lab  Culture in Progress  Last 24 Hours Medication List:   Current Facility-Administered Medications   Medication Dose Route Frequency Provider Last Rate   • acetaminophen  650 mg Oral Q6H PRN Frankie Francis PA-C     • ampicillin-sulbactam  3 g Intravenous Q6H Sydni Diaz DO 3 g (12/23/22 0759)   • calcium carbonate  1,000 mg Oral Daily PRN Frankie Francis PA-C     • enoxaparin  40 mg Subcutaneous Daily Neda Núñez PA-C     • famotidine  40 mg Oral BID Kassy Blake PA-C     • glycerin-hypromellose-  1 drop Both Eyes Q12H [address] PA-C     • metoprolol succinate  25 mg Oral Daily Frankie Francis PA-C     • ondansetron  4 mg Intravenous Q6H PRN Frankie Francis PA-C     • potassium chloride  20 mEq Intravenous [facility] (12/23/22 0759)   • sertraline  50 mg Oral Daily Neda Núñez PA-C     • sodium chloride  125 mL/hr Intravenous Continuous Frankie Francis PA-C 125 mL/hr (12/22/22 1059)   • sodium chloride 0 9 % with KCl 20 mEq/L  100 mL/hr Intravenous Continuous Kassy Blake PA-C          Today, Patient Was Seen By: Kassy Blake PA-C    **Please Note: This note may have been constructed using a voice recognition system  **

## 2022-12-23 NOTE — ASSESSMENT & PLAN NOTE
Present on admission evidenced by fever, tachycardia and likely infectious diarrhea  · No evidence of endorgan damage  · Blood cultures obtained and pending  · Stool cultures obtained and pending  · Continue patient on IV Unasyn at this time  · Continue IV fluids

## 2022-12-23 NOTE — ASSESSMENT & PLAN NOTE
Continue metoprolol 25 mg daily  · Patient prescribed losartan 25 mg daily outpatient, patient reports she never started  · Follows with [facility] cardiology outpatient

## 2022-12-23 NOTE — UTILIZATION REVIEW
Initial Clinical Review    Admission: Date/Time/Statement:   Admission Orders (From admission, onward)     Ordered        12/22/22 0657  Place in Observation  Once                      Orders Placed This Encounter   Procedures   • Place in Observation     Standing Status:   Standing     Number of Occurrences:   1     Order Specific Question:   Level of Care     Answer:   Med Surg [16]     ED Arrival Information     Expected   -    Arrival   12/22/2022 01:23    Acuity   Urgent            Means of arrival   Ambulance    Escorted by   [facility]    Admission type   Emergency            Arrival complaint   dehydration           Chief Complaint   Patient presents with   • Diarrhea     Pt came in via EMS, pt reports diarrhea, nausea, headaches, dizziness  Denies other symptoms  Initial Presentation: 50 y o  female with PMH of V leiden, HTN presented to the ED from home via EMS after 25 episodes of diarrhea starting past evening  Pt reported feeling palpitations, lightheadedness, abdominal cramping, dizziness, fever, chills  She had a witnessed presyncopal episode w/ no LOC  Reported that symptoms started after eating chicken for dinner yesterday  Also reported that the family dog was recently treated for Giardia  In the ED, febrile, T 100 3,  likely secondary to viral/ bacterial gastroenteritis  She had 8 additional episodes of diarrhea  Unable to tolerate much of po intake  On exam, alert  MS at baseline, ill appearing, tachycardia present,  Abd flat, soft,bowel sounds normal  Given 1L IVF bolus, IV Famotidine, IV Reglan, Tylenol, Bentyl, IV toradol  Admit as observation level of care for diarhrea, SIRS, anemia:  Stool PRC ordered, giardia ordered, c diff pcr ordered, ova and parasite ordered  Covid/ flu RSV negative   Continue IVF  Diet as tolerated  Blood cultures   UA  Iron Panel pending  Repeat CBC in a m  Juan Dillonsyn d/t persistent fever  CT abdomen pelvis     12/23 GI Consult: Pt identified to have pancolitis secondary to Campylobacter after eating the chicken gyro  Plan: azithromycin plan for Campylobacter   Hay diet for 2 weeks  Continue IV fluids and hydration    ED Triage Vitals   Temperature Pulse Respirations Blood Pressure SpO2   12/22/22 0127 12/22/22 0127 12/22/22 0127 12/22/22 0127 12/22/22 0127   100 3 °F (37 9 °C) (!) 114 20 149/71 100 %      Temp Source Heart Rate Source Patient Position - Orthostatic VS BP Location FiO2 (%)   12/22/22 0127 12/22/22 0127 12/22/22 0127 12/22/22 0127 --   Oral Monitor Lying Right arm       Pain Score       12/22/22 0207       4          Wt Readings from Last 1 Encounters:   12/22/22 96 7 kg (213 lb 3 oz)     Additional Vital Signs:   Date/Time Temp Pulse Resp BP MAP (mmHg) SpO2 O2 Device Patient Position - Orthostatic VS   12/23/22 15:16:01 99 6 °F (37 6 °C) 89 18 113/68 83 97 % None (Room air) Lying   12/23/22 0837 -- -- -- -- -- -- None (Room air) --   12/23/22 07:56:05 100 7 °F (38 2 °C) Abnormal  109 Abnormal  18 139/74 96 98 % None (Room air) Lying   12/23/22 03:38:42 100 °F (37 8 °C) 89 -- -- -- 96 % -- --   12/23/22 01:49:43 100 9 °F (38 3 °C) Abnormal  96 -- -- -- 96 % -- --   12/23/22 01:05:18 101 4 °F (38 6 °C) Abnormal  109 Abnormal  -- -- -- 98 % -- --   12/22/22 2313 101 1 °F (38 4 °C) Abnormal  95 19 142/71 95 -- -- Lying   12/22/22 2001 -- -- -- -- -- 95 % None (Room air) --   12/22/22 19:52:23 99 4 °F (37 4 °C) 100 -- -- -- 96 % -- --   12/22/22 19:18:50 102 3 °F (39 1 °C) Abnormal  108 Abnormal  -- -- -- 96 % -- --   12/22/22 17:25:24 101 1 °F (38 4 °C) Abnormal  102 -- -- -- 100 % -- --   12/22/22 14:42:55 -- 101 -- 121/81 94 97 % -- --   12/22/22 1441 101 °F (38 3 °C) Abnormal  103 16 121/81 -- -- -- --   12/22/22 14:24:49 101 °F (38 3 °C) Abnormal  103 -- -- -- 95 % -- --   12/22/22 14:22:32 -- 103 -- -- -- 97 % -- --   12/22/22 13:25:59 98 5 °F (36 9 °C) 112 Abnormal  14 92/64 73 96 % -- -- 12/22/22 1154 -- 94 -- -- -- -- -- --   12/22/22 0926 100 4 °F (38 °C) -- -- -- -- -- -- --   12/22/22 0854 102 3 °F (39 1 °C) Abnormal  120 Abnormal  16 138/66 -- 99 % None (Room air) Lying   12/22/22 0603 99 7 °F (37 6 °C) 98 18 146/70 -- 100 % None (Room air) Lying   12/22/22 0515 -- 98 16 -- -- -- -- --   12/22/22 0330 -- 114 Abnormal  13 126/56 81 100 % None (Room air) Lying   12/22/22 0141 -- -- -- -- -- -- None (Room air) --       Pertinent Labs/Diagnostic Test Results:   CT abdomen pelvis w contrast   Final Result by Gerry Lares MD (12/22 2311)      Findings compatible with pan colitis  No pneumatosis or free air  The study was marked in [facility] for immediate notification              Workstation performed: [id number]           12/22 EKG result: Sinus tachycardia    Results from last 7 days   Lab Units 12/22/22  0206   SARS-COV-2  Negative     Results from last 7 days   Lab Units 12/23/22  0427 12/22/22  1104 12/22/22  0206   WBC Thousand/uL 8 24  --  10 29*   HEMOGLOBIN g/dL 8 2*  --  8 8*   HEMATOCRIT % 26 6*  --  29 1*   PLATELETS Thousands/uL 260 350 290   NEUTROS ABS Thousands/µL  --   --  8 93*         Results from last 7 days   Lab Units 12/23/22  0427 12/22/22  0206   SODIUM mmol/L 138 135   POTASSIUM mmol/L 2 9* 3 9   CHLORIDE mmol/L 105 103   CO2 mmol/L 22 22   ANION GAP mmol/L 11 10   BUN mg/dL 8 17   CREATININE mg/dL 0 89 0 88   EGFR ml/min/1 73sq m 75 76   CALCIUM mg/dL 8 2* 8 7   MAGNESIUM mg/dL  --  1 4*     Results from last 7 days   Lab Units 12/22/22  0206   AST U/L 19   ALT U/L 30   ALK PHOS U/L 79   TOTAL PROTEIN g/dL 7 7   ALBUMIN g/dL 3 2*   TOTAL BILIRUBIN mg/dL 0 46         Results from last 7 days   Lab Units 12/23/22  0427 12/22/22  0206   GLUCOSE RANDOM mg/dL 112 103           Results from last 7 days   Lab Units 12/22/22  0408 12/22/22  0206   HS TNI 0HR ng/L  --  20   HS TNI 2HR ng/L 17  --    HSTNI D2 ng/L -3  --                  Results from last 7 days   Lab Units 12/23/22  0427 12/22/22  0206   PROCALCITONIN ng/ml 0 06 <0 05     Results from last 7 days   Lab Units 12/22/22  1805   LACTIC ACID mmol/L 1 2                 Results from last 7 days   Lab Units 12/22/22  0206   FERRITIN ng/mL 5*       Results from last 7 days   Lab Units 12/23/22  0456   CLARITY UA  Clear   COLOR UA  Yellow   SPEC GRAV UA  1 010   PH UA  6 0   GLUCOSE UA mg/dl Negative   KETONES UA mg/dl Negative   BLOOD UA  Negative   PROTEIN UA mg/dl Negative   NITRITE UA  Negative   BILIRUBIN UA  Negative   UROBILINOGEN UA E U /dl 0 2   LEUKOCYTES UA  Negative     Results from last 7 days   Lab Units 12/22/22  0206   INFLUENZA A PCR  Negative   INFLUENZA B PCR  Negative   RSV PCR  Negative                 Results from last 7 days   Lab Units 12/22/22  0729   C DIFF TOXIN B BY PCR  Negative     Results from last 7 days   Lab Units 12/22/22  0729   SALMONELLA SP PCR  None Detected   SHIGELLA SP/ENTEROINVASIVE E  COLI (EIEC)  None Detected   CAMPYLOBACTER SP (JEJUNI AND COLI)  Detected*   SHIGA TOXIN 1/SHIGA TOXIN 2  None Detected         Results from last 7 days   Lab Units 12/22/22  1112 12/22/22  1103   BLOOD CULTURE  Received in Microbiology Lab  Culture in Progress  Received in Microbiology Lab  Culture in Progress                 ED Treatment:   Medication Administration from 12/22/2022 0123 to 12/22/2022 1315       Date/Time Order Dose Route Action     12/22/2022 0127 EST ondansetron (FOR EMS ONLY) (ZOFRAN) 4 mg/2 mL injection 4 mg 0 mg Does not apply Given to EMS     12/22/2022 0408 EST sodium chloride 0 9 % bolus 1,000 mL 0 mL Intravenous Stopped     12/22/2022 0215 EST sodium chloride 0 9 % bolus 1,000 mL 1,000 mL Intravenous New Bag     12/22/2022 0207 EST acetaminophen (TYLENOL) tablet 975 mg 975 mg Oral Given     12/22/2022 0207 EST dicyclomine (BENTYL) tablet 20 mg 20 mg Oral Given     12/22/2022 0207 EST Famotidine (PF) (PEPCID) injection 20 mg 20 mg Intravenous Given     12/22/2022 0853 EST ketorolac (TORADOL) injection 15 mg 15 mg Intravenous Given     12/22/2022 8808 EST metoclopramide (REGLAN) injection 10 mg 10 mg Intravenous Given     12/22/2022 1058 EST famotidine (PEPCID) tablet 40 mg 40 mg Oral Given     12/22/2022 1058 EST sertraline (ZOLOFT) tablet 50 mg 50 mg Oral Given     12/22/2022 1058 EST metoprolol succinate (TOPROL-XL) 24 hr tablet 25 mg 25 mg Oral Given     12/22/2022 1059 EST enoxaparin (LOVENOX) subcutaneous injection 40 mg 40 mg Subcutaneous Given     12/22/2022 1257 EST acetaminophen (TYLENOL) tablet 650 mg 650 mg Oral Given     12/22/2022 1059 EST sodium chloride 0 9 % infusion 125 mL/hr Intravenous New Bag        Past Medical History:   Diagnosis Date   • Bulging lumbar disc     L1-L2   • Endometrial hyperplasia     endometrial polyp   • Factor V deficiency ([facility] ) 02/25/2005   • Factor V Leiden ([place] [address] )    • GERD (gastroesophageal reflux disease)    • RSD lower limb      Present on Admission:  • Factor V Leiden mutation ([place] [address] )  • Primary hypertension      Admitting Diagnosis: Diarrhea [R19 7]  Dehydration [E86 0]  Nausea [R11 0]  Chronic anemia [D64 9]  Age/Sex: 50 y o  female  Admission Orders:    Scheduled Medications:  azithromycin, 500 mg, Oral, Q24H  enoxaparin, 40 mg, Subcutaneous, Daily  famotidine, 40 mg, Oral, BID  glycerin-hypromellose-, 1 drop, Both Eyes, Q12H [address]  metoprolol succinate, 25 mg, Oral, Daily  sertraline, 50 mg, Oral, Daily  ampicillin-sulbactam (UNASYN) 3 g in sodium chloride 0 9 % 100 mL IVPB  Dose: 3 g  Freq: Every 6 hours Route: IV  Last Dose: 3 g (12/23/22 0759)  Start: 12/22/22 2000 End: 12/23/22 1220    Continuous IV Infusions:  sodium chloride 0 9 % with KCl 20 mEq/L, 100 mL/hr, Intravenous, Continuous      PRN Meds:  acetaminophen, 650 mg, Oral, Q6H PRN 12/22 x 1, 12/23 x 3  aluminum-magnesium hydroxide-simethicone, 30 mL, Oral, Q4H PRN 12/23 x 1  calcium carbonate, 1,000 mg, Oral, Daily PRN  dicyclomine, 10 mg, Oral, 4x Daily PRN 12/23 x 1  ketorolac, 15 mg, Intravenous, Q6H PRN 2/23 x 1  ondansetron, 4 mg, Intravenous, Q6H PRN 12/23 x 2        IP CONSULT TO GASTROENTEROLOGY    Network Utilization Review Department  ATTENTION: Please call with any questions or concerns to [phone] and carefully listen to the prompts so that you are directed to the right person  All voicemails are confidential   Bharathi Gonzáles all requests for admission clinical reviews, approved or denied determinations and any other requests to dedicated fax number below belonging to the campus where the patient is receiving treatment   List of dedicated fax numbers for the Facilities:  [address] DENIALS (Administrative/Medical Necessity) [phone]   [address] (Maternity/NICU/Pediatrics) [phone]   0 Marylu Mulligan [phone]   D[address] [phone]   [address] [address] Marcelle Theodore 28 [phone]   1552 [facility] Aishwarya Crocker [place] [address] 815 [facility] [phone]

## 2022-12-23 NOTE — ASSESSMENT & PLAN NOTE
Patient presented after >25 episodes of diarrhea leading to dehydration and presyncope  · Differential includes bacterial gastroenteritis vs viral gastroenteritis   · Stool PRC ordered, giardia ordered, c diff pcr ordered, ova and parasite ordered  · Calprotectin, Yersinia, fecal leukocytes ordered  · Covid/ flu RSV negative   · Continue IV fluids with normal saline and potassium  · With continued diarrhea, after all stool cultures are obtained we will initiate patient on Imodium given dehydration  · Patient has remained persistently febrile throughout her hospital stay, started on IV Unasyn  · CT scan abdomen pelvis with pancolitis  · Gastroenterology consulted, patient recommendations

## 2022-12-23 NOTE — CONSULTS
Consultation - [address] [facility]  Georgi June 50 y o  female MRN: [id number]  Unit/Bed#: E5 -01 Encounter: [id number]        Inpatient consult to gastroenterology  Consult performed by: Mikayla Shaffer PA-C  Consult ordered by: Nicola Kendrick PA-C          Reason for Consult / Principal Problem:     Diarrhea    ASSESSMENT AND PLAN:      50year old female with HTN, dyslipidemia, and factor V Leiden presenting with diarrhea, lightheadedness, dizziness, and abdominal cramping  1  Diarrhea  2  Pancolitis  3  GERD  The patient ate chicken shawarma Wednesday for lunch and an hour following this she developed non bloody watery diarrhea 25+ times daily associated with abdominal cramping  She presented to the ER yesterday after feeling as though she was going to pass out  On admit CT revealed pancolitis and she met SIRs criteria with fevers (Tmax 102 3 12/22) and tachycardia  Lactic acid normal and WBC 10 29-8  24  As she had persistent fevers she was started on unasyn and has received 3 doses thus far  She states this is worsening her GERD which is typical when she takes antibiotics  She reports a heavy feeling in her stomach and chest   - possibly a food borne gastroenteritis, will follow stool studies   - continue supportive measures   - continue bentyl as needed  - continue BID pepcid (PPIs cause hives/anaphylaxis)  - add mylanta as needed (carafate causes dizziness)  - as patient has never had a colonoscopy she will require colonoscopy, timing dependent on clinical course, if improves can perform outpatient otherwise will consider inpatient     Addendum: patient was found to have campylobacter  Given severity of symptoms will treat with azithromycin   GI will sign off but arrange outpatient follow up for colorectal cancer screening    ______________________________________________________________________    HPI: 50year old female with HTN, dyslipidemia, and factor V Leiden presenting with diarrhea, lightheadedness, dizziness, and abdominal cramping  This began Wednesday afternoon about 1 hour after ordering out chicken dipti  She has had 25+ bowel movements daily  No blood in her stool  No associated symptoms aside for abodminal cramping and GERD which she states happens anytime she takes antibiotics  CT on admit revealing pancolitis and sepsis criteria was met with fevers and tachycardia  Lactic acid normal  No prior colonoscopy  REVIEW OF SYSTEMS:    CONSTITUTIONAL: Denies any fever, chills, rigors, and weight loss  HEENT: No earache or tinnitus  Denies hearing loss or visual disturbances  CARDIOVASCULAR: No chest pain or palpitations  RESPIRATORY: Denies any cough, hemoptysis, shortness of breath or dyspnea on exertion  GASTROINTESTINAL: As noted in the History of Present Illness  GENITOURINARY: No problems with urination  Denies any hematuria or dysuria  NEUROLOGIC: No dizziness or vertigo, denies headaches  MUSCULOSKELETAL: Denies any muscle or joint pain  SKIN: Denies skin rashes or itching  ENDOCRINE: Denies excessive thirst  Denies intolerance to heat or cold  PSYCHOSOCIAL: Denies depression or anxiety  Denies any recent memory loss  Historical Information   Past Medical History:   Diagnosis Date   • Bulging lumbar disc     L1-L2   • Endometrial hyperplasia     endometrial polyp   • Factor V deficiency ([facility] [address] ) 2005   • Factor V Leiden ([facility] [address] )    • GERD (gastroesophageal reflux disease)    • RSD lower limb      Past Surgical History:   Procedure Laterality Date   •  SECTION     • EGD  2020    Dr Therese Mullins  Symptoms of reflux after antibiotics  Biopsies negative for H  pylori, negative eosinophilic esophagitis, positive for reflux esophagitis     • FOOT SURGERY     • MOUTH SURGERY     • ID EXC TUMOR SOFT TISS FACE&SCALP SUBFASCIAL < 2CM N/A 2017    Procedure: EXCISION FOREHEAD MASS;  Surgeon: Guevara Chase MD;  Location: [facility]; Service: Plastics   • TUBAL LIGATION       Social History   Social History     Substance and Sexual Activity   Alcohol Use Not Currently    Comment: rarely     Social History     Substance and Sexual Activity   Drug Use No     Social History     Tobacco Use   Smoking Status Former   • Years: 3 00   • Types: Cigarettes   • Quit date:    • Years since quittin 9   Smokeless Tobacco Never   Tobacco Comments    "weekends"     Family History   Problem Relation Age of Onset   • Colon polyps Mother    • Thyroid cancer Brother    • Breast cancer Maternal Grandmother    • Breast cancer Maternal Aunt 76        mothers twin sister   • Breast cancer Other 39        maternal       Meds/Allergies     Medications Prior to Admission   Medication   • famotidine (PEPCID) 40 MG tablet   • metoprolol succinate (TOPROL-XL) 25 mg 24 hr tablet   • sertraline (ZOLOFT) 50 mg tablet   • acyclovir (ZOVIRAX) 5 % cream   • albuterol (PROVENTIL HFA,VENTOLIN HFA) 90 mcg/act inhaler   • ALPRAZolam (XANAX) 0 25 mg tablet   • Ascorbic Acid 100 MG CHEW   • azithromycin (ZITHROMAX) 250 mg tablet   • Cholecalciferol (VITAMIN D-3 PO)   • clindamycin (CLEOCIN T) 1 % lotion   • Coenzyme Q10 (COQ-10 PO)   • cyclopentolate (CYCLOGYL) 1 % ophthalmic solution   • cycloSPORINE (RESTASIS) 0 05 % ophthalmic emulsion   • doxycycline (PERIOSTAT) 20 MG tablet   • fluticasone (FLONASE) 50 mcg/act nasal spray   • losartan (COZAAR) 25 mg tablet   • losartan (COZAAR) 25 mg tablet   • neomycin-polymyxin-dexamethasone (MAXITROL) ophthalmic suspension   • nitrofurantoin (MACROBID) 100 mg capsule   • phenazopyridine (PYRIDIUM) 200 mg tablet   • prednisoLONE acetate (PRED FORTE) 1 % ophthalmic suspension   • sodium picosulfate, magnesium oxide, citric acid (Clenpiq) 10-3 5-12 MG-GM -GM/160ML SOLN   • Tazorac 0 1 % cream   • tobramycin-dexamethasone (TOBRADEX) ophthalmic suspension   • tretinoin (REFISSA) 0 05 % cream     Current Facility-Administered Medications Medication Dose Route Frequency   • acetaminophen (TYLENOL) tablet 650 mg  650 mg Oral Q6H PRN   • ampicillin-sulbactam (UNASYN) 3 g in sodium chloride 0 9 % 100 mL IVPB  3 g Intravenous Q6H   • calcium carbonate (TUMS) chewable tablet 1,000 mg  1,000 mg Oral Daily PRN   • enoxaparin (LOVENOX) subcutaneous injection 40 mg  40 mg Subcutaneous Daily   • famotidine (PEPCID) tablet 40 mg  40 mg Oral BID   • glycerin-hypromellose- (ARTIFICIAL TEARS) ophthalmic solution 1 drop  1 drop Both Eyes Q12H ALICE   • metoprolol succinate (TOPROL-XL) 24 hr tablet 25 mg  25 mg Oral Daily   • ondansetron (ZOFRAN) injection 4 mg  4 mg Intravenous Q6H PRN   • potassium chloride 20 mEq IVPB (premix)  20 mEq Intravenous Q2H   • sertraline (ZOLOFT) tablet 50 mg  50 mg Oral Daily   • sodium chloride 0 9 % infusion  125 mL/hr Intravenous Continuous   • sodium chloride 0 9 % with KCl 20 mEq/L infusion (premix)  100 mL/hr Intravenous Continuous       Allergies   Allergen Reactions   • Pantoprazole Hives and Anaphylaxis   • Diflucan [Fluconazole]    • Erythromycin GI Intolerance   • Tetracycline Vomiting   • Cefpodoxime Rash           Objective     Blood pressure 139/74, pulse (!) 109, temperature (!) 100 7 °F (38 2 °C), temperature source Oral, resp  rate 18, height 5' 7 5" (1 715 m), weight 96 7 kg (213 lb 3 oz), SpO2 98 %  Body mass index is 32 9 kg/m²  No intake or output data in the 24 hours ending 12/23/22 0449      PHYSICAL EXAM:      General Appearance:   Alert, cooperative, no distress   HEENT:   Normocephalic, atraumatic, anicteric      Neck:  Supple, symmetrical, trachea midline   Lungs:   Clear to auscultation bilaterally; no rales, rhonchi or wheezing; respirations unlabored    Heart:   Regular rate and rhythm; no murmur, rub, or gallop     Abdomen:   Soft, non-tender, non-distended; normal bowel sounds; no masses, no organomegaly    Genitalia:   Deferred    Rectal:   Deferred    Extremities:  No cyanosis, clubbing or edema    Skin:  No jaundice, rashes, or lesions    Lymph nodes:  No palpable cervical lymphadenopathy        Lab Results:   Admission on 12/22/2022   Component Date Value   • WBC 12/22/2022 10 29 (H)    • RBC 12/22/2022 3 69 (L)    • Hemoglobin 12/22/2022 8 8 (L)    • Hematocrit 12/22/2022 29 1 (L)    • MCV 12/22/2022 79 (L)    • MCH 12/22/2022 23 8 (L)    • MCHC 12/22/2022 30 2 (L)    • RDW 12/22/2022 17 9 (H)    • MPV 12/22/2022 11 2    • Platelets 15/12/3428 290    • nRBC 12/22/2022 0    • Neutrophils Relative 12/22/2022 87 (H)    • Immat GRANS % 12/22/2022 0    • Lymphocytes Relative 12/22/2022 7 (L)    • Monocytes Relative 12/22/2022 6    • Eosinophils Relative 12/22/2022 0    • Basophils Relative 12/22/2022 0    • Neutrophils Absolute 12/22/2022 8 93 (H)    • Immature Grans Absolute 12/22/2022 0 04    • Lymphocytes Absolute 12/22/2022 0 68    • Monocytes Absolute 12/22/2022 0 59    • Eosinophils Absolute 12/22/2022 0 03    • Basophils Absolute 12/22/2022 0 02    • Sodium 12/22/2022 135    • Potassium 12/22/2022 3 9    • Chloride 12/22/2022 103    • CO2 12/22/2022 22    • ANION GAP 12/22/2022 10    • BUN 12/22/2022 17    • Creatinine 12/22/2022 0 88    • Glucose 12/22/2022 103    • Calcium 12/22/2022 8 7    • Corrected Calcium 12/22/2022 9 3    • AST 12/22/2022 19    • ALT 12/22/2022 30    • Alkaline Phosphatase 12/22/2022 79    • Total Protein 12/22/2022 7 7    • Albumin 12/22/2022 3 2 (L)    • Total Bilirubin 12/22/2022 0 46    • eGFR 12/22/2022 76    • hs TnI 0hr 12/22/2022 20    • Magnesium 12/22/2022 1 4 (L)    • SARS-CoV-2 12/22/2022 Negative    • INFLUENZA A PCR 12/22/2022 Negative    • INFLUENZA B PCR 12/22/2022 Negative    • RSV PCR 12/22/2022 Negative    • Ventricular Rate 12/22/2022 118    • Atrial Rate 12/22/2022 118    • [place] Interval 12/22/2022 152    • QRSD Interval 12/22/2022 82    • QT Interval 12/22/2022 310    • QTC Interval 12/22/2022 434    • P Axis 12/22/2022 65    • QRS Axis 12/22/2022 76 • T Wave Axis 12/22/2022 62    • hs TnI 2hr 12/22/2022 17    • Delta 2hr hsTnI 12/22/2022 -3    • Ventricular Rate 12/22/2022 119    • Atrial Rate 12/22/2022 119    • UT Interval 12/22/2022 164    • QRSD Interval 12/22/2022 88    • QT Interval 12/22/2022 326    • QTC Interval 12/22/2022 458    • P Axis 12/22/2022 72    • QRS Axis 12/22/2022 82    • T Wave Axis 12/22/2022 66    • Platelets 14/79/0343 350    • MPV 12/22/2022 11 2    • Blood Culture 12/22/2022 Received in Microbiology Lab  Culture in Progress  • Blood Culture 12/22/2022 Received in Microbiology Lab  Culture in Progress      • Color, UA 12/23/2022 Yellow    • Clarity, UA 12/23/2022 Clear    • Specific Gravity, UA 12/23/2022 1 010    • pH, UA 12/23/2022 6 0    • Leukocytes, UA 12/23/2022 Negative    • Nitrite, UA 12/23/2022 Negative    • Protein, UA 12/23/2022 Negative    • Glucose, UA 12/23/2022 Negative    • Ketones, UA 12/23/2022 Negative    • Urobilinogen, UA 12/23/2022 0 2    • Bilirubin, UA 12/23/2022 Negative    • Occult Blood, UA 12/23/2022 Negative    • Procalcitonin 12/22/2022 <0 05    • Iron Saturation 12/22/2022 6 (L)    • TIBC 12/22/2022 468 (H)    • Iron 12/22/2022 26 (L)    • Ferritin 12/22/2022 5 (L)    • LACTIC ACID 12/22/2022 1 2    • Sodium 12/23/2022 138    • Potassium 12/23/2022 2 9 (L)    • Chloride 12/23/2022 105    • CO2 12/23/2022 22    • ANION GAP 12/23/2022 11    • BUN 12/23/2022 8    • Creatinine 12/23/2022 0 89    • Glucose 12/23/2022 112    • Glucose, Fasting 12/23/2022 112 (H)    • Calcium 12/23/2022 8 2 (L)    • eGFR 12/23/2022 75    • WBC 12/23/2022 8 24    • RBC 12/23/2022 3 36 (L)    • Hemoglobin 12/23/2022 8 2 (L)    • Hematocrit 12/23/2022 26 6 (L)    • MCV 12/23/2022 79 (L)    • MCH 12/23/2022 24 4 (L)    • MCHC 12/23/2022 30 8 (L)    • RDW 12/23/2022 18 4 (H)    • Platelets 27/84/1630 260    • MPV 12/23/2022 11 1    • Procalcitonin 12/23/2022 0 06        Imaging Studies: I have personally reviewed pertinent imaging studies

## 2022-12-23 NOTE — PLAN OF CARE
Problem: Potential for Falls  Goal: Patient will remain free of falls  Description: INTERVENTIONS:  - Educate patient/family on patient safety including physical limitations  - Instruct patient to call for assistance with activity   - Consult OT/PT to assist with strengthening/mobility   - Keep Call bell within reach  - Keep bed low and locked with side rails adjusted as appropriate  - Keep care items and personal belongings within reach  - Initiate and maintain comfort rounds  - Make Fall Risk Sign visible to staff  - Apply yellow socks and bracelet for high fall risk patients  - Consider moving patient to room near nurses station  Outcome: Progressing     Problem: PAIN - ADULT  Goal: Verbalizes/displays adequate comfort level or baseline comfort level  Description: Interventions:  - Encourage patient to monitor pain and request assistance  - Assess pain using appropriate pain scale  - Administer analgesics based on type and severity of pain and evaluate response  - Implement non-pharmacological measures as appropriate and evaluate response  - Consider cultural and social influences on pain and pain management  - Notify physician/advanced practitioner if interventions unsuccessful or patient reports new pain  Outcome: Progressing     Problem: INFECTION - ADULT  Goal: Absence or prevention of progression during hospitalization  Description: INTERVENTIONS:  - Assess and monitor for signs and symptoms of infection  - Monitor lab/diagnostic results  - Monitor all insertion sites, i e  indwelling lines, tubes, and drains  - Monitor endotracheal if appropriate and nasal secretions for changes in amount and color  - [place] appropriate cooling/warming therapies per order  - Administer medications as ordered  - Instruct and encourage patient and family to use good hand hygiene technique  - Identify and instruct in appropriate isolation precautions for identified infection/condition  Outcome: Progressing  Goal: Absence of fever/infection during neutropenic period  Description: INTERVENTIONS:  - Monitor WBC    Outcome: Progressing     Problem: SAFETY ADULT  Goal: Patient will remain free of falls  Description: INTERVENTIONS:  - Educate patient/family on patient safety including physical limitations  - Instruct patient to call for assistance with activity   - Consult OT/PT to assist with strengthening/mobility   - Keep Call bell within reach  - Keep bed low and locked with side rails adjusted as appropriate  - Keep care items and personal belongings within reach  - Initiate and maintain comfort rounds  - Make Fall Risk Sign visible to staff  - Apply yellow socks and bracelet for high fall risk patients  - Consider moving patient to room near nurses station  Outcome: Progressing  Goal: Maintain or return to baseline ADL function  Description: INTERVENTIONS:  -  Assess patient's ability to carry out ADLs; assess patient's baseline for ADL function and identify physical deficits which impact ability to perform ADLs (bathing, care of mouth/teeth, toileting, grooming, dressing, etc )  - Assess/evaluate cause of self-care deficits   - Assess range of motion  - Assess patient's mobility; develop plan if impaired  - Assess patient's need for assistive devices and provide as appropriate  - Encourage maximum independence but intervene and supervise when necessary  - Involve family in performance of ADLs  - Assess for home care needs following discharge   - Consider OT consult to assist with ADL evaluation and planning for discharge  - Provide patient education as appropriate  Outcome: Progressing  Goal: Maintains/Returns to pre admission functional level  Description: INTERVENTIONS:  - Perform BMAT or MOVE assessment daily    - Set and communicate daily mobility goal to care team and patient/family/caregiver     - Collaborate with rehabilitation services on mobility goals if consulted  - Out of bed for toileting  - Record patient progress and toleration of activity level   Outcome: Progressing     Problem: DISCHARGE PLANNING  Goal: Discharge to home or other facility with appropriate resources  Description: INTERVENTIONS:  - Identify barriers to discharge w/patient and caregiver  - Arrange for needed discharge resources and transportation as appropriate  - Identify discharge learning needs (meds, wound care, etc )  - Arrange for interpretive services to assist at discharge as needed  - Refer to Case Management Department for coordinating discharge planning if the patient needs post-hospital services based on physician/advanced practitioner order or complex needs related to functional status, cognitive ability, or social support system  Outcome: Progressing     Problem: Knowledge Deficit  Goal: Patient/family/caregiver demonstrates understanding of disease process, treatment plan, medications, and discharge instructions  Description: Complete learning assessment and assess knowledge base    Interventions:  - Provide teaching at level of understanding  - Provide teaching via preferred learning methods  Outcome: Progressing     Problem: GASTROINTESTINAL - ADULT  Goal: Minimal or absence of nausea and/or vomiting  Description: INTERVENTIONS:  - Administer IV fluids if ordered to ensure adequate hydration  - Maintain NPO status until nausea and vomiting are resolved  - Nasogastric tube if ordered  - Administer ordered antiemetic medications as needed  - Provide nonpharmacologic comfort measures as appropriate  - Advance diet as tolerated, if ordered  - Consider nutrition services referral to assist patient with adequate nutrition and appropriate food choices  Outcome: Progressing  Goal: Maintains or returns to baseline bowel function  Description: INTERVENTIONS:  - Assess bowel function  - Encourage oral fluids to ensure adequate hydration  - Administer IV fluids if ordered to ensure adequate hydration  - Administer ordered medications as needed  - Encourage mobilization and activity  - Consider nutritional services referral to assist patient with adequate nutrition and appropriate food choices  Outcome: Progressing  Goal: Maintains adequate nutritional intake  Description: INTERVENTIONS:  - Monitor percentage of each meal consumed  - Identify factors contributing to decreased intake, treat as appropriate  - Assist with meals as needed  - Monitor I&O, weight, and lab values if indicated  - Obtain nutrition services referral as needed  Outcome: Progressing     Problem: METABOLIC, FLUID AND ELECTROLYTES - ADULT  Goal: Electrolytes maintained within normal limits  Description: INTERVENTIONS:  - Monitor labs and assess patient for signs and symptoms of electrolyte imbalances  - Administer electrolyte replacement as ordered  - Monitor response to electrolyte replacements, including repeat lab results as appropriate  - Instruct patient on fluid and nutrition as appropriate  Outcome: Progressing  Goal: Fluid balance maintained  Description: INTERVENTIONS:  - Monitor labs   - Monitor I/O and WT  - Instruct patient on fluid and nutrition as appropriate  - Assess for signs & symptoms of volume excess or deficit  Outcome: Progressing

## 2022-12-23 NOTE — ASSESSMENT & PLAN NOTE
Hgb of 8 2 and stable  Suspect also component of hemodilution  · History of iron deficiency anemia  · Iron Panel with JAQUI, will require iron supplementation on discharge  · OP colonoscopy should be scheduled   · Repeat CBC in a m

## 2022-12-24 VITALS
HEART RATE: 95 BPM | BODY MASS INDEX: 32.21 KG/M2 | TEMPERATURE: 98.7 F | SYSTOLIC BLOOD PRESSURE: 140 MMHG | DIASTOLIC BLOOD PRESSURE: 87 MMHG | WEIGHT: 212.52 LBS | RESPIRATION RATE: 18 BRPM | HEIGHT: 68 IN | OXYGEN SATURATION: 100 %

## 2022-12-24 LAB
ANION GAP SERPL CALCULATED.3IONS-SCNC: 11 MMOL/L (ref 4–13)
BUN SERPL-MCNC: 6 MG/DL (ref 5–25)
CALCIUM SERPL-MCNC: 8.1 MG/DL (ref 8.3–10.1)
CHLORIDE SERPL-SCNC: 109 MMOL/L (ref 96–108)
CO2 SERPL-SCNC: 20 MMOL/L (ref 21–32)
CREAT SERPL-MCNC: 0.86 MG/DL (ref 0.6–1.3)
ERYTHROCYTE [DISTWIDTH] IN BLOOD BY AUTOMATED COUNT: 18.7 % (ref 11.6–15.1)
G LAMBLIA AG STL QL IA: NEGATIVE
GFR SERPL CREATININE-BSD FRML MDRD: 79 ML/MIN/1.73SQ M
GLUCOSE SERPL-MCNC: 93 MG/DL (ref 65–140)
HCT VFR BLD AUTO: 24.5 % (ref 34.8–46.1)
HGB BLD-MCNC: 7.4 G/DL (ref 11.5–15.4)
MCH RBC QN AUTO: 23.8 PG (ref 26.8–34.3)
MCHC RBC AUTO-ENTMCNC: 30.2 G/DL (ref 31.4–37.4)
MCV RBC AUTO: 79 FL (ref 82–98)
PLATELET # BLD AUTO: 227 THOUSANDS/UL (ref 149–390)
PMV BLD AUTO: 10.3 FL (ref 8.9–12.7)
POTASSIUM SERPL-SCNC: 3.3 MMOL/L (ref 3.5–5.3)
RBC # BLD AUTO: 3.11 MILLION/UL (ref 3.81–5.12)
SODIUM SERPL-SCNC: 140 MMOL/L (ref 135–147)
WBC # BLD AUTO: 7.11 THOUSAND/UL (ref 4.31–10.16)

## 2022-12-24 RX ORDER — DICYCLOMINE HYDROCHLORIDE 10 MG/1
10 CAPSULE ORAL 4 TIMES DAILY PRN
Qty: 30 CAPSULE | Refills: 0 | Status: SHIPPED | OUTPATIENT
Start: 2022-12-24

## 2022-12-24 RX ORDER — AZITHROMYCIN 500 MG/1
500 TABLET, FILM COATED ORAL EVERY 24 HOURS
Qty: 1 TABLET | Refills: 0 | Status: SHIPPED | OUTPATIENT
Start: 2022-12-25 | End: 2022-12-26

## 2022-12-24 RX ORDER — FAMOTIDINE 40 MG/1
40 TABLET, FILM COATED ORAL 2 TIMES DAILY
Qty: 60 TABLET | Refills: 0 | Status: SHIPPED | OUTPATIENT
Start: 2022-12-24

## 2022-12-24 RX ORDER — POTASSIUM CHLORIDE 20 MEQ/1
40 TABLET, EXTENDED RELEASE ORAL ONCE
Status: COMPLETED | OUTPATIENT
Start: 2022-12-24 | End: 2022-12-24

## 2022-12-24 RX ORDER — FERROUS SULFATE TAB EC 324 MG (65 MG FE EQUIVALENT) 324 (65 FE) MG
324 TABLET DELAYED RESPONSE ORAL
Qty: 30 TABLET | Refills: 0 | Status: SHIPPED | OUTPATIENT
Start: 2022-12-26

## 2022-12-24 RX ADMIN — SERTRALINE HYDROCHLORIDE 50 MG: 50 TABLET ORAL at 07:40

## 2022-12-24 RX ADMIN — GLYCERIN, HYPROMELLOSE, POLYETHYLENE GLYCOL 1 DROP: .2; .2; 1 LIQUID OPHTHALMIC at 07:40

## 2022-12-24 RX ADMIN — FAMOTIDINE 40 MG: 20 TABLET, FILM COATED ORAL at 07:41

## 2022-12-24 RX ADMIN — POTASSIUM CHLORIDE 40 MEQ: 1500 TABLET, EXTENDED RELEASE ORAL at 10:28

## 2022-12-24 RX ADMIN — ACETAMINOPHEN 650 MG: 325 TABLET, FILM COATED ORAL at 07:41

## 2022-12-24 RX ADMIN — METOPROLOL SUCCINATE 25 MG: 25 TABLET, EXTENDED RELEASE ORAL at 07:41

## 2022-12-24 RX ADMIN — ENOXAPARIN SODIUM 40 MG: 40 INJECTION SUBCUTANEOUS at 07:41

## 2022-12-24 NOTE — PLAN OF CARE
Problem: Potential for Falls  Goal: Patient will remain free of falls  Description: INTERVENTIONS:  - Educate patient/family on patient safety including physical limitations  - Instruct patient to call for assistance with activity   - Consult OT/PT to assist with strengthening/mobility   - Keep Call bell within reach  - Keep bed low and locked with side rails adjusted as appropriate  - Keep care items and personal belongings within reach  - Initiate and maintain comfort rounds  - Make Fall Risk Sign visible to staff  - Apply yellow socks and bracelet for high fall risk patients  - Consider moving patient to room near nurses station  Outcome: Progressing     Problem: PAIN - ADULT  Goal: Verbalizes/displays adequate comfort level or baseline comfort level  Description: Interventions:  - Encourage patient to monitor pain and request assistance  - Assess pain using appropriate pain scale  - Administer analgesics based on type and severity of pain and evaluate response  - Implement non-pharmacological measures as appropriate and evaluate response  - Consider cultural and social influences on pain and pain management  - Notify physician/advanced practitioner if interventions unsuccessful or patient reports new pain  Outcome: Progressing     Problem: INFECTION - ADULT  Goal: Absence or prevention of progression during hospitalization  Description: INTERVENTIONS:  - Assess and monitor for signs and symptoms of infection  - Monitor lab/diagnostic results  - Monitor all insertion sites, i e  indwelling lines, tubes, and drains  - Monitor endotracheal if appropriate and nasal secretions for changes in amount and color  - [place] appropriate cooling/warming therapies per order  - Administer medications as ordered  - Instruct and encourage patient and family to use good hand hygiene technique  - Identify and instruct in appropriate isolation precautions for identified infection/condition  Outcome: Progressing  Goal: Absence of fever/infection during neutropenic period  Description: INTERVENTIONS:  - Monitor WBC    Outcome: Progressing     Problem: SAFETY ADULT  Goal: Patient will remain free of falls  Description: INTERVENTIONS:  - Educate patient/family on patient safety including physical limitations  - Instruct patient to call for assistance with activity   - Consult OT/PT to assist with strengthening/mobility   - Keep Call bell within reach  - Keep bed low and locked with side rails adjusted as appropriate  - Keep care items and personal belongings within reach  - Initiate and maintain comfort rounds  - Make Fall Risk Sign visible to staff  - Apply yellow socks and bracelet for high fall risk patients  - Consider moving patient to room near nurses station  Outcome: Progressing  Goal: Maintain or return to baseline ADL function  Description: INTERVENTIONS:  -  Assess patient's ability to carry out ADLs; assess patient's baseline for ADL function and identify physical deficits which impact ability to perform ADLs (bathing, care of mouth/teeth, toileting, grooming, dressing, etc )  - Assess/evaluate cause of self-care deficits   - Assess range of motion  - Assess patient's mobility; develop plan if impaired  - Assess patient's need for assistive devices and provide as appropriate  - Encourage maximum independence but intervene and supervise when necessary  - Involve family in performance of ADLs  - Assess for home care needs following discharge   - Consider OT consult to assist with ADL evaluation and planning for discharge  - Provide patient education as appropriate  Outcome: Progressing  Goal: Maintains/Returns to pre admission functional level  Description: INTERVENTIONS:  - Perform BMAT or MOVE assessment daily    - Set and communicate daily mobility goal to care team and patient/family/caregiver     - Collaborate with rehabilitation services on mobility goals if consulted  - Out of bed for toileting  - Record patient progress and toleration of activity level   Outcome: Progressing     Problem: DISCHARGE PLANNING  Goal: Discharge to home or other facility with appropriate resources  Description: INTERVENTIONS:  - Identify barriers to discharge w/patient and caregiver  - Arrange for needed discharge resources and transportation as appropriate  - Identify discharge learning needs (meds, wound care, etc )  - Arrange for interpretive services to assist at discharge as needed  - Refer to Case Management Department for coordinating discharge planning if the patient needs post-hospital services based on physician/advanced practitioner order or complex needs related to functional status, cognitive ability, or social support system  Outcome: Progressing     Problem: Knowledge Deficit  Goal: Patient/family/caregiver demonstrates understanding of disease process, treatment plan, medications, and discharge instructions  Description: Complete learning assessment and assess knowledge base    Interventions:  - Provide teaching at level of understanding  - Provide teaching via preferred learning methods  Outcome: Progressing     Problem: GASTROINTESTINAL - ADULT  Goal: Minimal or absence of nausea and/or vomiting  Description: INTERVENTIONS:  - Administer IV fluids if ordered to ensure adequate hydration  - Maintain NPO status until nausea and vomiting are resolved  - Nasogastric tube if ordered  - Administer ordered antiemetic medications as needed  - Provide nonpharmacologic comfort measures as appropriate  - Advance diet as tolerated, if ordered  - Consider nutrition services referral to assist patient with adequate nutrition and appropriate food choices  Outcome: Progressing  Goal: Maintains or returns to baseline bowel function  Description: INTERVENTIONS:  - Assess bowel function  - Encourage oral fluids to ensure adequate hydration  - Administer IV fluids if ordered to ensure adequate hydration  - Administer ordered medications as needed  - Encourage mobilization and activity  - Consider nutritional services referral to assist patient with adequate nutrition and appropriate food choices  Outcome: Progressing  Goal: Maintains adequate nutritional intake  Description: INTERVENTIONS:  - Monitor percentage of each meal consumed  - Identify factors contributing to decreased intake, treat as appropriate  - Assist with meals as needed  - Monitor I&O, weight, and lab values if indicated  - Obtain nutrition services referral as needed  Outcome: Progressing     Problem: METABOLIC, FLUID AND ELECTROLYTES - ADULT  Goal: Electrolytes maintained within normal limits  Description: INTERVENTIONS:  - Monitor labs and assess patient for signs and symptoms of electrolyte imbalances  - Administer electrolyte replacement as ordered  - Monitor response to electrolyte replacements, including repeat lab results as appropriate  - Instruct patient on fluid and nutrition as appropriate  Outcome: Progressing  Goal: Fluid balance maintained  Description: INTERVENTIONS:  - Monitor labs   - Monitor I/O and WT  - Instruct patient on fluid and nutrition as appropriate  - Assess for signs & symptoms of volume excess or deficit  Outcome: Progressing

## 2022-12-24 NOTE — ASSESSMENT & PLAN NOTE
Hgb of 7 4, suspect a combination of hemodilution as well as iron deficiency anemia  · Will initiate patient on iron supplementation to begin on 12/26  · Outpatient colonoscopy will be scheduled, gastroenterology referral placed  · CBC to be obtained as an outpatient on 12/26, patient aware of same  · No signs of bleeding at this time, denies any hematochezia, melena,

## 2022-12-24 NOTE — NURSING NOTE
Pt discharged home, IV removed, medication details explained, follow up appts and blood work discussed

## 2022-12-24 NOTE — ASSESSMENT & PLAN NOTE
Present on admission evidenced by fever, tachycardia and likely infectious diarrhea  · No evidence of endorgan damage  · Blood cultures obtained and pending  · Stool cultures obtained and pending  · Positive for Campylobacter  · Now resolved after the administration of p o  azithromycin, IV fluids

## 2022-12-24 NOTE — DISCHARGE SUMMARY
Mathew 50  Discharge- Merlinda Highman 1970, 50 y o  female MRN: [id number]  Unit/Bed#: E5 -01 Encounter: [id number]  Primary Care Provider: Marti Haney MD   Date and time admitted to hospital: 12/22/2022  1:23 AM    * Diarrhea of infectious origin  Assessment & Plan  Patient presented after >25 episodes of diarrhea leading to dehydration and presyncope  · Differential includes bacterial gastroenteritis vs viral gastroenteritis   · Stool PRC ordered, giardia ordered, c diff pcr ordered, ova and parasite ordered  · Positive for Campylobacter  · Switch from IV Unasyn to azithromycin x3 doses  · Electrolytes repleted  · CT scan abdomen pelvis with pancolitis  · Gastroenterology consulted, patient recommendations  · Continue bland diet for 2 weeks  · Follow-up with gastroenterology as an outpatient with colonoscopy  · Bentyl as needed    Sepsis [facility])  Assessment & Plan  Present on admission evidenced by fever, tachycardia and likely infectious diarrhea  · No evidence of endorgan damage  · Blood cultures obtained and pending  · Stool cultures obtained and pending  · Positive for Campylobacter  · Now resolved after the administration of p o  azithromycin, IV fluids    Anemia  Assessment & Plan  Hgb of 7 4, suspect a combination of hemodilution as well as iron deficiency anemia  · Will initiate patient on iron supplementation to begin on 12/26  · Outpatient colonoscopy will be scheduled, gastroenterology referral placed  · CBC to be obtained as an outpatient on 12/26, patient aware of same  · No signs of bleeding at this time, denies any hematochezia, melena,    Primary hypertension  Assessment & Plan  Continue metoprolol 25 mg daily  · Patient prescribed losartan 25 mg daily outpatient, patient reports she never started  · Follows with [facility]ology outpatient    Factor V Leiden mutation [facility])  Assessment & Plan  · DVT prophylaxis      Medical Problems     Resolved Problems  Date Reviewed: 12/24/2022   None       Discharging Physician / Practitioner: Mervat Handy PA-C  PCP: Romayne Florida, MD  Admission Date:   Admission Orders (From admission, onward)     Ordered        12/22/22 0657  Place in Observation  Once                      Discharge Date: 12/24/22    Consultations During Hospital Stay:  · Gastroenterology    Procedures Performed:   · None    Significant Findings / Test Results:   CT abdomen pelvis w contrast    Result Date: 12/22/2022  Narrative: CT ABDOMEN AND PELVIS WITH IV CONTRAST INDICATION:   Sepsis Abdominal pain, sepsis unknown origin  COMPARISON:  No prior CT scans of the abdomen or pelvis  TECHNIQUE:  CT examination of the abdomen and pelvis was performed  Axial, sagittal, and coronal 2D reformatted images were created from the source data and submitted for interpretation  Radiation dose length product (DLP) for this visit:  662 mGy-cm   This examination, like all CT scans performed in the [facility], was performed utilizing techniques to minimize radiation dose exposure, including the use of iterative reconstruction and automated exposure control  IV Contrast:  100 mL of iohexol (OMNIPAQUE) Enteric Contrast:  Enteric contrast was not administered  FINDINGS: ABDOMEN LOWER CHEST:  No clinically significant abnormality identified in the visualized lower chest  LIVER/BILIARY TREE:  Unremarkable  GALLBLADDER:  No calcified gallstones  No pericholecystic inflammatory change  SPLEEN:  Unremarkable  PANCREAS:  Unremarkable  ADRENAL GLANDS:  Unremarkable  KIDNEYS/URETERS:  No calculi or hydronephrosis  1 cm low-attenuation lesion outer posterior lateral cortex of the right kidney too small to characterize but statistically benign  STOMACH AND BOWEL:  The entire colon is mildly thickened, and fluid-filled compatible with colitis  There is no pneumatosis or free air  APPENDIX:  A normal appendix was visualized   ABDOMINOPELVIC CAVITY:  Trace free fluid in the cul-de-sac  VESSELS:  Unremarkable for patient's age  PELVIS REPRODUCTIVE ORGANS:  Mildly heterogeneous focus posterior uterus measuring approximately 4 3 x 3 cm, most likely either a fibroid or focal adenomyosis  Ovaries unremarkable with a corpus luteum in the right ovary  URINARY BLADDER:  Unremarkable  ABDOMINAL WALL/INGUINAL REGIONS:  Fat-containing umbilical hernia  OSSEOUS STRUCTURES:  No acute fracture or destructive osseous lesion  Spinal degenerative changes are noted  Impression: Findings compatible with pan colitis  No pneumatosis or free air  The study was marked in [facility] for immediate notification  Workstation performed: [id number]       Incidental Findings:   · See above    Test Results Pending at Discharge (will require follow up): · None     Outpatient Tests Requested:  · CBC on 02/79    Complications:  none    Reason for Admission: Campylobacter diarrhea    Hospital Course: Higinio Fabry is a 50 y o  female patient with a past medical history of factor V Leiden, chronic anemia who originally presented to the hospital on 12/22/2022 due to diarrhea  According to the patient on the day of admission, she reported greater than 25 episodes of diarrhea which led to dehydration and presyncope  She reported on the day of admission that she had a chicken gyro from a [employer] and following this began to experience severe diarrhea  During her hospital stay, the patient was continued on IV fluids  Her electrolytes were repleted accordingly  She continued to have persistent diarrhea and stool studies were obtained  To her diarrhea persistence, gastroenterology was additionally consulted  The patient did need to have fevers throughout her hospital stay and therefore was initiated on IV Unasyn and a CT scan of her abdomen/pelvis was obtained revealing pancolitis    Ultimately, stool studies resulted in Campylobacter and the patient was promptly initiated on azithromycin x3 doses  After the first dose of azithromycin, the patient significantly improved and overnight, her diarrhea significantly decreased  She has only had 1 episode of diarrhea in the last 12 hours  She has also remained afebrile for greater than 24 hours  Of note, her hemoglobin was noted to be low on arrival to the ED however has reported chronic anemia  It appears that she has both a component of iron deficiency anemia as well as hemodilution in the setting of aggressive IV fluid resuscitation  It has been discussed with her that it is important that she follow-up with her primary care provider as well as have a CBC this coming Monday on 12/26/2022 to ensure that her hemoglobin is stable  She will follow-up with GI as an outpatient for outpatient colonoscopy  Referral has been placed  Please see above list of diagnoses and related plan for additional information  Condition at Discharge: stable    Discharge Day Visit / Exam:   Subjective: The patient is seen resting comfortably in bed  She is very excited to go home today as it is her '[age]  She denies any worsening diarrhea  States that she only had 1 episode of diarrhea overnight, she also was able to her breakfast and drink this morning without any problems  We have discussed her anemia in length and she will obtain a CBC this coming Monday for follow-up to ensure hemoglobin is stable  Vitals: Blood Pressure: 140/87 (12/24/22 0700)  Pulse: 95 (12/24/22 0700)  Temperature: 98 7 °F (37 1 °C) (12/24/22 0700)  Temp Source: Oral (12/24/22 0700)  Respirations: 18 (12/24/22 0700)  Height: 5' 7 5" (171 5 cm) (12/22/22 1441)  Weight - Scale: 96 4 kg (212 lb 8 4 oz) (12/24/22 0622)  SpO2: 100 % (12/24/22 0700)  Exam:   Physical Exam  Vitals and nursing note reviewed  Constitutional:       General: She is not in acute distress  Appearance: Normal appearance  She is not ill-appearing, toxic-appearing or diaphoretic     HENT:      Head: Normocephalic and atraumatic  Cardiovascular:      Rate and Rhythm: Normal rate and regular rhythm  Heart sounds: No murmur heard  No friction rub  No gallop  Pulmonary:      Effort: Pulmonary effort is normal  No respiratory distress  Breath sounds: Normal breath sounds  No wheezing, rhonchi or rales  Abdominal:      General: Abdomen is flat  Bowel sounds are normal  There is no distension  Palpations: Abdomen is soft  Tenderness: There is no abdominal tenderness  Musculoskeletal:      Right lower leg: No edema  Left lower leg: No edema  Skin:     General: Skin is warm and dry  Coloration: Skin is not jaundiced or pale  Neurological:      General: No focal deficit present  Mental Status: She is alert and oriented to person, place, and time  Mental status is at baseline  Discussion with Family: Updated  () at bedside  Discharge instructions/Information to patient and family:   See after visit summary for information provided to patient and family  Provisions for Follow-Up Care:  See after visit summary for information related to follow-up care and any pertinent home health orders  Disposition:   Home    Planned Readmission: n/a     Discharge Statement:  I spent 32 minutes discharging the patient  This time was spent on the day of discharge  I had direct contact with the patient on the day of discharge  Greater than 50% of the total time was spent examining patient, answering all patient questions, arranging and discussing plan of care with patient as well as directly providing post-discharge instructions  Additional time then spent on discharge activities  Discharge Medications:  See after visit summary for reconciled discharge medications provided to patient and/or family        **Please Note: This note may have been constructed using a voice recognition system**

## 2022-12-24 NOTE — ASSESSMENT & PLAN NOTE
Continue metoprolol 25 mg daily  · Patient prescribed losartan 25 mg daily outpatient, patient reports she never started  · Follows with [facility] cardiology outpatient

## 2022-12-24 NOTE — UTILIZATION REVIEW
Continued Stay Review    Date: 12/24                           Current Patient Class:  OBSERVATION  Current Level of Care: Med surg     HPI:52 y o  female initially admitted on  12/22 0657 as observation after presents to ED via EMS due to lightheaded, dizzy, palpitations, abdominal cramping  Febrile GI consulted   12/23 Pt identified to have pancolitis secondary to Campylobacter       Assessment/Plan:     Vital Signs:      Pertinent Labs/Diagnostic Results:   Results from last 7 days   Lab Units 12/22/22  0206   SARS-COV-2  Negative     Results from last 7 days   Lab Units 12/24/22  0615 12/23/22  0427 12/22/22  1104 12/22/22  0206   WBC Thousand/uL 7 11 8 24  --  10 29*   HEMOGLOBIN g/dL 7 4* 8 2*  --  8 8*   HEMATOCRIT % 24 5* 26 6*  --  29 1*   PLATELETS Thousands/uL 227 260 350 290   NEUTROS ABS Thousands/µL  --   --   --  8 93*         Results from last 7 days   Lab Units 12/24/22  0615 12/23/22  0427 12/22/22  0206   SODIUM mmol/L 140 138 135   POTASSIUM mmol/L 3 3* 2 9* 3 9   CHLORIDE mmol/L 109* 105 103   CO2 mmol/L 20* 22 22   ANION GAP mmol/L 11 11 10   BUN mg/dL 6 8 17   CREATININE mg/dL 0 86 0 89 0 88   EGFR ml/min/1 73sq m 79 75 76   CALCIUM mg/dL 8 1* 8 2* 8 7   MAGNESIUM mg/dL  --   --  1 4*     Results from last 7 days   Lab Units 12/22/22  0206   AST U/L 19   ALT U/L 30   ALK PHOS U/L 79   TOTAL PROTEIN g/dL 7 7   ALBUMIN g/dL 3 2*   TOTAL BILIRUBIN mg/dL 0 46         Results from last 7 days   Lab Units 12/24/22  0615 12/23/22  0427 12/22/22  0206   GLUCOSE RANDOM mg/dL 93 112 103             No results found for: BETA-HYDROXYBUTYRATE                   Results from last 7 days   Lab Units 12/22/22  0408 12/22/22  0206   HS TNI 0HR ng/L  --  20   HS TNI 2HR ng/L 17  --    HSTNI D2 ng/L -3  --                  Results from last 7 days   Lab Units 12/23/22  0427 12/22/22  0206   PROCALCITONIN ng/ml 0 06 <0 05     Results from last 7 days   Lab Units 12/22/22  1805   LACTIC ACID mmol/L 1 2 Results from last 7 days   Lab Units 12/22/22  0206   FERRITIN ng/mL 5*                         Results from last 7 days   Lab Units 12/23/22  0456   CLARITY UA  Clear   COLOR UA  Yellow   SPEC GRAV UA  1 010   PH UA  6 0   GLUCOSE UA mg/dl Negative   KETONES UA mg/dl Negative   BLOOD UA  Negative   PROTEIN UA mg/dl Negative   NITRITE UA  Negative   BILIRUBIN UA  Negative   UROBILINOGEN UA E U /dl 0 2   LEUKOCYTES UA  Negative     Results from last 7 days   Lab Units 12/22/22  0206   INFLUENZA A PCR  Negative   INFLUENZA B PCR  Negative   RSV PCR  Negative                 Results from last 7 days   Lab Units 12/22/22  0729   C DIFF TOXIN B BY PCR  Negative     Results from last 7 days   Lab Units 12/22/22  0729   SALMONELLA SP PCR  None Detected   SHIGELLA SP/ENTEROINVASIVE E  COLI (EIEC)  None Detected   CAMPYLOBACTER SP (JEJUNI AND COLI)  Detected*   SHIGA TOXIN 1/SHIGA TOXIN 2  None Detected         Results from last 7 days   Lab Units 12/22/22  1112 12/22/22  1103   BLOOD CULTURE  No Growth at 24 hrs  No Growth at 24 hrs  Medications:   Scheduled Medications:  azithromycin, 500 mg, Oral, Q24H  enoxaparin, 40 mg, Subcutaneous, Daily  famotidine, 40 mg, Oral, BID  glycerin-hypromellose-, 1 drop, Both Eyes, Q12H ALICE  metoprolol succinate, 25 mg, Oral, Daily  sertraline, 50 mg, Oral, Daily      Continuous IV Infusions:  sodium chloride 0 9 % with KCl 20 mEq/L, 100 mL/hr, Intravenous, Continuous      PRN Meds:  acetaminophen, 650 mg, Oral, Q6H PRN  aluminum-magnesium hydroxide-simethicone, 30 mL, Oral, Q4H PRN  calcium carbonate, 1,000 mg, Oral, Daily PRN  dicyclomine, 10 mg, Oral, 4x Daily PRN  ketorolac, 15 mg, Intravenous, Q6H PRN  ondansetron, 4 mg, Intravenous, Q6H PRN        Discharge Plan: TBD     Network Utilization Review Department  ATTENTION: Please call with any questions or concerns to [phone] and carefully listen to the prompts so that you are directed to the right person  All voicemails are confidential   Sweta Bond all requests for admission clinical reviews, approved or denied determinations and any other requests to dedicated fax number below belonging to the campus where the patient is receiving treatment   List of dedicated fax numbers for the Facilities:  [address] DENIALS (Administrative/Medical Necessity) [phone]   [address] (Maternity/NICU/Pediatrics) [phone]   1 Marylu Mulligan [phone]   Andrewtavon Tillman 77 [phone]   [address] [address] [facility] [phone]   155 [facility] 134 815 [facility] [phone]

## 2022-12-24 NOTE — ASSESSMENT & PLAN NOTE
Patient presented after >25 episodes of diarrhea leading to dehydration and presyncope  · Differential includes bacterial gastroenteritis vs viral gastroenteritis   · Stool PRC ordered, giardia ordered, c diff pcr ordered, ova and parasite ordered  · Positive for Campylobacter  · Switch from IV Unasyn to azithromycin x3 doses  · Electrolytes repleted  · CT scan abdomen pelvis with pancolitis  · Gastroenterology consulted, patient recommendations  · Continue bland diet for 2 weeks  · Follow-up with gastroenterology as an outpatient with colonoscopy  · Bentyl as needed

## 2022-12-24 NOTE — INCIDENTAL FINDINGS
The following findings require follow up:  Radiographic finding   Finding: Inflammation of the colon   Follow up required: Colonoscopy, follow-up with outpatient gastroenterology   Follow up should be done within 1 month(s)    Please notify the following clinician to assist with the follow up:   Dr Ray Gregorio MD

## 2022-12-25 LAB
CALPROTECTIN STL-MCNT: 118 UG/G (ref 0–120)
WBC SPEC QL GRAM STN: NORMAL

## 2022-12-26 ENCOUNTER — APPOINTMENT (OUTPATIENT)
Dept: LAB | Facility: HOSPITAL | Age: 52
End: 2022-12-26

## 2022-12-26 DIAGNOSIS — D64.9 CHRONIC ANEMIA: ICD-10-CM

## 2022-12-26 LAB
ANISOCYTOSIS BLD QL SMEAR: PRESENT
BASOPHILS # BLD MANUAL: 0 THOUSAND/UL (ref 0–0.1)
BASOPHILS NFR MAR MANUAL: 0 % (ref 0–1)
EOSINOPHIL # BLD MANUAL: 0.06 THOUSAND/UL (ref 0–0.4)
EOSINOPHIL NFR BLD MANUAL: 1 % (ref 0–6)
ERYTHROCYTE [DISTWIDTH] IN BLOOD BY AUTOMATED COUNT: 18.3 % (ref 11.6–15.1)
HCT VFR BLD AUTO: 28.1 % (ref 34.8–46.1)
HGB BLD-MCNC: 8.5 G/DL (ref 11.5–15.4)
LYMPHOCYTES # BLD AUTO: 1.78 THOUSAND/UL (ref 0.6–4.47)
LYMPHOCYTES # BLD AUTO: 31 % (ref 14–44)
MCH RBC QN AUTO: 23.9 PG (ref 26.8–34.3)
MCHC RBC AUTO-ENTMCNC: 30.2 G/DL (ref 31.4–37.4)
MCV RBC AUTO: 79 FL (ref 82–98)
MONOCYTES # BLD AUTO: 0.29 THOUSAND/UL (ref 0–1.22)
MONOCYTES NFR BLD: 5 % (ref 4–12)
NEUTROPHILS # BLD MANUAL: 3.62 THOUSAND/UL (ref 1.85–7.62)
NEUTS SEG NFR BLD AUTO: 63 % (ref 43–75)
PLATELET # BLD AUTO: 343 THOUSANDS/UL (ref 149–390)
PLATELET BLD QL SMEAR: ADEQUATE
PMV BLD AUTO: 10.4 FL (ref 8.9–12.7)
RBC # BLD AUTO: 3.56 MILLION/UL (ref 3.81–5.12)
WBC # BLD AUTO: 5.75 THOUSAND/UL (ref 4.31–10.16)
YERSINIA STL CULT: NORMAL

## 2022-12-27 ENCOUNTER — TELEPHONE (OUTPATIENT)
Dept: GASTROENTEROLOGY | Facility: MEDICAL CENTER | Age: 52
End: 2022-12-27

## 2022-12-27 LAB
BACTERIA BLD CULT: NORMAL
BACTERIA BLD CULT: NORMAL

## 2022-12-27 NOTE — TELEPHONE ENCOUNTER
----- Message from Ashok Bazzi PA-C sent at 12/23/2022 12:51 PM EST -----  Patient pending hospital discharge, currently admitted with pancolitis secondary to campylobacter  Needs outpatient colonoscopy  Please schedule 2-3 weeks follow up to discuss  Thanks!   Juanita

## 2022-12-27 NOTE — TELEPHONE ENCOUNTER
Patients GI provider:  PA: Zehra Buckner    Number to return call: [phone]    Reason for call: Pt calling requesting to speak with someone regarding a sooner hospital follow up    Scheduled procedure/appointment date if applicable: Apt/procedure 1-25-23

## 2023-01-16 NOTE — PROGRESS NOTES
Oncology Outpatient Consult Note  Kale Novladimir 50 y o  female MRN: @ Encounter: [id number]        Date:  1/16/2023        Assessment/ Plan:      # Iron deficiency anemia 2/2 menorrhagia    Ms Quincy Augustin is a 50year old female with hx of GERD, factor V Leiden mutation (no hx of any VTE episodes) and recent hospitalization for Campylobacter infection who presented to hematology clinic for evaluation of iron deficiency anemia  Most recent CBC shows Hgb 8 5 with low MCV of 79, and elevated RDW of 18 3  Iron panel showed decreased iron saturation of 6%, elevated TIBC of 468 ug/dL, low iron of 26, and ferritin of 5  Patient reports heavy menstrual cycles with occasional passing of clots over past couple years  Patient likely has iron deficiency anemia 2/2 menorrhagia  Patient is awaiting GI workup with colonoscopy to evaluate for GI etiology causing the iron deficiency anemia (patient was recommended to discuss with GI physician about getting an EGD also the same day as colonoscopy to evaluate for an upper GI source of bleeding)  We discussed about treatment with IV iron, but patient has started taking oral iron tablets daily, which she is tolerating without difficulties  Recommend continuing oral iron supplementation for now, followed by repeat CBC and iron panel in about 4 months to evaluate for response to oral iron supplementation - these bloodwork can be followed up by her PCP and patient can be seen by hematology on a PRN basis  Patient was in agreement with the plan as noted above  Labs and imaging studies are reviewed by ordering provider once results are available  If there are findings that need immediate attention, you will be contacted when results available  Discussing results and the implication on your healthcare is best discussed in person at your follow-up visit  CC:  Concern for JAQUI    HPI:  Kale Handy is seen for initial consultation 1/17/2023 for JAQUI       Ms Quincy Augustin is a 50year old female with long standing hx of GERD (on Pepcid) and factor V Leiden mutation (no hx of VTE events) who presented for initial consultation regarding iron deficiency anemia  Patient with recent hospitalization at [facility] in December 2022 with complaints of severe diarrhea and associated presyncope 2/2 volume depletion  Stool studies resulted positive for Campylobacter possibly 2/2 food poisoning, for which patient was started on azithromycin with notable improvement of her sx  Patient was noted to be anemia with Hgb in the 7-8g/dL range with low MCV of 79 and elevated RDW of 18  Patient's iron panel showed showed low iron saturation of 6%, elevated TIBC of 468, low iron of 26 ug/dL, and ferritin of 5  Patient was referred to GI to be f/u as an OP  Patient denies any dark stools or bright red blood per rectum  Has chronic fatigue  She did report heavy menstrual cycles (especially first day of each cycle) with occasional passing of clots over past couple years  Patient works as an  at [employer]  Test Results:    Imaging:  CT abdomen pelvis w contrast    Result Date: 12/22/2022  Narrative: CT ABDOMEN AND PELVIS WITH IV CONTRAST INDICATION:   Sepsis Abdominal pain, sepsis unknown origin  COMPARISON:  No prior CT scans of the abdomen or pelvis  TECHNIQUE:  CT examination of the abdomen and pelvis was performed  Axial, sagittal, and coronal 2D reformatted images were created from the source data and submitted for interpretation  Radiation dose length product (DLP) for this visit:  662 mGy-cm   This examination, like all CT scans performed in the [facility], was performed utilizing techniques to minimize radiation dose exposure, including the use of iterative reconstruction and automated exposure control  IV Contrast:  100 mL of iohexol (OMNIPAQUE) Enteric Contrast:  Enteric contrast was not administered   FINDINGS: ABDOMEN LOWER CHEST:  No clinically significant abnormality identified in the visualized lower chest  LIVER/BILIARY TREE:  Unremarkable  GALLBLADDER:  No calcified gallstones  No pericholecystic inflammatory change  SPLEEN:  Unremarkable  PANCREAS:  Unremarkable  ADRENAL GLANDS:  Unremarkable  KIDNEYS/URETERS:  No calculi or hydronephrosis  1 cm low-attenuation lesion outer posterior lateral cortex of the right kidney too small to characterize but statistically benign  STOMACH AND BOWEL:  The entire colon is mildly thickened, and fluid-filled compatible with colitis  There is no pneumatosis or free air  APPENDIX:  A normal appendix was visualized  ABDOMINOPELVIC CAVITY:  Trace free fluid in the cul-de-sac  VESSELS:  Unremarkable for patient's age  PELVIS REPRODUCTIVE ORGANS:  Mildly heterogeneous focus posterior uterus measuring approximately 4 3 x 3 cm, most likely either a fibroid or focal adenomyosis  Ovaries unremarkable with a corpus luteum in the right ovary  URINARY BLADDER:  Unremarkable  ABDOMINAL WALL/INGUINAL REGIONS:  Fat-containing umbilical hernia  OSSEOUS STRUCTURES:  No acute fracture or destructive osseous lesion  Spinal degenerative changes are noted  Impression: Findings compatible with pan colitis  No pneumatosis or free air  The study was marked in [facility] for immediate notification   Workstation performed: [id number]       Labs:   Lab Results   Component Value Date    WBC 5 75 12/26/2022    HGB 8 5 (L) 12/26/2022    HCT 28 1 (L) 12/26/2022    MCV 79 (L) 12/26/2022     12/26/2022     Lab Results   Component Value Date    K 3 3 (L) 12/24/2022     (H) 12/24/2022    CO2 20 (L) 12/24/2022    BUN 6 12/24/2022    CREATININE 0 86 12/24/2022    GLUF 112 (H) 12/23/2022    CALCIUM 8 1 (L) 12/24/2022    CORRECTEDCA 9 3 12/22/2022    AST 19 12/22/2022    ALT 30 12/22/2022    ALKPHOS 79 12/22/2022    EGFR 79 12/24/2022         No results found for: SPEP, UPEP    No results found for: PSA    No results found for: CEA    No results found for:     No results found for: AFP    Lab Results   Component Value Date    IRON 26 (L) 12/22/2022    TIBC 468 (H) 12/22/2022    FERRITIN 5 (L) 12/22/2022       No results found for: [id number]        ROS: Review of Systems   Constitutional: Positive for fatigue (intermittent episodes of fatigue)  Negative for appetite change, chills, fever and unexpected weight change  HENT:   Negative for hearing loss, mouth sores, nosebleeds and sore throat  Eyes: Negative for eye problems and icterus  Respiratory: Negative for chest tightness, cough, shortness of breath and wheezing  Cardiovascular: Negative for chest pain and palpitations  Gastrointestinal: Negative for abdominal distention, abdominal pain, blood in stool, constipation, diarrhea (severe diarrheal episodes requiring hospitalization, now resolved), nausea and vomiting  Endocrine: Negative for hot flashes  Genitourinary: Positive for menstrual problem (heavy menstrual cycles over past couple years)  Negative for difficulty urinating, dyspareunia, dysuria, frequency and hematuria  Musculoskeletal: Negative for back pain, flank pain, gait problem, myalgias and neck pain  Skin: Negative for itching and rash  Neurological: Negative for dizziness, gait problem, headaches, light-headedness and speech difficulty  Hematological: Does not bruise/bleed easily  Psychiatric/Behavioral: Negative for confusion  The patient is not nervous/anxious           Active Problems:   Patient Active Problem List   Diagnosis   • [facility]   • Endometrial polyp   • Endometrial hyperplasia   • Difficult or painful urination   • Back pain   • Allergic reaction caused by a drug   • Leukemoid reaction   • Localized superficial swelling, mass, or lump   • Neck nodule   • Pelvic pain in female   • Skin sensation disturbance   • Palpitations   • Primary hypertension   • Dyslipidemia   • Diarrhea of infectious origin   • Anemia   • Sepsis [facility])       Past Medical History:   Past Medical History:   Diagnosis Date   • Bulging lumbar disc     L1-L2   • Endometrial hyperplasia     endometrial polyp   • Factor V deficiency ([facility] ) 2005   • Factor V Leiden ([facility] )    • GERD (gastroesophageal reflux disease)    • RSD lower limb        Surgical History:   Past Surgical History:   Procedure Laterality Date   •  SECTION     • EGD  2020    Dr Salomon Jordan  Symptoms of reflux after antibiotics  Biopsies negative for H  pylori, negative eosinophilic esophagitis, positive for reflux esophagitis  • FOOT SURGERY     • MOUTH SURGERY     • [place] EXC TUMOR SOFT TISS FACE&SCALP SUBFASCIAL <2CM N/A 2017    Procedure: EXCISION FOREHEAD MASS;  Surgeon: Renata Estrada MD;  Location:  MAIN OR;  Service: Plastics   • TUBAL LIGATION         Family History:    Family History   Problem Relation Age of Onset   • Colon polyps Mother    • Aneurysm Father    • Hypertension Father    • Thyroid cancer Brother    • Breast cancer Maternal Grandmother    • Breast cancer Maternal Aunt 76        mothers twin sister   • Breast cancer Other 39        maternal       Cancer-related family history includes Breast cancer in her maternal grandmother; Breast cancer (age of onset: 39) in her other; Breast cancer (age of onset: 76) in her maternal aunt; Thyroid cancer in her brother      Social History:   Social History     Socioeconomic History   • Marital status: /Civil Union     Spouse name: Not on file   • Number of children: Not on file   • Years of education: Not on file   • Highest education level: Not on file   Occupational History   • Occupation: hospital    Tobacco Use   • Smoking status: Former     Years: 3 00     Types: Cigarettes     Quit date:      Years since quittin 0   • Smokeless tobacco: Never   Vaping Use   • Vaping Use: Never used   Substance and Sexual Activity   • Alcohol use: Not Currently     Comment: rarely   • Drug use: No   • Sexual activity: Yes   Other Topics Concern   • Not on file   Social History Narrative   • Not on file     Social Determinants of Health     Financial Resource Strain: Not on file   Food Insecurity: Not on file   Transportation Needs: Not on file   Physical Activity: Not on file   Stress: Not on file   Social Connections: Not on file   Intimate Partner Violence: Not on file   Housing Stability: Not on file       Current Medications:   Current Outpatient Medications   Medication Sig Dispense Refill   • al mag oxide-diphenhydramine-lidocaine viscous (MAGIC MOUTHWASH) 1:1:1 suspension Swish and swallow 10 mL every 4 (four) hours as needed for mouth pain or discomfort (reflux pain) 90 mL 0   • cycloSPORINE (RESTASIS) 0 05 % ophthalmic emulsion Administer 1 drop to both eyes every 12 (twelve) hours     • dicyclomine (BENTYL) 10 mg capsule Take 1 capsule (10 mg total) by mouth 4 (four) times a day as needed (abdominal cramping) 30 capsule 0   • famotidine (PEPCID) 40 MG tablet Take 1 tablet (40 mg total) by mouth 2 (two) times a day 60 tablet 3   • ferrous sulfate 324 (65 Fe) mg Take 1 tablet (324 mg total) by mouth daily before breakfast Do not start before December 26, 2022  30 tablet 0   • metoprolol succinate (TOPROL-XL) 50 mg 24 hr tablet Take 50 mg by mouth daily     • sertraline (ZOLOFT) 50 mg tablet daily       No current facility-administered medications for this visit  Allergies: Allergies   Allergen Reactions   • Pantoprazole Hives and Anaphylaxis   • Diflucan [Fluconazole]    • Erythromycin GI Intolerance   • Tetracycline Vomiting   • Cefpodoxime Rash         Physical Exam:    There is no height or weight on file to calculate BSA      Wt Readings from Last 3 Encounters:   01/10/23 94 3 kg (208 lb)   12/24/22 96 4 kg (212 lb 8 4 oz)   12/02/22 96 9 kg (213 lb 9 6 oz)        Temp Readings from Last 3 Encounters:   01/10/23 97 9 °F (36 6 °C) (Temporal)   12/24/22 98 7 °F (37 1 °C) (Oral) 12/01/22 99 9 °F (37 7 °C) (Temporal)        BP Readings from Last 3 Encounters:   01/10/23 152/84   12/24/22 140/87   12/02/22 152/98         Pulse Readings from Last 3 Encounters:   01/10/23 82   12/24/22 95   12/02/22 90     @LASTSAO2(3)@    Physical Exam  Vitals reviewed  Constitutional:       General: She is not in acute distress  Appearance: She is not ill-appearing, toxic-appearing or diaphoretic  HENT:      Head: Normocephalic and atraumatic  Nose: No congestion or rhinorrhea  Mouth/Throat:      Pharynx: No oropharyngeal exudate or posterior oropharyngeal erythema  Eyes:      General: No scleral icterus  Extraocular Movements: Extraocular movements intact  Comments: Mild conjunctival pallor   Cardiovascular:      Rate and Rhythm: Normal rate and regular rhythm  Heart sounds: No murmur heard  No gallop  Pulmonary:      Effort: No respiratory distress  Breath sounds: Normal breath sounds  No wheezing, rhonchi or rales  Abdominal:      General: Bowel sounds are normal  There is no distension  Palpations: Abdomen is soft  There is no mass  Musculoskeletal:         General: No swelling, tenderness or deformity  Cervical back: Normal range of motion  Right lower leg: No edema  Left lower leg: No edema  Skin:     General: Skin is warm and dry  Capillary Refill: Capillary refill takes less than 2 seconds  Coloration: Skin is pale (mild pallor to skin noted)  Findings: No erythema, lesion or rash  Neurological:      General: No focal deficit present  Mental Status: She is alert and oriented to person, place, and time  Motor: No weakness  Gait: Gait normal    Psychiatric:         Mood and Affect: Mood normal          Judgment: Judgment normal            Goals and Barriers:  Current Goal: Prolong Survival from Cancer  Barriers: None        Patient's Capacity to Self Care:  Patient is able to self care       Emergency Contacts:    [unfilled], ,   ARACELI,SUSYESTEBAN, [phone],     Code Status: @TEA@  Advance Directive and Living Will:      Power of :    POLST:

## 2023-01-17 ENCOUNTER — OFFICE VISIT (OUTPATIENT)
Dept: HEMATOLOGY ONCOLOGY | Facility: CLINIC | Age: 53
End: 2023-01-17

## 2023-01-17 VITALS
HEART RATE: 91 BPM | WEIGHT: 208 LBS | HEIGHT: 67 IN | BODY MASS INDEX: 32.65 KG/M2 | DIASTOLIC BLOOD PRESSURE: 100 MMHG | SYSTOLIC BLOOD PRESSURE: 148 MMHG | OXYGEN SATURATION: 98 % | TEMPERATURE: 99.7 F

## 2023-01-17 DIAGNOSIS — D50.0 IRON DEFICIENCY ANEMIA DUE TO CHRONIC BLOOD LOSS: Primary | ICD-10-CM

## 2023-01-17 PROBLEM — A41.9 SEPSIS (HCC): Status: RESOLVED | Noted: 2022-12-22 | Resolved: 2023-01-17

## 2023-01-17 PROBLEM — D72.823 LEUKEMOID REACTION: Status: RESOLVED | Noted: 2017-07-10 | Resolved: 2023-01-17

## 2023-02-21 PROBLEM — A09 DIARRHEA OF INFECTIOUS ORIGIN: Status: RESOLVED | Noted: 2022-12-22 | Resolved: 2023-02-21

## 2023-02-24 ENCOUNTER — ANNUAL EXAM (OUTPATIENT)
Dept: OBGYN CLINIC | Facility: CLINIC | Age: 53
End: 2023-02-24

## 2023-02-24 VITALS
BODY MASS INDEX: 33.24 KG/M2 | DIASTOLIC BLOOD PRESSURE: 74 MMHG | SYSTOLIC BLOOD PRESSURE: 120 MMHG | HEIGHT: 67 IN | WEIGHT: 211.8 LBS

## 2023-02-24 DIAGNOSIS — Z01.419 ENCOUNTER FOR GYNECOLOGICAL EXAMINATION WITHOUT ABNORMAL FINDING: ICD-10-CM

## 2023-02-24 DIAGNOSIS — Z12.31 ENCOUNTER FOR SCREENING MAMMOGRAM FOR MALIGNANT NEOPLASM OF BREAST: Primary | ICD-10-CM

## 2023-02-24 DIAGNOSIS — N92.4 ABNORMAL PERIMENOPAUSAL BLEEDING: ICD-10-CM

## 2023-02-24 DIAGNOSIS — Z01.419 PAP SMEAR, AS PART OF ROUTINE GYNECOLOGICAL EXAMINATION: ICD-10-CM

## 2023-02-24 RX ORDER — FERROUS SULFATE 325(65) MG
1 TABLET ORAL DAILY
COMMUNITY
Start: 2023-02-01

## 2023-02-24 RX ORDER — TOBRAMYCIN AND DEXAMETHASONE 3; 1 MG/ML; MG/ML
SUSPENSION/ DROPS OPHTHALMIC
COMMUNITY
Start: 2022-12-29

## 2023-02-24 RX ORDER — BENZONATATE 200 MG/1
CAPSULE ORAL
COMMUNITY
Start: 2022-11-20

## 2023-02-24 RX ORDER — BENZONATATE 200 MG/1
200 CAPSULE ORAL 3 TIMES DAILY PRN
COMMUNITY
Start: 2022-11-20

## 2023-02-24 RX ORDER — ONDANSETRON 4 MG/1
4 TABLET, FILM COATED ORAL EVERY 8 HOURS
COMMUNITY
Start: 2022-12-29

## 2023-02-24 RX ORDER — FLUTICASONE PROPIONATE 50 MCG
SPRAY, SUSPENSION (ML) NASAL
COMMUNITY
Start: 2023-01-04

## 2023-02-24 RX ORDER — AZITHROMYCIN 500 MG/1
TABLET, FILM COATED ORAL
COMMUNITY
Start: 2022-12-28

## 2023-02-24 RX ORDER — HYDROCHLOROTHIAZIDE 12.5 MG/1
12.5 TABLET ORAL DAILY
COMMUNITY
Start: 2023-02-01

## 2023-02-24 NOTE — PROGRESS NOTES
Assessment/Plan:    No problem-specific Assessment & Plan notes found for this encounter  Diagnoses and all orders for this visit:    Encounter for screening mammogram for malignant neoplasm of breast  -     Mammo screening bilateral w 3d & cad; Future    Encounter for gynecological examination without abnormal finding  -     Thinprep Tis Pap Reflex HPV mRNA E6/E7    Pap smear, as part of routine gynecological examination    Abnormal perimenopausal bleeding    Other orders  -     azithromycin (ZITHROMAX) 500 MG tablet; TAKE 1 TABLET BY MOUTH EVERY DAY FOR 3 DAYS (Patient not taking: Reported on 2/24/2023)  -     benzonatate (TESSALON) 200 MG capsule; TAKE 1 CAPSULE BY MOUTH THREE TIMES A DAY AS NEEDED FOR COUGH (Patient not taking: Reported on 2/24/2023)  -     benzonatate (TESSALON) 200 MG capsule; Take 200 mg by mouth Three times daily as needed (Patient not taking: Reported on 2/24/2023)  -     fluticasone (FLONASE) 50 mcg/act nasal spray; USE 1 SPRAY INTO EACH NOSTRIL ONCE DAILY (Patient not taking: Reported on 2/24/2023)  -     hydrochlorothiazide (HYDRODIURIL) 12 5 mg tablet; Take 12 5 mg by mouth daily (Patient not taking: Reported on 2/24/2023)  -     ondansetron (ZOFRAN) 4 mg tablet; Take 4 mg by mouth every 8 (eight) hours (Patient not taking: Reported on 2/24/2023)  -     tobramycin-dexamethasone (TOBRADEX) ophthalmic suspension; INSTILL 1 DROP INTO BOTH EYES 3 TIMES A DAY WITHOUT CONTACTS FOR 1 WEEK (Patient not taking: Reported on 2/24/2023)  -     ferrous sulfate 325 (65 Fe) mg tablet; Take 1 tablet by mouth daily          Normal gynecological physical examination  Self-breast examination stressed  Mammogram ordered  Discussed regular exercise, healthy diet, importance of vitamin D and calcium supplements  Discussed importance of sun block use during periods of prolonged sun exposure  Patient will be seen in 1 year for routine gynecologic and medical examination    Patient will call office for any problems, concerns, or issues which may arise during the interim  Subjective:          HPI    Patient ID: Ilana Garcia is a 50 y o  female who presents today for her annual gynecologic and medical examination    Menstrual status: Patient is experiencing some irregular bleeding patterns secondary to perimenopausal change  Intermenstrual periods can be as long as 30 to 40 days  She is not experiencing any excessive bleeding or clotting however  She also denies any excessive pain or cramping  We will obtain a baseline ultrasound at this time for evaluation of the endometrial stripe  Vasomotor symptoms: Patient denies any significant vasomotor symptoms    Patient reports normal appetite    Patient reports normal bowel and bladder habits    Patient denies any significant pelvic or abdominal pain    Patient denies any headaches, chest pain, shortness of breath fever shakes or chills    Patient denies any COVID 19 symptoms including cough or loss of taste or smell    COVID vaccine status: Patient is up-to-date with COVID vaccination    Medical problems: Patient is followed for blood pressure  She also has a history of a factor V Leiden mutation  Colonoscopy status: Patient up-to-date with screening colonoscopy    Mammogram status: Patient does regular self breast examinations and is up-to-date with screening mammography  Appropriate arrangements for her annual screening mammogram were placed in the EMR system at today's visit  The following portions of the patient's history were reviewed and updated as appropriate: allergies, current medications, past family history, past medical history, past social history, past surgical history and problem list     Review of Systems   Constitutional: Negative  Negative for appetite change, diaphoresis, fatigue, fever and unexpected weight change  HENT: Negative  Eyes: Negative  Respiratory: Negative  Cardiovascular: Negative           Followed for blood pressure   Gastrointestinal: Negative  Negative for abdominal pain, blood in stool, constipation, diarrhea, nausea and vomiting  Endocrine: Negative  Negative for cold intolerance and heat intolerance  Genitourinary: Negative  Negative for dysuria, frequency, hematuria, urgency, vaginal bleeding, vaginal discharge and vaginal pain  Musculoskeletal: Negative  Skin: Negative  Allergic/Immunologic: Negative  Neurological: Negative  Hematological: Negative  Negative for adenopathy  History of factor V Leiden mutation   Psychiatric/Behavioral: Negative  Objective:      /74   Ht 5' 7" (1 702 m)   Wt 96 1 kg (211 lb 12 8 oz)   LMP 02/10/2023 (Exact Date)   BMI 33 17 kg/m²          Physical Exam  Constitutional:       General: She is not in acute distress  Appearance: Normal appearance  She is well-developed  She is not diaphoretic  HENT:      Head: Normocephalic and atraumatic  Eyes:      Pupils: Pupils are equal, round, and reactive to light  Cardiovascular:      Rate and Rhythm: Normal rate and regular rhythm  Heart sounds: Normal heart sounds  No murmur heard  No friction rub  No gallop  Pulmonary:      Effort: Pulmonary effort is normal       Breath sounds: Normal breath sounds  Chest:   Breasts:     Breasts are symmetrical       Right: No inverted nipple, mass, nipple discharge, skin change or tenderness  Left: No inverted nipple, mass, nipple discharge, skin change or tenderness  Abdominal:      General: Bowel sounds are normal       Palpations: Abdomen is soft  Genitourinary:     General: Normal vulva  Exam position: Supine  Labia:         Right: No rash or lesion  Left: No rash or lesion  Vagina: Normal  No vaginal discharge, erythema, tenderness or bleeding  Cervix: No discharge or friability  Uterus: Not enlarged and not tender  Adnexa:         Right: No mass, tenderness or fullness  Left: No mass, tenderness or fullness  Rectum: Normal  Guaiac result negative  Musculoskeletal:         General: Normal range of motion  Cervical back: Normal range of motion and neck supple  Lymphadenopathy:      Cervical: No cervical adenopathy  Upper Body:      Right upper body: No supraclavicular adenopathy  Left upper body: No supraclavicular adenopathy  Skin:     General: Skin is warm and dry  Findings: No rash  Neurological:      General: No focal deficit present  Mental Status: She is alert and oriented to person, place, and time  Psychiatric:         Mood and Affect: Mood normal          Speech: Speech normal          Behavior: Behavior normal          Thought Content:  Thought content normal          Judgment: Judgment normal

## 2023-02-26 NOTE — PATIENT INSTRUCTIONS
Normal gynecological physical examination  Self-breast examination stressed  Mammogram ordered  Discussed regular exercise, healthy diet, importance of vitamin D and calcium supplements  Discussed importance of sun block use during periods of prolonged sun exposure  Patient will be seen in 1 year for routine gynecologic and medical examination  Patient will call office for any problems, concerns, or issues which may arise during the interim      Patient scheduled for baseline pelvic ultrasound in light of the irregular perimenopausal bleeding pattern

## 2023-03-01 LAB
CLINICAL INFO: NORMAL
CYTO CVX: NORMAL
CYTOLOGY CMNT CVX/VAG CYTO-IMP: NORMAL
DATE PREVIOUS BX: NORMAL
LMP START DATE: NORMAL
SL AMB PREV. PAP:: NORMAL
SPECIMEN SOURCE CVX/VAG CYTO: NORMAL

## 2023-03-06 ENCOUNTER — ULTRASOUND (OUTPATIENT)
Dept: OBGYN CLINIC | Facility: CLINIC | Age: 53
End: 2023-03-06

## 2023-03-06 DIAGNOSIS — D21.9 FIBROID: Primary | ICD-10-CM

## 2023-03-06 DIAGNOSIS — N93.9 ABNORMAL UTERINE BLEEDING (AUB): ICD-10-CM

## 2023-03-06 NOTE — PROGRESS NOTES
Ultrasound was essentially unremarkable except for a small uterine fibroid    We will see patient back for interval follow-up in the summer    Please have her call for any problems, questions, issues or concerns which may arise for her

## 2023-03-06 NOTE — PROGRESS NOTES
AMB US Pelvic Non OB    Date/Time: 3/6/2023 11:06 AM  Performed by: Nya Cruz  Authorized by: Darnell Jones MD     Procedure details:     Indications: intermenstrual blood loss      Indications comment:  AUB    Technique:  US Pelvic, Non-OB with complete exam  Uterine findings:     Length (cm): 11 5    Height (cm):  7 1    Width (cm):  6 7    Uterine adhesions: not identified      Adnexal mass: not identified      Polyps: not identified      Myomas: identified      Endometrial stripe: identified      Endometrial hyperplasia: not identified      Endometrium thickness (mm):  5  Left ovary findings:     Left ovary:  Visualized    Cysts: not identified      Length (cm): 2 6    Height (cm): 2 1    Width (cm): 2 9  Right ovary findings:     Right ovary:  Visualized    Cysts: not identified      Length (cm): 2 9    Height (cm): 3 1    Width (cm): 2 9  Other findings:     Free pelvic fluid: not identified      Free peritoneal fluid: not identified    Post-Procedure Details:     Impression:  Endo-5mm  RT Pedunculated Fibroid-46 x 45 x 41 mm    Tolerance: Tolerated well, no immediate complications  Additional Procedure Comments:          Dr Rose Marie Rock MD  [address] [facility]  [address]  [phone]

## 2023-03-07 ENCOUNTER — TELEPHONE (OUTPATIENT)
Dept: OBGYN CLINIC | Facility: CLINIC | Age: 53
End: 2023-03-07

## 2023-05-10 ENCOUNTER — HOSPITAL ENCOUNTER (OUTPATIENT)
Dept: MAMMOGRAPHY | Facility: MEDICAL CENTER | Age: 53
Discharge: HOME | End: 2023-05-10

## 2023-05-10 VITALS — HEIGHT: 67 IN | BODY MASS INDEX: 33.12 KG/M2 | WEIGHT: 211 LBS

## 2023-05-10 DIAGNOSIS — Z12.31 ENCOUNTER FOR SCREENING MAMMOGRAM FOR MALIGNANT NEOPLASM OF BREAST: ICD-10-CM

## 2023-05-15 ENCOUNTER — TELEPHONE (OUTPATIENT)
Dept: OBGYN CLINIC | Facility: CLINIC | Age: 53
End: 2023-05-15

## 2023-05-15 NOTE — TELEPHONE ENCOUNTER
Pt had screening mgram done 5/10/2023 - (L) breast asymmetry - recom diag (L) breast mgram & diag (L) breast U/S - rad orders for same already in pt's chart  Pt informed  She will schedule appt time for same

## 2023-05-15 NOTE — TELEPHONE ENCOUNTER
----- Message from Olivia Tatum MD sent at 5/14/2023  3:31 PM EDT -----  Patient needs diagnostic mammogram and ultrasound at the current time for the left breast    Routine screening of the right breast in 1 year    Thanks

## 2023-06-20 ENCOUNTER — HOSPITAL ENCOUNTER (OUTPATIENT)
Dept: ULTRASOUND IMAGING | Facility: CLINIC | Age: 53
Discharge: HOME | End: 2023-06-20
Payer: COMMERCIAL

## 2023-06-20 ENCOUNTER — HOSPITAL ENCOUNTER (OUTPATIENT)
Dept: MAMMOGRAPHY | Facility: CLINIC | Age: 53
Discharge: HOME | End: 2023-06-20
Payer: COMMERCIAL

## 2023-06-20 VITALS — HEIGHT: 67 IN | WEIGHT: 211 LBS | BODY MASS INDEX: 33.12 KG/M2

## 2023-06-20 DIAGNOSIS — R92.8 ABNORMAL MAMMOGRAM: ICD-10-CM

## 2023-06-20 PROCEDURE — 76642 ULTRASOUND BREAST LIMITED: CPT

## 2023-06-20 PROCEDURE — G0279 TOMOSYNTHESIS, MAMMO: HCPCS

## 2023-06-20 PROCEDURE — 77065 DX MAMMO INCL CAD UNI: CPT

## 2023-06-21 NOTE — PROGRESS NOTES
Met with patient and Dr. Linda Castillo   regarding recommendation for;    _____ RIGHT ___x___LEFT      __x___Ultrasound guided  ______Stereotactic breast biopsy. __X___Verbalized understanding.       Blood thinners:  No: __x___ Yes: ______ What:                 Biopsy teaching sheet given:  Yes: ___X___ No: ________    Pt given contact information and adv to call with any questions/needs

## 2023-07-18 ENCOUNTER — HOSPITAL ENCOUNTER (OUTPATIENT)
Dept: MAMMOGRAPHY | Facility: CLINIC | Age: 53
Discharge: HOME | End: 2023-07-18
Payer: COMMERCIAL

## 2023-07-18 VITALS — HEART RATE: 75 BPM | DIASTOLIC BLOOD PRESSURE: 86 MMHG | SYSTOLIC BLOOD PRESSURE: 137 MMHG

## 2023-07-18 DIAGNOSIS — R92.8 ABNORMAL MAMMOGRAM: ICD-10-CM

## 2023-07-18 PROCEDURE — 19081 BX BREAST 1ST LESION STRTCTC: CPT

## 2023-07-18 PROCEDURE — 88305 TISSUE EXAM BY PATHOLOGIST: CPT | Performed by: STUDENT IN AN ORGANIZED HEALTH CARE EDUCATION/TRAINING PROGRAM

## 2023-07-18 PROCEDURE — 88342 IMHCHEM/IMCYTCHM 1ST ANTB: CPT | Performed by: STUDENT IN AN ORGANIZED HEALTH CARE EDUCATION/TRAINING PROGRAM

## 2023-07-18 PROCEDURE — 88341 IMHCHEM/IMCYTCHM EA ADD ANTB: CPT | Performed by: STUDENT IN AN ORGANIZED HEALTH CARE EDUCATION/TRAINING PROGRAM

## 2023-07-18 PROCEDURE — A4648 IMPLANTABLE TISSUE MARKER: HCPCS

## 2023-07-18 RX ORDER — LIDOCAINE HYDROCHLORIDE 10 MG/ML
5 INJECTION, SOLUTION EPIDURAL; INFILTRATION; INTRACAUDAL; PERINEURAL ONCE
Status: DISCONTINUED | OUTPATIENT
Start: 2023-07-18 | End: 2023-07-19 | Stop reason: HOSPADM

## 2023-07-18 RX ORDER — LIDOCAINE HYDROCHLORIDE 10 MG/ML
5 INJECTION, SOLUTION EPIDURAL; INFILTRATION; INTRACAUDAL; PERINEURAL ONCE
Status: COMPLETED | OUTPATIENT
Start: 2023-07-18 | End: 2023-07-18

## 2023-07-18 RX ORDER — LIDOCAINE HYDROCHLORIDE AND EPINEPHRINE 10; 10 MG/ML; UG/ML
10 INJECTION, SOLUTION INFILTRATION; PERINEURAL ONCE
Status: DISCONTINUED | OUTPATIENT
Start: 2023-07-18 | End: 2023-07-19 | Stop reason: HOSPADM

## 2023-07-18 RX ORDER — LIDOCAINE HYDROCHLORIDE AND EPINEPHRINE 10; 10 MG/ML; UG/ML
10 INJECTION, SOLUTION INFILTRATION; PERINEURAL ONCE
Status: COMPLETED | OUTPATIENT
Start: 2023-07-18 | End: 2023-07-18

## 2023-07-18 RX ADMIN — LIDOCAINE HYDROCHLORIDE,EPINEPHRINE BITARTRATE 10 ML: 10; .01 INJECTION, SOLUTION INFILTRATION; PERINEURAL at 10:22

## 2023-07-18 RX ADMIN — LIDOCAINE HYDROCHLORIDE 5 ML: 10 INJECTION, SOLUTION EPIDURAL; INFILTRATION; INTRACAUDAL; PERINEURAL at 10:22

## 2023-07-18 NOTE — PROGRESS NOTES
Procedure type:    _____ultrasound guided ___x__stereotactic    Breast:    ___x__Left _____Right    Location: 5 o'clock    Needle: 8G Revolve    # of passes: 8 cores ( 5 with calcs and 3 without calcs)    Clip: Mammomarhumble Tribell    Performed by: Dr. Mallorie Monzon held for 5 minutes by: Adrien Flores Strips:    ___X__yes _____no    Band aid:    __X___yes_____no    Tolerated procedure:    __X___yes _____no

## 2023-07-19 NOTE — PROGRESS NOTES
Post procedure call completed 07/19/23 @ 0932  Bleeding: _____yes __X___no    Pain: _____yes ___X___no    Redness/Swelling: ______yes ___X___no    Band aid removed: _____yes ___X__no (discussed removing when she showers)    Steri-Strips intact: ___X___yes _____no (discussed with patient to remove steri strips on . .. if they have not come off on their own)    Pt with no questions at this time, adv will call when results available, adv to call with any questions or concerns, has name/# for contact

## 2023-07-21 ENCOUNTER — TELEPHONE (OUTPATIENT)
Dept: MAMMOGRAPHY | Facility: CLINIC | Age: 53
End: 2023-07-21

## 2023-07-21 PROCEDURE — 88341 IMHCHEM/IMCYTCHM EA ADD ANTB: CPT | Performed by: STUDENT IN AN ORGANIZED HEALTH CARE EDUCATION/TRAINING PROGRAM

## 2023-07-21 PROCEDURE — 88342 IMHCHEM/IMCYTCHM 1ST ANTB: CPT | Performed by: STUDENT IN AN ORGANIZED HEALTH CARE EDUCATION/TRAINING PROGRAM

## 2023-07-21 PROCEDURE — 88305 TISSUE EXAM BY PATHOLOGIST: CPT | Performed by: STUDENT IN AN ORGANIZED HEALTH CARE EDUCATION/TRAINING PROGRAM

## 2023-07-31 ENCOUNTER — TELEPHONE (OUTPATIENT)
Dept: MAMMOGRAPHY | Facility: CLINIC | Age: 53
End: 2023-07-31

## 2023-09-06 ENCOUNTER — TELEPHONE (OUTPATIENT)
Dept: OBGYN CLINIC | Facility: CLINIC | Age: 53
End: 2023-09-06

## 2023-09-06 NOTE — TELEPHONE ENCOUNTER
Patient is calling because she will like to speak to you directly. Patient mention is regarding her mammogram and a biopsy she had done.

## 2023-09-15 ENCOUNTER — OFFICE VISIT (OUTPATIENT)
Dept: OBGYN CLINIC | Facility: CLINIC | Age: 53
End: 2023-09-15
Payer: COMMERCIAL

## 2023-09-15 ENCOUNTER — TELEPHONE (OUTPATIENT)
Dept: HEMATOLOGY ONCOLOGY | Facility: CLINIC | Age: 53
End: 2023-09-15

## 2023-09-15 VITALS
SYSTOLIC BLOOD PRESSURE: 122 MMHG | BODY MASS INDEX: 33.84 KG/M2 | DIASTOLIC BLOOD PRESSURE: 82 MMHG | HEIGHT: 67 IN | WEIGHT: 215.6 LBS

## 2023-09-15 DIAGNOSIS — Z71.9 ENCOUNTER FOR CONSULTATION: ICD-10-CM

## 2023-09-15 DIAGNOSIS — N60.92 ATYPICAL DUCTAL HYPERPLASIA OF LEFT BREAST: Primary | ICD-10-CM

## 2023-09-15 PROCEDURE — 99214 OFFICE O/P EST MOD 30 MIN: CPT | Performed by: OBSTETRICS & GYNECOLOGY

## 2023-09-15 RX ORDER — SEMAGLUTIDE 1 MG/.5ML
INJECTION, SOLUTION SUBCUTANEOUS
COMMUNITY
Start: 2023-09-04

## 2023-09-15 RX ORDER — CARBOXYMETHYLCELLULOSE SODIUM 5 MG/ML
1 SOLUTION/ DROPS OPHTHALMIC
COMMUNITY

## 2023-09-15 RX ORDER — TAZAROTENE 0.5 MG/G
GEL CUTANEOUS
COMMUNITY
Start: 2023-07-11

## 2023-09-15 RX ORDER — SEMAGLUTIDE 0.5 MG/.5ML
INJECTION, SOLUTION SUBCUTANEOUS
COMMUNITY
Start: 2023-06-29

## 2023-09-15 RX ORDER — ACYCLOVIR 50 MG/G
CREAM TOPICAL
COMMUNITY
Start: 2023-09-02

## 2023-09-15 RX ORDER — DIPHENOXYLATE HYDROCHLORIDE AND ATROPINE SULFATE 2.5; .025 MG/1; MG/1
1 TABLET ORAL EVERY EVENING
COMMUNITY

## 2023-09-15 RX ORDER — TRAMADOL HYDROCHLORIDE 50 MG/1
50 TABLET ORAL EVERY 6 HOURS PRN
COMMUNITY
Start: 2023-08-22 | End: 2024-08-21

## 2023-09-15 NOTE — TELEPHONE ENCOUNTER
Appointment Schedule   Who are you speaking with? Patient   If it is not the patient, are they listed on an active communication consent form? N/A   Which provider is the appointment scheduled with? Dr. Varghese   At which location is the appointment scheduled for? [facility]   When is the appointment scheduled? Please list date and time 9/22/23 @ 920   What is the reason for this appointment? Follow up   Did patient voice understanding of the details of this appointment? Yes   Was the no show policy reviewed with patient?  Yes

## 2023-09-18 ENCOUNTER — TELEPHONE (OUTPATIENT)
Dept: HEMATOLOGY ONCOLOGY | Facility: CLINIC | Age: 53
End: 2023-09-18

## 2023-09-18 NOTE — TELEPHONE ENCOUNTER
I called Roberto Quiroz in response to a referral that was received for patient to establish care with Surgical Oncology. Outreach was made to schedule a consultation. Patient's voicemail is full and unable to accept messages at this time. Another attempt will be made to contact patient.

## 2023-09-20 ENCOUNTER — TELEPHONE (OUTPATIENT)
Dept: OBGYN CLINIC | Facility: CLINIC | Age: 53
End: 2023-09-20

## 2023-09-20 DIAGNOSIS — N60.92 ATYPICAL DUCTAL HYPERPLASIA OF LEFT BREAST: Primary | ICD-10-CM

## 2023-09-20 NOTE — TELEPHONE ENCOUNTER
Lm patient's as re: placed order for diagnostic (L) breast mammogram in patient's chart - patient to schedule appointment for same for 2/2024.   Also placed referral for genetics consult & left phone # [address] for patient [phone]/EVA Winston MD.

## 2023-09-20 NOTE — PATIENT INSTRUCTIONS
Topic: Recent removal of atypical ductal hyperplasia and extensive discussion regarding follow-up at today's visit    I had extensive discussion with patient and her  regarding the recent surgical pathologic diagnosis of atypical ductal hyperplasia of the breast    Patient and spouse had many questions regarding follow-up treatment and whether there is role for mastectomy    I suggested that perhaps they obtain a second opinion with our surgical oncology group with Dr. Enrique Urena at [facility] and perhaps even an opinion at [facility] as well regarding various therapeutic treatment modalities. All questions were answered in detail at today's visit    I told them both to feel free to call for any problems, questions, issues or concerns which may arise for them    Mammogram   AMBULATORY CARE:   What you need to know about a mammogram:  A mammogram is an x-ray of your breasts to screen for breast cancer. Your healthcare provider will talk with you about the benefits and risks of mammograms. Together you will decide when you will get your first mammogram. This is usually at age 39 or 48. Your provider may recommend you start at 36 or younger if your risk for breast cancer is high. Mammograms usually continue every 1 to 2 years until age 76. How to prepare for a mammogram:   Do not use deodorant, powder, lotion, or perfume. These products may cause particles to appear on your mammogram.    Wear a 2-piece outfit. If your breasts are tender before your monthly period, do not have a mammogram during this time. Schedule your mammogram for 1 week after your period ends. If you are breastfeeding, express as much milk as possible before the mammogram.    Bring a list of the dates and places of your past mammograms and other breast tests or treatments.     What will happen during a mammogram:  A top view and a side view x-ray are usually done for each breast. Tell healthcare providers if you have breast implants or breast problems before you have your mammogram. You may need extra x-rays of each breast.  You will be given a hospital gown. Take off your clothes from the waist up. Wear the hospital gown so that it opens in the front. You will sit or stand next to a small x-ray machine. The healthcare provider will help you place one of your breasts on the x-ray plate. Your arm and breast will be moved until your breast is in the correct position. Your breast will be gently pressed between 2 plates for a few seconds while the x-ray is taken. This may be uncomfortable. You will be asked to hold your breath while the x-ray is taken. Another x-ray will be taken of the same breast after the position of the x-ray machine has been changed. Your other breast will be x-rayed the same way. What will happen after your mammogram:  Your breasts may feel tender for a short time after the mammogram. You may go back to your regular activities. Ask your healthcare provider when you should receive the results of your mammogram.  Risks of a mammogram:  You will be exposed to a small amount of radiation. Some breast cancers may not show up on mammograms. Call your doctor if:   You do not receive your results when expected. You have questions or concerns about the mammogram.    Follow up with your doctor as directed:  Write down your questions so you remember to ask them during your visits. © Copyright [facility] 2023 Information is for End User's use only and may not be sold, redistributed or otherwise used for commercial purposes. The above information is an  only. It is not intended as medical advice for individual conditions or treatments. Talk to your doctor, nurse or pharmacist before following any medical regimen to see if it is safe and effective for you.   And spouse

## 2023-09-20 NOTE — PROGRESS NOTES
Assessment/Plan:    No problem-specific Assessment & Plan notes found for this encounter. Diagnoses and all orders for this visit:    Atypical ductal hyperplasia of left breast  -     Ambulatory Referral to Surgical Oncology; Future    Encounter for consultation    Other orders  -     multivitamin (THERAGRAN) TABS; Take 1 tablet by mouth every evening  -     acyclovir (ZOVIRAX) 5 % cream; APPLY TO AFFECTED AREA 5 TIMES EVERY DAY  -     carboxymethylcellulose (REFRESH PLUS) 0.5 % SOLN; Apply 1 drop to eye  -     Wegovy 1 MG/0.5ML; INJECT (1MG)  BY SUBCUTANEOUS ROUTE  EVERY WEEK ON THE SAME DAY OF EACH WEEK (Patient not taking: Reported on 9/15/2023)  -     Semaglutide-Weight Management (Maged [place]) 1 MG/0.5ML; Inject under the skin  -     Wegovy 0.5 MG/0.5ML; INJECT (0.5MG)  BY SUBCUTANEOUS ROUTE  EVERY WEEK ON THE SAME DAY OF EACH WEEK (Patient not taking: Reported on 9/15/2023)  -     Tazorac 0.05 % gel; APPLY TOPICALLY TO ENTIRE FACE EVERY NIGHT BRAND NECESSARY  -     traMADol (ULTRAM) 50 mg tablet; Take 50 mg by mouth every 6 (six) hours as needed        Subjective:      Patient ID: Kellie Zavaleta is a 50 y.o. female. Patient is a 70-year-old female who presents today with her  for discussion regarding various treatment options and follow-up planning after recent surgical excision of the breast which revealed atypical ductal hyperplasia. Patient recently underwent first a needle localization of the left breast which revealed atypical ductal hyperplasia    Patient then had surgical excision of this mass which again revealed atypical ductal hyperplasia. Patient was advised to follow-up in several months with mammography. Discussion will then be held regarding findings and future follow-up planning. In addition she was told to obtain a consultation with medical oncology for suggestions regarding follow-up as well.     Both of them still feel that further treatment has not been discussed in adequate detail. I suggested that they obtain another opinion in at [facility] with surgical oncology Dr. Gwenda Osgood and also to have consultation with medical oncology Dr. Letty Rust as well. I also advised that they could also have a second opinion at [facility] as well. All questions were answered in detail for them during today's visit    They were both told to feel free to call for any problems, questions, issues or concerns which may arise for them. Total time of today's visit was 30 minutes of which greater than 50% was spent face-to-face counseling the patient as well as coordination of care, review of chart and lab values, physical examination as well as computer entry into the [employer] medical record system. The following portions of the patient's history were reviewed and updated as appropriate: allergies, current medications, past family history, past medical history, past social history, past surgical history and problem list.    Review of Systems   Constitutional: Negative. Negative for appetite change, diaphoresis, fatigue, fever and unexpected weight change. HENT: Negative. Eyes: Negative. Respiratory: Negative. Cardiovascular: Negative. Followed for blood pressure   Gastrointestinal: Negative. Negative for abdominal pain, blood in stool, constipation, diarrhea, nausea and vomiting. Endocrine: Negative. Negative for cold intolerance and heat intolerance. Genitourinary: Negative. Negative for dysuria, frequency, hematuria, urgency, vaginal bleeding, vaginal discharge and vaginal pain. Musculoskeletal: Negative. Skin: Negative. Allergic/Immunologic: Negative. Neurological: Negative. Hematological: Negative. Negative for adenopathy. Psychiatric/Behavioral: Negative.           Objective:      /82   Ht 5' 7" (1.702 m)   Wt 97.8 kg (215 lb 9.6 oz)   LMP 08/05/2023 (Exact Date)   BMI 33.77 kg/m²          Physical Exam  Constitutional: Appearance: She is well-developed. HENT:      Head: Normocephalic. Eyes:      Pupils: Pupils are equal, round, and reactive to light. Cardiovascular:      Rate and Rhythm: Normal rate. Pulmonary:      Effort: Pulmonary effort is normal.   Musculoskeletal:         General: Normal range of motion. Cervical back: Normal range of motion and neck supple. Skin:     General: Skin is warm and dry. Neurological:      General: No focal deficit present. Mental Status: She is alert and oriented to person, place, and time. Psychiatric:         Mood and Affect: Mood normal.         Behavior: Behavior normal.         Thought Content:  Thought content normal.         Judgment: Judgment normal.

## 2023-09-22 ENCOUNTER — OFFICE VISIT (OUTPATIENT)
Dept: HEMATOLOGY ONCOLOGY | Facility: CLINIC | Age: 53
End: 2023-09-22
Payer: COMMERCIAL

## 2023-09-22 ENCOUNTER — TELEPHONE (OUTPATIENT)
Dept: HEMATOLOGY ONCOLOGY | Facility: CLINIC | Age: 53
End: 2023-09-22

## 2023-09-22 VITALS
OXYGEN SATURATION: 97 % | BODY MASS INDEX: 33.04 KG/M2 | SYSTOLIC BLOOD PRESSURE: 118 MMHG | DIASTOLIC BLOOD PRESSURE: 82 MMHG | RESPIRATION RATE: 15 BRPM | WEIGHT: 210.5 LBS | HEIGHT: 67 IN | HEART RATE: 81 BPM | TEMPERATURE: 97.5 F

## 2023-09-22 DIAGNOSIS — D50.9 IRON DEFICIENCY ANEMIA, UNSPECIFIED IRON DEFICIENCY ANEMIA TYPE: Primary | ICD-10-CM

## 2023-09-22 DIAGNOSIS — D68.51 FACTOR V LEIDEN MUTATION (HCC): ICD-10-CM

## 2023-09-22 DIAGNOSIS — N60.92 ATYPICAL HYPERPLASIA OF LEFT BREAST: ICD-10-CM

## 2023-09-22 PROCEDURE — 99215 OFFICE O/P EST HI 40 MIN: CPT | Performed by: INTERNAL MEDICINE

## 2023-09-22 RX ORDER — SODIUM CHLORIDE 9 MG/ML
20 INJECTION, SOLUTION INTRAVENOUS ONCE
OUTPATIENT
Start: 2023-10-02

## 2023-09-22 NOTE — PROGRESS NOTES
Hematology Outpatient Follow - Up Note  Skylar Briceño 50 y.o. female MRN: @ Encounter: [id number]        Date:  9/22/2023        Assessment/ Plan:    She is 82-year-old  female with factor V Leiden mutation, heterozygous, anxiety, was diagnosed with abnormal screening mammogram with calcification of the left breast at 5:00 direction biopsy showed atypical ductal hyperplasia, no evidence of invasive component confirmed by excisional biopsy    Extensive maternal history for breast cancer, and her brother diagnosed with thyroid cancer    Patient to follow-up with surgical breast oncology every 6 months for MRI with possible alternating with 3D mammogram    Regarding prophylactic measures, NCCN guideline suggested tamoxifen in premenopausal woman, Evista for total of 5 years  AI in postmenopausal for a total of 5 years    We will make informative decision regarding close observation versus prophylactic hormonal therapy after evaluation by genetic counselor and the results of the germline tests    Iron deficiency anemia Singleterry to menorrhagia Venofer 300 g IV x4 doses            Labs and imaging studies are reviewed by ordering provider once results are available. If there are findings that need immediate attention, you will be contacted when results available. Discussing results and the implication on your healthcare is best discussed in person at your follow-up visit.        HPI: Atypical ductal hyperplasia of the left breast, iron deficiency anemia secondary to menorrhagia, factor V Leiden mutation, heterozygous    82-year-old premenopausal with menorrhagia seen in the past for iron deficiency anemia she could not tolerate oral iron pills because of nausea, cramps    She had an abnormal screening mammogram which led to stereotactic biopsy of the left breast biopsy showed atypical ductal hyperplasia no evidence of invasive carcinoma in July 18, 2023 after calcification was seen on mammogram or the left breast at 5:00 direction    Status post excisional biopsy on 9/5/2023 confirming atypical ductal hyperplasia with multiple foci of ADH in the background of extensive columnar cell hyperplasia    Extensive family history of cancer with thyroid cancer in her brother, breast cancer in her maternal aunt, breast cancer in maternal grandmother, breast cancer in maternal cousin    Interval History:        Previous Treatment:         Test Results:    Imaging: No results found. Labs:   Lab Results   Component Value Date    WBC 5.75 12/26/2022    HGB 8.5 (L) 12/26/2022    HCT 28.1 (L) 12/26/2022    MCV 79 (L) 12/26/2022     12/26/2022     Lab Results   Component Value Date    K 3.3 (L) 12/24/2022     (H) 12/24/2022    CO2 20 (L) 12/24/2022    BUN 6 12/24/2022    CREATININE 0.86 12/24/2022    GLUF 112 (H) 12/23/2022    CALCIUM 8.1 (L) 12/24/2022    CORRECTEDCA 9.3 12/22/2022    AST 19 12/22/2022    ALT 30 12/22/2022    ALKPHOS 79 12/22/2022    EGFR 79 12/24/2022       Lab Results   Component Value Date    IRON 26 (L) 12/22/2022    TIBC 468 (H) 12/22/2022    FERRITIN 5 (L) 12/22/2022       No results found for: "EXVCZCJU04"      ROS: Review of Systems   Constitutional: Negative for appetite change, chills, diaphoresis, fatigue and unexpected weight change. HENT:   Negative for mouth sores, nosebleeds, sore throat, trouble swallowing and voice change. Eyes: Negative for eye problems and icterus. Respiratory: Negative for chest tightness, cough, hemoptysis and wheezing. Cardiovascular: Negative for chest pain, leg swelling and palpitations. Gastrointestinal: Negative for abdominal distention, abdominal pain, blood in stool, constipation, diarrhea, nausea and vomiting. Endocrine: Negative for hot flashes. Genitourinary: Negative for bladder incontinence, difficulty urinating, dyspareunia, dysuria and frequency.     Musculoskeletal: Negative for arthralgias, back pain, gait problem, neck pain and neck stiffness. Skin: Negative for itching and rash. Neurological: Negative for dizziness, gait problem, headaches, numbness, seizures and speech difficulty. Hematological: Negative for adenopathy. Does not bruise/bleed easily. Psychiatric/Behavioral: Negative for decreased concentration, depression, sleep disturbance and suicidal ideas. The patient is not nervous/anxious. Current Medications: Reviewed  Allergies: Reviewed  PMH/FH/SH:  Reviewed      Physical Exam:    Body surface area is 2.07 meters squared. Wt Readings from Last 3 Encounters:   09/22/23 95.5 kg (210 lb 8 oz)   09/15/23 97.8 kg (215 lb 9.6 oz)   06/20/23 95.7 kg (211 lb)        Temp Readings from Last 3 Encounters:   09/22/23 97.5 °F (36.4 °C) (Temporal)   01/17/23 99.7 °F (37.6 °C) (Temporal)   01/10/23 97.9 °F (36.6 °C) (Temporal)        BP Readings from Last 3 Encounters:   09/22/23 118/82   09/15/23 122/82   07/18/23 137/86         Pulse Readings from Last 3 Encounters:   09/22/23 81   07/18/23 75   01/17/23 91        Physical Exam  Vitals reviewed. Constitutional:       General: She is not in acute distress. Appearance: She is well-developed. She is not diaphoretic. HENT:      Head: Normocephalic and atraumatic. Eyes:      Conjunctiva/sclera: Conjunctivae normal.   Neck:      Trachea: No tracheal deviation. Cardiovascular:      Rate and Rhythm: Normal rate and regular rhythm. Heart sounds: No murmur heard. No friction rub. No gallop. Pulmonary:      Effort: Pulmonary effort is normal. No respiratory distress. Breath sounds: Normal breath sounds. No wheezing or rales. Chest:      Chest wall: No tenderness. Abdominal:      General: There is no distension. Palpations: Abdomen is soft. Tenderness: There is no abdominal tenderness. Musculoskeletal:      Cervical back: Normal range of motion and neck supple. Right lower leg: No edema. Left lower leg: No edema. Lymphadenopathy:      Cervical: No cervical adenopathy. Skin:     General: Skin is warm and dry. Coloration: Skin is not pale. Findings: No erythema. Neurological:      Mental Status: She is alert and oriented to person, place, and time. Psychiatric:         Behavior: Behavior normal.         Thought Content: Thought content normal.         Judgment: Judgment normal.         ECO    Goals and Barriers:  Current Goal: Minimize effects of disease. Barriers: None. Patient's Capacity to Self Care:  Patient is able to self care.     Code Status: @COCritical access hospitalUS@

## 2023-09-22 NOTE — TELEPHONE ENCOUNTER
While we try to accommodate patient requests, our priority is to schedule treatment according to Doctor's orders and site availability. Does the Provider use the intake sheet or checkout note? What would be a preferred day of the week that would work best for your infusion appointment? Monday   Do you prefer mornings or afternoons for your appointments? Afternoons 230. Are there any days or dates that do not work for your schedule, including any upcoming vacations? We are going to try our best to schedule you at the infusion center closest to your home. In the event that we are unable to what would be your next preferred infusion site or sites? 1. [place]   2. Do you have transportation to take you to all of your appointments?  Yes

## 2023-09-25 ENCOUNTER — TELEPHONE (OUTPATIENT)
Dept: HEMATOLOGY ONCOLOGY | Facility: CLINIC | Age: 53
End: 2023-09-25

## 2023-09-25 NOTE — TELEPHONE ENCOUNTER
I spoke with Raphael Acosta to schedule their consultation with Cancer Risk and Genetics. Scheduling Outcome: Patient is scheduled for an appointment on 03/08/2024 at [facility] with Dasha Winston     Personal/Family History Related to Appointment:     Personal History of Cancer: Patient reports no personal history of cancer    Family History of Cancer: Patient reports family history of breast ca- [place] aunt, [place] cousin, [place] grandmother. thyroid ca- [place] brother. Is patient of [address] [place] [address] [address]?: No    History of Genetic Testing:  Personal History of Genetic Testing: Patient report no personal history of Genetic Testing. Family History of Genetic Testing: Patient reports that no family members have had Genetic Testing. Progeny:  Is patient able to complete our family history questionnaire on a computer?:  Yes    Patient's preferred e-mail address: [email or web address]

## 2023-10-02 ENCOUNTER — DOCUMENTATION (OUTPATIENT)
Dept: HEMATOLOGY ONCOLOGY | Facility: CLINIC | Age: 53
End: 2023-10-02

## 2023-10-02 ENCOUNTER — TELEPHONE (OUTPATIENT)
Dept: HEMATOLOGY ONCOLOGY | Facility: CLINIC | Age: 53
End: 2023-10-02

## 2023-10-02 ENCOUNTER — HOSPITAL ENCOUNTER (OUTPATIENT)
Dept: INFUSION CENTER | Facility: CLINIC | Age: 53
Discharge: HOME | End: 2023-10-02
Payer: COMMERCIAL

## 2023-10-02 ENCOUNTER — TELEPHONE (OUTPATIENT)
Dept: OTHER | Facility: OTHER | Age: 53
End: 2023-10-02

## 2023-10-02 VITALS
DIASTOLIC BLOOD PRESSURE: 83 MMHG | TEMPERATURE: 98.5 F | HEART RATE: 86 BPM | SYSTOLIC BLOOD PRESSURE: 129 MMHG | RESPIRATION RATE: 18 BRPM

## 2023-10-02 DIAGNOSIS — D68.51 FACTOR V LEIDEN MUTATION (HCC): Primary | ICD-10-CM

## 2023-10-02 DIAGNOSIS — D50.9 IRON DEFICIENCY ANEMIA, UNSPECIFIED IRON DEFICIENCY ANEMIA TYPE: ICD-10-CM

## 2023-10-02 PROCEDURE — 96365 THER/PROPH/DIAG IV INF INIT: CPT

## 2023-10-02 RX ORDER — SODIUM CHLORIDE 9 MG/ML
20 INJECTION, SOLUTION INTRAVENOUS ONCE
Status: COMPLETED | OUTPATIENT
Start: 2023-10-02 | End: 2023-10-02

## 2023-10-02 RX ORDER — SODIUM CHLORIDE 9 MG/ML
20 INJECTION, SOLUTION INTRAVENOUS ONCE
OUTPATIENT
Start: 2023-10-09

## 2023-10-02 RX ADMIN — SODIUM CHLORIDE 20 ML/HR: 0.9 INJECTION, SOLUTION INTRAVENOUS at 15:03

## 2023-10-02 RX ADMIN — IRON SUCROSE 300 MG: 20 INJECTION, SOLUTION INTRAVENOUS at 15:04

## 2023-10-02 NOTE — TELEPHONE ENCOUNTER
Patient received IV venofer for the first time. I called the patient to inquire how she was doing. She reports that she is doing well. She was a bit concerned about one episode of diahrea and mild chest tightness on standing which resolves with laying down and resting. She denies any fever, chills, nausea, vomiting, dizziness, chest pain or SOB. I explained to the patient to monitor symptoms and if worsens should go to ER. She can take benadryl and see if it helps. Patient sounded well over the phone. No signs of distress noted.

## 2023-10-02 NOTE — PROGRESS NOTES
Patient tolerated first Venofer without complication. Intake assessment completed. AVS declined, aware of next appointment, patient left unit in stable condition.

## 2023-10-02 NOTE — TELEPHONE ENCOUNTER
[phone]/ Patient Evelyn Ita Briscoe/  1970/ Had iron infusion today and now has chest tightness and diarrhea     VIA TT

## 2023-10-02 NOTE — PROGRESS NOTES
Oncology Finance Advocacy Intake and Intervention  Oncology Finance Counselor/Advocate placed call to patient. This writer informed patient that this writer is here to assist patient with billing questions, financial assistance, payment/payment plans, quotes, copayment assistance, insurance optimization, and insurance navigation.    This writer conducted a thorough benefit review of copayment, deductible, and out of pocket cost. This information is documented below and has been reviewed with patient.     Copayment: N/A  Deductible: $1000 MET -0- REMAINING $1000  Out of Pocket Cost: $2000 MET $398.66 REMAINING $1601.34  Insurance optimization (Limited benefit vs self-pay): N/A  Patient assistance status: N/A  Free Drug Applications: N/A  Interventions: N/A  Added to careteam  Called pt to go over her ins benefits got her voicemail  Left a message for pt to call me back        Information above was review thoroughly with patient and patient was advise of possible assistance programs/interventions. If any question arise patient can contact this writer at below information. This information was given to patient at time of contact.      Naomi Reza  Phone:[phone]  Email: [email or web address]

## 2023-10-06 RX ORDER — ACETAMINOPHEN 325 MG/1
650 TABLET ORAL ONCE
Start: 2023-10-09 | End: 2023-10-06

## 2023-10-06 NOTE — PROGRESS NOTES
Reviewed telephone call from Dr. Temo Lion on 10/02/203 with Dr. Carolee Mccarty. Per Dr. Carolee Mccarty okay to re challenge Venofer, will add oral tylenol as pre medication.

## 2023-10-06 NOTE — ADDENDUM NOTE
"-- DO NOT REPLY / DO NOT REPLY ALL --  -- Message is from the [employer]--    COVID-19 Universal Screening: Negative    General Patient Message      Reason for Call: Georjean Felty called from [facility] in regards to the patient plan of care that she has been following up on since march, please call doug in regards to this. Caller Information       Type Contact Phone    05/28/2020 11:11 AM Phone (Incoming) Georjean Felty  (Other) [phone]          Alternative phone number:n/a     Turnaround time given to caller: ""This message will be sent to [facility] Provider's name]. The clinical team will fulfill your request as soon as they review your message. \""    " Encounter addended by: Leonel Osborn, Pharmacist on: 10/6/2023 3:25 PM   Actions taken: i-Jon created or edited

## 2023-10-09 ENCOUNTER — TELEPHONE (OUTPATIENT)
Dept: HEMATOLOGY ONCOLOGY | Facility: CLINIC | Age: 53
End: 2023-10-09

## 2023-10-10 DIAGNOSIS — D50.9 IRON DEFICIENCY ANEMIA, UNSPECIFIED IRON DEFICIENCY ANEMIA TYPE: Primary | ICD-10-CM

## 2023-10-11 ENCOUNTER — DOCUMENTATION (OUTPATIENT)
Dept: HEMATOLOGY ONCOLOGY | Facility: CLINIC | Age: 53
End: 2023-10-11

## 2023-10-11 NOTE — PROGRESS NOTES
2ND ATTEMPT   Called pt to go over her ins benefits  Got her voicemail oleft a message for pt to call me back

## 2023-10-16 ENCOUNTER — DOCUMENTATION (OUTPATIENT)
Dept: HEMATOLOGY ONCOLOGY | Facility: CLINIC | Age: 53
End: 2023-10-16

## 2023-10-16 NOTE — PROGRESS NOTES
3RD ATTEMPT  Called pt to go over her ins benefits  Got her voicemail. Left a message for pt to call me back

## 2023-10-20 ENCOUNTER — TELEPHONE (OUTPATIENT)
Dept: HEMATOLOGY ONCOLOGY | Facility: CLINIC | Age: 53
End: 2023-10-20

## 2023-10-20 NOTE — TELEPHONE ENCOUNTER
Patient Call    Who are you speaking with? Patient    If it is not the patient, are they listed on an active communication consent form? Yes   What is the reason for this call? Need referral of genetics sent to [facility]   Does this require a call back? No   If a call back is required, please list best call back number [phone] to patient   If a call back is required, advise that a message will be forwarded to their care team and someone will return their call as soon as possible. Did you relay this information to the patient?  Yes

## 2023-10-20 NOTE — TELEPHONE ENCOUNTER
Appointment Change  Cancel, Reschedule, Change to Virtual      Who are you speaking with? Patient   If it is not the patient, is the caller listed on the communication consent form? Yes   Which provider is the appointment scheduled with? Tho Amador   When was the original appointment scheduled? Please list date and time 3/8/2024 9am   At which location is the appointment scheduled to take place? [facility]   Was the appointment rescheduled? Was the appointment changed from an in person visit to a virtual visit? If so, please list the details of the change. no   What is the reason for the appointment change? Sooner at [facility]       Was STAR transport scheduled? No   Does STAR transport need to be scheduled for the new visit (if applicable) No   Does the patient need an infusion appointment rescheduled? No   Does the patient have an upcoming infusion appointment scheduled? If so, when? No   Is the patient undergoing chemotherapy? No   For appointments cancelled with less than 24 hours:  Was the no-show policy reviewed?  Yes

## 2023-10-23 ENCOUNTER — APPOINTMENT (OUTPATIENT)
Dept: LAB | Facility: MEDICAL CENTER | Age: 53
End: 2023-10-23
Payer: COMMERCIAL

## 2023-10-23 DIAGNOSIS — D50.9 IRON DEFICIENCY ANEMIA, UNSPECIFIED IRON DEFICIENCY ANEMIA TYPE: ICD-10-CM

## 2023-10-23 LAB
BASOPHILS # BLD AUTO: 0.02 THOUSANDS/ÂΜL (ref 0–0.1)
BASOPHILS NFR BLD AUTO: 0 % (ref 0–1)
EOSINOPHIL # BLD AUTO: 0.12 THOUSAND/ÂΜL (ref 0–0.61)
EOSINOPHIL NFR BLD AUTO: 2 % (ref 0–6)
ERYTHROCYTE [DISTWIDTH] IN BLOOD BY AUTOMATED COUNT: 17.9 % (ref 11.6–15.1)
HCT VFR BLD AUTO: 37.1 % (ref 34.8–46.1)
HGB BLD-MCNC: 11.4 G/DL (ref 11.5–15.4)
IMM GRANULOCYTES # BLD AUTO: 0.01 THOUSAND/UL (ref 0–0.2)
IMM GRANULOCYTES NFR BLD AUTO: 0 % (ref 0–2)
LYMPHOCYTES # BLD AUTO: 1.68 THOUSANDS/ÂΜL (ref 0.6–4.47)
LYMPHOCYTES NFR BLD AUTO: 32 % (ref 14–44)
MCH RBC QN AUTO: 27.9 PG (ref 26.8–34.3)
MCHC RBC AUTO-ENTMCNC: 30.7 G/DL (ref 31.4–37.4)
MCV RBC AUTO: 91 FL (ref 82–98)
MONOCYTES # BLD AUTO: 0.53 THOUSAND/ÂΜL (ref 0.17–1.22)
MONOCYTES NFR BLD AUTO: 10 % (ref 4–12)
NEUTROPHILS # BLD AUTO: 2.94 THOUSANDS/ÂΜL (ref 1.85–7.62)
NEUTS SEG NFR BLD AUTO: 56 % (ref 43–75)
NRBC BLD AUTO-RTO: 0 /100 WBCS
PLATELET # BLD AUTO: 316 THOUSANDS/UL (ref 149–390)
PMV BLD AUTO: 11.6 FL (ref 8.9–12.7)
RBC # BLD AUTO: 4.08 MILLION/UL (ref 3.81–5.12)
WBC # BLD AUTO: 5.3 THOUSAND/UL (ref 4.31–10.16)

## 2023-10-23 PROCEDURE — 36415 COLL VENOUS BLD VENIPUNCTURE: CPT

## 2023-10-23 PROCEDURE — 85025 COMPLETE CBC W/AUTO DIFF WBC: CPT

## 2023-10-23 NOTE — TELEPHONE ENCOUNTER
Fax is not able to be sent to number given. Tried calling patient, was not able to leave message due to mailbox being full. Sent [employer] message asking best way to get this referral to her.

## 2023-11-13 ENCOUNTER — HOSPITAL ENCOUNTER (EMERGENCY)
Facility: HOSPITAL | Age: 53
Discharge: HOME | End: 2023-11-13
Attending: EMERGENCY MEDICINE
Payer: COMMERCIAL

## 2023-11-13 VITALS
OXYGEN SATURATION: 98 % | SYSTOLIC BLOOD PRESSURE: 129 MMHG | DIASTOLIC BLOOD PRESSURE: 102 MMHG | RESPIRATION RATE: 20 BRPM | HEART RATE: 94 BPM | TEMPERATURE: 98.6 F

## 2023-11-13 DIAGNOSIS — R53.1 WEAKNESS: Primary | ICD-10-CM

## 2023-11-13 LAB
ANION GAP SERPL CALCULATED.3IONS-SCNC: 7 MMOL/L
BASOPHILS # BLD AUTO: 0.01 THOUSANDS/ÂΜL (ref 0–0.1)
BASOPHILS NFR BLD AUTO: 0 % (ref 0–1)
BUN SERPL-MCNC: 8 MG/DL (ref 5–25)
CALCIUM SERPL-MCNC: 8.8 MG/DL (ref 8.4–10.2)
CHLORIDE SERPL-SCNC: 105 MMOL/L (ref 96–108)
CK SERPL-CCNC: 193 U/L (ref 26–192)
CO2 SERPL-SCNC: 26 MMOL/L (ref 21–32)
CREAT SERPL-MCNC: 0.82 MG/DL (ref 0.6–1.3)
EOSINOPHIL # BLD AUTO: 0.04 THOUSAND/ÂΜL (ref 0–0.61)
EOSINOPHIL NFR BLD AUTO: 1 % (ref 0–6)
ERYTHROCYTE [DISTWIDTH] IN BLOOD BY AUTOMATED COUNT: 16.6 % (ref 11.6–15.1)
FOLATE SERPL-MCNC: 19.3 NG/ML
GFR SERPL CREATININE-BSD FRML MDRD: 83 ML/MIN/1.73SQ M
GLUCOSE SERPL-MCNC: 91 MG/DL (ref 65–140)
HCT VFR BLD AUTO: 36.4 % (ref 34.8–46.1)
HGB BLD-MCNC: 12 G/DL (ref 11.5–15.4)
IMM GRANULOCYTES # BLD AUTO: 0.02 THOUSAND/UL (ref 0–0.2)
IMM GRANULOCYTES NFR BLD AUTO: 1 % (ref 0–2)
LYMPHOCYTES # BLD AUTO: 0.98 THOUSANDS/ÂΜL (ref 0.6–4.47)
LYMPHOCYTES NFR BLD AUTO: 23 % (ref 14–44)
MCH RBC QN AUTO: 29.6 PG (ref 26.8–34.3)
MCHC RBC AUTO-ENTMCNC: 33 G/DL (ref 31.4–37.4)
MCV RBC AUTO: 90 FL (ref 82–98)
MONOCYTES # BLD AUTO: 0.41 THOUSAND/ÂΜL (ref 0.17–1.22)
MONOCYTES NFR BLD AUTO: 10 % (ref 4–12)
NEUTROPHILS # BLD AUTO: 2.86 THOUSANDS/ÂΜL (ref 1.85–7.62)
NEUTS SEG NFR BLD AUTO: 65 % (ref 43–75)
NRBC BLD AUTO-RTO: 0 /100 WBCS
PLATELET # BLD AUTO: 260 THOUSANDS/UL (ref 149–390)
PMV BLD AUTO: 11.2 FL (ref 8.9–12.7)
POTASSIUM SERPL-SCNC: 3.5 MMOL/L (ref 3.5–5.3)
RBC # BLD AUTO: 4.05 MILLION/UL (ref 3.81–5.12)
SODIUM SERPL-SCNC: 138 MMOL/L (ref 135–147)
VIT B12 SERPL-MCNC: 213 PG/ML (ref 180–914)
WBC # BLD AUTO: 4.32 THOUSAND/UL (ref 4.31–10.16)

## 2023-11-13 PROCEDURE — 80048 BASIC METABOLIC PNL TOTAL CA: CPT

## 2023-11-13 PROCEDURE — 99284 EMERGENCY DEPT VISIT MOD MDM: CPT | Performed by: EMERGENCY MEDICINE

## 2023-11-13 PROCEDURE — 82550 ASSAY OF CK (CPK): CPT

## 2023-11-13 PROCEDURE — 82607 VITAMIN B-12: CPT

## 2023-11-13 PROCEDURE — 85025 COMPLETE CBC W/AUTO DIFF WBC: CPT

## 2023-11-13 PROCEDURE — 36415 COLL VENOUS BLD VENIPUNCTURE: CPT

## 2023-11-13 PROCEDURE — 96360 HYDRATION IV INFUSION INIT: CPT

## 2023-11-13 PROCEDURE — 99285 EMERGENCY DEPT VISIT HI MDM: CPT

## 2023-11-13 PROCEDURE — 82746 ASSAY OF FOLIC ACID SERUM: CPT

## 2023-11-13 RX ORDER — ACETAMINOPHEN 325 MG/1
650 TABLET ORAL ONCE
Status: DISCONTINUED | OUTPATIENT
Start: 2023-11-13 | End: 2023-11-13 | Stop reason: HOSPADM

## 2023-11-13 RX ADMIN — SODIUM CHLORIDE 1000 ML: 0.9 INJECTION, SOLUTION INTRAVENOUS at 18:27

## 2023-11-13 NOTE — ED PROVIDER NOTES
History  Chief Complaint   Patient presents with    Extremity Weakness     Pt states while standing in shower, she lost feeling in her legs, below her knees. Pt did not fall, states she "just felt like she couldn't walk". Pt denies pain in legs, and denies any other symptoms. Pt is ambulatory at this time with no difficulty. 28-year-old female with past medical history of factor V Leiden, anemia, presents to the emergency department complaining of bilateral leg weakness. Patient is currently experiencing COVID infection which started approximately 1 week ago. Patient was in the shoulder when she noticed some loss of sensation in bilateral legs below her knees. This sensation returned back to normal after about 10 minutes. Patient does mention a history of degenerative disc disease, lumbar spinal surgery L2-L5 following a fracture as a child. She never had this sensation before. Patient was concerned that she was having a stroke. Her diet is overall poor and not balance. She does take iron supplements. Her symptoms did resolve prior to arrival in the emergency department. Even while patient had the symptoms she was still able to ambulate however felt diminished sensation in her legs. Prior to Admission Medications   Prescriptions Last Dose Informant Patient Reported? Taking?    Semaglutide-Weight Management (WEGOVY) 1 MG/0.5ML  Self Yes No   Sig: Inject under the skin   Tazorac 0.05 % gel  Self Yes No   Sig: APPLY TOPICALLY TO ENTIRE FACE EVERY NIGHT BRAND NECESSARY   acyclovir (ZOVIRAX) 5 % cream  Self Yes No   Sig: APPLY TO AFFECTED AREA 5 TIMES EVERY DAY   carboxymethylcellulose (REFRESH PLUS) 0.5 % SOLN  Self Yes No   Sig: Apply 1 drop to eye   cycloSPORINE (RESTASIS) 0.05 % ophthalmic emulsion  Self Yes No   Sig: Administer 1 drop to both eyes every 12 (twelve) hours   famotidine (PEPCID) 40 MG tablet  Self No No   Sig: Take 1 tablet (40 mg total) by mouth 2 (two) times a day   ferrous sulfate 324 (65 Fe) mg  Self No No   Sig: Take 1 tablet (324 mg total) by mouth daily before breakfast Do not start before 2022. Patient not taking: Reported on 10/2/2023   hydrochlorothiazide (HYDRODIURIL) 12.5 mg tablet  Self Yes No   Sig: Take 12.5 mg by mouth daily   Patient not taking: Reported on 10/2/2023   metoprolol succinate (TOPROL-XL) 50 mg 24 hr tablet  Self Yes No   Sig: Take 50 mg by mouth daily   multivitamin (THERAGRAN) TABS  Self Yes No   Sig: Take 1 tablet by mouth every evening   Patient not taking: Reported on 10/2/2023   sertraline (ZOLOFT) 50 mg tablet  Self Yes No   Sig: daily   tobramycin-dexamethasone (TOBRADEX) ophthalmic suspension  Self Yes No   Patient not taking: Reported on 10/2/2023      Facility-Administered Medications: None       Past Medical History:   Diagnosis Date    Bulging lumbar disc     L1-L2    Endometrial hyperplasia     endometrial polyp    Factor V deficiency ([address]) 2005    Factor V Leiden ([address])     GERD (gastroesophageal reflux disease)     Hydrops     eye    RSD lower limb        Past Surgical History:   Procedure Laterality Date     SECTION      EGD  2020    Dr. Donell Chavarria. Symptoms of reflux after antibiotics. Biopsies negative for H. pylori, negative eosinophilic esophagitis, positive for reflux esophagitis.     FOOT SURGERY      MAMMO STEREOTACTIC BREAST BIOPSY LEFT (ALL INC) Left 2023    MOUTH SURGERY      NV EXC TUMOR SOFT TISS FACE&SCALP SUBFASCIAL <2CM N/A 2017    Procedure: EXCISION FOREHEAD MASS;  Surgeon: Medhat Becerril MD;  Location:  MAIN OR;  Service: Plastics    TUBAL LIGATION         Family History   Problem Relation Age of Onset    Colon polyps Mother     Aneurysm Father     Hypertension Father     No Known Problems Daughter     Breast cancer Maternal Grandmother         unsure what age    No Known Problems Maternal Grandfather     No Known Problems Paternal Grandmother     No Known Problems Paternal Grandfather     Thyroid cancer Brother     Breast cancer Maternal Aunt 76        mothers twin sister    No Known Problems Maternal Aunt     No Known Problems Maternal Aunt     No Known Problems Maternal Aunt     Breast cancer Other 39        maternal    Colon cancer Neg Hx     Endometrial cancer Neg Hx     Ovarian cancer Neg Hx      I have reviewed and agree with the history as documented. E-Cigarette/Vaping    E-Cigarette Use Never User      E-Cigarette/Vaping Substances    Nicotine No     THC No     CBD No     Flavoring No     Other No     Unknown No      Social History     Tobacco Use    Smoking status: Former     Years: 3.00     Types: Cigarettes     Quit date:      Years since quittin.8    Smokeless tobacco: Never   Vaping Use    Vaping Use: Never used   Substance Use Topics    Alcohol use: Not Currently     Comment: rarely    Drug use: No        Review of Systems   All other systems reviewed and are negative. Physical Exam  ED Triage Vitals [23 1632]   Temperature Pulse Respirations Blood Pressure SpO2   98.6 °F (37 °C) 94 20 (!) 129/102 98 %      Temp Source Heart Rate Source Patient Position - Orthostatic VS BP Location FiO2 (%)   Oral Monitor Sitting Left arm --      Pain Score       --             Orthostatic Vital Signs  Vitals:    23 1632   BP: (!) 129/102   Pulse: 94   Patient Position - Orthostatic VS: Sitting       Physical Exam  Vitals and nursing note reviewed. Constitutional:       General: She is not in acute distress. Appearance: She is well-developed. HENT:      Head: Normocephalic and atraumatic. Eyes:      Conjunctiva/sclera: Conjunctivae normal.   Cardiovascular:      Rate and Rhythm: Normal rate and regular rhythm. Heart sounds: No murmur heard. Pulmonary:      Effort: Pulmonary effort is normal. No respiratory distress. Breath sounds: Normal breath sounds. Abdominal:      Palpations: Abdomen is soft. Tenderness:  There is no abdominal tenderness. Musculoskeletal:         General: No swelling. Cervical back: Neck supple. Skin:     General: Skin is warm and dry. Capillary Refill: Capillary refill takes less than 2 seconds. Neurological:      General: No focal deficit present. Mental Status: She is alert and oriented to person, place, and time. Mental status is at baseline. GCS: GCS eye subscore is 4. GCS verbal subscore is 5. GCS motor subscore is 6. Cranial Nerves: No cranial nerve deficit, dysarthria or facial asymmetry. Sensory: No sensory deficit. Motor: No weakness or tremor. Coordination: Coordination is intact. Romberg sign negative. Coordination normal. Finger-Nose-Finger Test and Heel to [facility] Test normal.      Gait: Gait is intact. Gait normal.      Deep Tendon Reflexes:      Reflex Scores:       Patellar reflexes are 2+ on the right side and 2+ on the left side. Achilles reflexes are 2+ on the right side and 2+ on the left side. Comments: No stocking glove neuropathy. Not ascending.     Psychiatric:         Mood and Affect: Mood normal.         ED Medications  Medications   sodium chloride 0.9 % bolus 1,000 mL (0 mL Intravenous Stopped 11/13/23 1934)       Diagnostic Studies  Results Reviewed       Procedure Component Value Units Date/Time    Folate [[id number]]  (Normal) Collected: 11/13/23 1831    Lab Status: Final result Specimen: Blood from Arm, Left Updated: 11/13/23 1932     Folate 19.3 ng/mL     Vitamin B12 [[id number]]  (Normal) Collected: 11/13/23 1831    Lab Status: Final result Specimen: Blood from Arm, Left Updated: 11/13/23 1932     Vitamin B-12 213 pg/mL     Basic metabolic panel [[id number]] Collected: 11/13/23 1831    Lab Status: Final result Specimen: Blood from Arm, Left Updated: 11/13/23 1906     Sodium 138 mmol/L      Potassium 3.5 mmol/L      Chloride 105 mmol/L      CO2 26 mmol/L      ANION GAP 7 mmol/L      BUN 8 mg/dL      Creatinine 0.82 mg/dL      Glucose 91 mg/dL      Calcium 8.8 mg/dL      eGFR 83 ml/min/1.73sq m     Narrative:      National Kidney Disease Foundation guidelines for Chronic Kidney Disease (CKD):     Stage 1 with normal or high GFR (GFR > 90 mL/min/1.73 square meters)    Stage 2 Mild CKD (GFR = 60-89 mL/min/1.73 square meters)    Stage 3A Moderate CKD (GFR = 45-59 mL/min/1.73 square meters)    Stage 3B Moderate CKD (GFR = 30-44 mL/min/1.73 square meters)    Stage 4 Severe CKD (GFR = 15-29 mL/min/1.73 square meters)    Stage 5 End Stage CKD (GFR <15 mL/min/1.73 square meters)  Note: GFR calculation is accurate only with a steady state creatinine    CK [[id number]]  (Abnormal) Collected: 11/13/23 1831    Lab Status: Final result Specimen: Blood from Arm, Left Updated: 11/13/23 1906     Total  U/L     CBC and differential [[id number]]  (Abnormal) Collected: 11/13/23 1831    Lab Status: Final result Specimen: Blood from Arm, Left Updated: 11/13/23 1844     WBC 4.32 Thousand/uL      RBC 4.05 Million/uL      Hemoglobin 12.0 g/dL      Hematocrit 36.4 %      MCV 90 fL      MCH 29.6 pg      MCHC 33.0 g/dL      RDW 16.6 %      MPV 11.2 fL      Platelets 774 Thousands/uL      nRBC 0 /100 WBCs      Neutrophils Relative 65 %      Immat GRANS % 1 %      Lymphocytes Relative 23 %      Monocytes Relative 10 %      Eosinophils Relative 1 %      Basophils Relative 0 %      Neutrophils Absolute 2.86 Thousands/µL      Immature Grans Absolute 0.02 Thousand/uL      Lymphocytes Absolute 0.98 Thousands/µL      Monocytes Absolute 0.41 Thousand/µL      Eosinophils Absolute 0.04 Thousand/µL      Basophils Absolute 0.01 Thousands/µL                    No orders to display         Procedures  Procedures      ED Course  ED Course as of 11/14/23 0103 Mon Nov 13, 2023 1913 Total CK(!): 193   2041 Patient reevaluated at this time. Patient able to ambulate without difficulty. No signs of hyperreflexia or clonus.   Patient stable for discharge home SBIRT 22yo+      Flowsheet Row Most Recent Value   GLORIA: How many times in the past year have you. .. Used an illegal drug or used a prescription medication for non-medical reasons? Never Filed at: 11/13/2023 1634                  Medical Decision Making  62-year-old female presents emergency department complaining of bilateral leg paresthesias that resolved prior to arrival    DDx: Vitamin B12 deficiency, B9 deficiency, doubt viral illness, GBS, MS    Plan: Basic labs, B9, B12 level, fluids    Folate and B12 within normal limits. Basic metabolic panel within normal limits. CK is elevated at 193 however not elevated enough to be concerned for rhabdomyolysis. CBC is overall unremarkable. Patient able to ambulate in the emergency department no difficulty. Patient resolution of symptoms prior to arrival in emergency department. No signs of hyperreflexia or clonus. No stocking glove or bandlike distribution of neuropathy, paresthesias. No paralysis. Patient stable for discharge home and requested discharge home. Patient instructed to follow-up with PCP. Verbalized understanding and discharged home in stable condition      Amount and/or Complexity of Data Reviewed  Labs: ordered. Decision-making details documented in ED Course. Risk  OTC drugs. Disposition  Final diagnoses:   Weakness     Time reflects when diagnosis was documented in both MDM as applicable and the Disposition within this note       Time User Action Codes Description Comment    11/13/2023  8:42 PM Pravin Hodge Add [R53.1] Weakness           ED Disposition       ED Disposition   Discharge    Condition   Stable    Date/Time   Mon Nov 13, 2023 2041    Comment   Briana Briscoe discharge to home/self care.                    Follow-up Information       Follow up With Specialties Details Why Contact Info Additional Information    Ekaterina Vega MD Internal Medicine   [address] [place] [address] [address]  Emergency Department Emergency Medicine Go to  If symptoms worsen [address] [id number]  [facility] Emergency Department, [address], [place], [place], [facility]            Discharge Medication List as of 11/13/2023  8:44 PM        CONTINUE these medications which have NOT CHANGED    Details   acyclovir (ZOVIRAX) 5 % cream APPLY TO AFFECTED AREA 5 TIMES EVERY DAY, Historical Med      carboxymethylcellulose (REFRESH PLUS) 0.5 % SOLN Apply 1 drop to eye, Historical Med      cycloSPORINE (RESTASIS) 0.05 % ophthalmic emulsion Administer 1 drop to both eyes every 12 (twelve) hours, Starting Fri 11/18/2022, Historical Med      famotidine (PEPCID) 40 MG tablet Take 1 tablet (40 mg total) by mouth 2 (two) times a day, Starting Tue 1/10/2023, Normal      ferrous sulfate 324 (65 Fe) mg Take 1 tablet (324 mg total) by mouth daily before breakfast Do not start before December 26, 2022., Starting Mon 12/26/2022, Normal      hydrochlorothiazide (HYDRODIURIL) 12.5 mg tablet Take 12.5 mg by mouth daily, Starting Wed 2/1/2023, Historical Med      metoprolol succinate (TOPROL-XL) 50 mg 24 hr tablet Take 50 mg by mouth daily, Historical Med      multivitamin (THERAGRAN) TABS Take 1 tablet by mouth every evening, Historical Med      Semaglutide-Weight Management (WEGOVY) 1 MG/0.5ML Inject under the skin, Starting Thu 6/29/2023, Historical Med      sertraline (ZOLOFT) 50 mg tablet daily, Starting Fri 10/29/2021, Historical Med      Tazorac 0.05 % gel APPLY TOPICALLY TO ENTIRE FACE EVERY NIGHT BRAND NECESSARY, Historical Med      tobramycin-dexamethasone (TOBRADEX) ophthalmic suspension Historical Med           No discharge procedures on file. PDMP Review       None             ED Provider  Attending physically available and evaluated Claudette Davis.  I managed the patient along with the ED Attending.     Electronically Signed by           Brittney Worley DO  11/14/23 2306

## 2023-11-13 NOTE — Clinical Note
Ching Medina was seen and treated in our emergency department on 11/13/2023. as tolerated    Diagnosis: weakness    Tish Shepard  may return to work on return date. She may return on this date: 11/15/2023         If you have any questions or concerns, please don't hesitate to call.       Gopal Venegas, DO    ______________________________           _______________          _______________  Hospital Representative                              Date                                Time

## 2023-11-14 NOTE — DISCHARGE INSTRUCTIONS
Ray Salvador was seen and evaluated today in the emergency department over your concern of weakness. The workup that we performed showed no acute lab abnormalities. Please return to the emergency department if you experience weakness, difficulty walking or any other signs and symptoms that may be concerning to you. Please follow-up with your primary care doctor within 1 day. All questions were answered prior to discharge. Thank you for choosing [facility] for your care. F/u with PCP. Tylenol and motrin for covid.

## 2023-11-19 NOTE — ED ATTENDING ATTESTATION
11/13/2023  IRoge MD, saw and evaluated the patient. I have discussed the patient with the resident/non-physician practitioner and agree with the resident's/non-physician practitioner's findings, Plan of Care, and MDM as documented in the resident's/non-physician practitioner's note, except where noted. All available labs and Radiology studies were reviewed. I was present for key portions of any procedure(s) performed by the resident/non-physician practitioner and I was immediately available to provide assistance. At this point I agree with the current assessment done in the Emergency Department. I have conducted an independent evaluation of this patient a history and physical is as follows:    ED Course     53-year-old female with past medical history of factor V Leiden anemia presents to the ED with bilateral leg weakness. Patient currently ill with COVID-19 infection which started proximally 1 week prior to arrival.  Patient was in the shower when she had some bilateral loss of sensation below her knees. Sensation returned back to normal after 10 minutes. Denies any slurred speech or focal weakness. Vitals reviewed. Heart regular rate and rhythm without murmurs. Lungs clear to auscultation bilaterally. Abdomen soft nontender nondistended normal bowel sounds. Extremities no edema. Neuro exam: Cranial nerves gross intact strength out of 5 bilateral normal speech normal gait no limb or truncal ataxia. Normal DTRs bilaterally (patellars bilaterally, ankles bilaterally).   No clonus    Impression: Bilateral lower extremity weakness  Differential diagnosis: Viral syndrome, electrolyte abnormality, doubt stroke doubt ICH doubt Guillain-Barré doubt transverse myelitis    Plan to check screening labs CK IV fluids reassess anticipate discharge with outpatient follow-up          Critical Care Time  Procedures

## 2023-11-30 ENCOUNTER — TELEPHONE (OUTPATIENT)
Dept: HEMATOLOGY ONCOLOGY | Facility: CLINIC | Age: 53
End: 2023-11-30

## 2023-11-30 NOTE — TELEPHONE ENCOUNTER
Appointment Change  Cancel, Reschedule, Change to Virtual      Who are you speaking with? Patient   If it is not the patient, is the caller listed on the communication consent form? N/A   Which provider is the appointment scheduled with? Dr. Bruce Bender   When was the original appointment scheduled? Please list date and time 12/1/23 920   At which location is the appointment scheduled to take place? [facility]   Was the appointment rescheduled? Was the appointment changed from an in person visit to a virtual visit? If so, please list the details of the change. 1/18/24 340   What is the reason for the appointment change? Genetic results not back       Was STAR transport scheduled? N/A   Does STAR transport need to be scheduled for the new visit (if applicable) N/A   Does the patient need an infusion appointment rescheduled? N/A   Does the patient have an upcoming infusion appointment scheduled? If so, when? No   Is the patient undergoing chemotherapy? N/A   For appointments cancelled with less than 24 hours:  Was the no-show policy reviewed?  N/A

## 2023-12-22 ENCOUNTER — TELEPHONE (OUTPATIENT)
Dept: HEMATOLOGY ONCOLOGY | Facility: CLINIC | Age: 53
End: 2023-12-22

## 2023-12-22 NOTE — TELEPHONE ENCOUNTER
Appointment Change  Cancel, Reschedule, Change to Virtual      Who are you speaking with? Patient   If it is not the patient, is the caller listed on the communication consent form? Yes   Which provider is the appointment scheduled with? Dr. Jones   When was the original appointment scheduled?    Please list date and time 1/18/24   At which location is the appointment scheduled to take place? [place]   Was the appointment rescheduled?     Was the appointment changed from an in person visit to a virtual visit?    If so, please list the details of the change. 1/31/24   What is the reason for the appointment change? provider       Was STAR transport scheduled? No   Does STAR transport need to be scheduled for the new visit (if applicable) No   Does the patient need an infusion appointment rescheduled? No   Does the patient have an upcoming infusion appointment scheduled? If so, when? No   Is the patient undergoing chemotherapy? No   For appointments cancelled with less than 24 hours:  Was the no-show policy reviewed? Yes

## 2024-01-31 ENCOUNTER — OFFICE VISIT (OUTPATIENT)
Dept: HEMATOLOGY ONCOLOGY | Facility: CLINIC | Age: 54
End: 2024-01-31
Payer: COMMERCIAL

## 2024-01-31 VITALS
TEMPERATURE: 97.3 F | DIASTOLIC BLOOD PRESSURE: 90 MMHG | BODY MASS INDEX: 32.65 KG/M2 | OXYGEN SATURATION: 99 % | HEART RATE: 100 BPM | HEIGHT: 67 IN | RESPIRATION RATE: 17 BRPM | SYSTOLIC BLOOD PRESSURE: 132 MMHG | WEIGHT: 208 LBS

## 2024-01-31 DIAGNOSIS — D68.51 FACTOR V LEIDEN MUTATION (HCC): Primary | ICD-10-CM

## 2024-01-31 DIAGNOSIS — D50.0 IRON DEFICIENCY ANEMIA DUE TO CHRONIC BLOOD LOSS: ICD-10-CM

## 2024-01-31 PROCEDURE — 99214 OFFICE O/P EST MOD 30 MIN: CPT | Performed by: INTERNAL MEDICINE

## 2024-01-31 RX ORDER — METOPROLOL SUCCINATE 25 MG/1
25 TABLET, EXTENDED RELEASE ORAL DAILY
COMMUNITY
Start: 2024-01-15

## 2024-01-31 NOTE — PROGRESS NOTES
Hematology Outpatient Follow - Up Note  Mariola Briscoe 53 y.o. female MRN: @ Encounter: [id number]        Date:  1/31/2024        Assessment/ Plan:    Atypical hyperplasia of the left breast proven by biopsy no evidence of invasive component by excisional biopsy    There is extensive maternal history for breast cancer in her brother diagnosed with thyroid cancer, no evidence of germline mutation by genetic counselor    We discussed NCCN guideline regarding tamoxifen in a premenopausal female by her versus Evista for total of 5 years    Versus very close observation with MRI/mammogram every 6 to 12 months    The patient decided on very close watchful observation    Iron deficiency anemia secondary to menorrhagia she received Venofer 20 mg IV x 4 doses    Fatigue most likely secondary to Wegovy    Follow-up on as-needed basis        Labs and imaging studies are reviewed by ordering provider once results are available. If there are findings that need immediate attention, you will be contacted when results available.   Discussing results and the implication on your healthcare is best discussed in person at your follow-up visit.       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, iron deficiency anemia secondary to menorrhagia, factor V Leiden mutation, heterozygous     52-year-old premenopausal with menorrhagia seen in the past for iron deficiency anemia she could not tolerate oral iron pills because of nausea, cramps     She had an abnormal screening mammogram which led to stereotactic biopsy of the left breast biopsy showed atypical ductal hyperplasia no evidence of invasive carcinoma in July 18, 2023 after calcification was seen on mammogram or the left breast at 5:00 direction     Status post excisional biopsy on 9/5/2023 confirming atypical ductal hyperplasia with multiple foci of ADH in the background of extensive columnar cell hyperplasia     Extensive family history of cancer with thyroid cancer in  her brother, breast cancer in her maternal aunt, breast cancer in maternal grandmother, breast cancer in maternal cousin  Interval History:    Genetic test came back negative    Previous Treatment:         Test Results:    Imaging: No results found.    Labs:   Lab Results   Component Value Date    WBC 4.32 11/13/2023    HGB 12.0 11/13/2023    HCT 36.4 11/13/2023    MCV 90 11/13/2023     11/13/2023     Lab Results   Component Value Date    K 4.4 01/18/2024     01/18/2024    CO2 23 01/18/2024    BUN 14 01/18/2024    CREATININE 0.88 01/18/2024    GLUF 112 (H) 12/23/2022    CALCIUM 9.3 01/18/2024    CORRECTEDCA 9.3 12/22/2022    AST 13 01/18/2024    ALT 17 01/18/2024    ALKPHOS 52 01/18/2024    EGFR 79 01/18/2024       Lab Results   Component Value Date    IRON 26 (L) 12/22/2022    TIBC 468 (H) 12/22/2022    FERRITIN 5 (L) 12/22/2022       Lab Results   Component Value Date    HQTJZHUA24 213 11/13/2023         ROS: Review of Systems   Constitutional:  Positive for fatigue. Negative for appetite change, chills, diaphoresis and unexpected weight change.   HENT:   Negative for mouth sores, nosebleeds, sore throat, trouble swallowing and voice change.    Eyes:  Negative for eye problems and icterus.   Respiratory:  Negative for chest tightness, cough, hemoptysis and wheezing.    Cardiovascular:  Negative for chest pain, leg swelling and palpitations.   Gastrointestinal:  Negative for abdominal distention, abdominal pain, blood in stool, constipation, diarrhea, nausea and vomiting.   Endocrine: Negative for hot flashes.   Genitourinary:  Negative for bladder incontinence, difficulty urinating, dyspareunia, dysuria and frequency.    Musculoskeletal:  Positive for arthralgias. Negative for back pain, gait problem, neck pain and neck stiffness.   Skin:  Negative for itching and rash.   Neurological:  Negative for dizziness, gait problem, headaches, numbness, seizures and speech difficulty.   Hematological:   Negative for adenopathy. Does not bruise/bleed easily.   Psychiatric/Behavioral:  Negative for decreased concentration, depression, sleep disturbance and suicidal ideas. The patient is nervous/anxious.           Current Medications: Reviewed  Allergies: Reviewed  PMH/FH/SH:  Reviewed      Physical Exam:    Body surface area is 2.06 meters squared.    Wt Readings from Last 3 Encounters:   01/31/24 94.3 kg (208 lb)   09/22/23 95.5 kg (210 lb 8 oz)   09/15/23 97.8 kg (215 lb 9.6 oz)        Temp Readings from Last 3 Encounters:   01/31/24 (!) 97.3 °F (36.3 °C) (Temporal)   11/13/23 98.6 °F (37 °C) (Oral)   10/02/23 98.5 °F (36.9 °C) (Temporal)        BP Readings from Last 3 Encounters:   01/31/24 132/90   11/13/23 (!) 129/102   10/02/23 129/83         Pulse Readings from Last 3 Encounters:   01/31/24 100   11/13/23 94   10/02/23 86        Physical Exam  Vitals reviewed.   Constitutional:       General: She is not in acute distress.     Appearance: She is well-developed. She is not diaphoretic.   HENT:      Head: Normocephalic and atraumatic.   Eyes:      Conjunctiva/sclera: Conjunctivae normal.   Neck:      Trachea: No tracheal deviation.   Cardiovascular:      Rate and Rhythm: Normal rate and regular rhythm.      Heart sounds: No murmur heard.     No friction rub. No gallop.   Pulmonary:      Effort: Pulmonary effort is normal. No respiratory distress.      Breath sounds: Normal breath sounds. No wheezing or rales.   Chest:      Chest wall: No tenderness.   Abdominal:      General: There is no distension.      Palpations: Abdomen is soft.      Tenderness: There is no abdominal tenderness.   Musculoskeletal:      Cervical back: Normal range of motion and neck supple.      Right lower leg: No edema.      Left lower leg: No edema.   Lymphadenopathy:      Cervical: No cervical adenopathy.   Skin:     General: Skin is warm and dry.      Coloration: Skin is not pale.      Findings: No erythema.   Neurological:      Mental  Status: She is alert and oriented to person, place, and time.   Psychiatric:         Behavior: Behavior normal.         Thought Content: Thought content normal.         Judgment: Judgment normal.         ECO    Goals and Barriers:  Current Goal: Minimize effects of disease.   Barriers: None.      Patient's Capacity to Self Care:  Patient is able to self care.    Code Status: [unfilled]

## 2024-02-09 ENCOUNTER — HOSPITAL ENCOUNTER (OUTPATIENT)
Dept: MAMMOGRAPHY | Facility: CLINIC | Age: 54
Discharge: HOME | End: 2024-02-09
Payer: COMMERCIAL

## 2024-02-09 VITALS — HEIGHT: 67 IN | WEIGHT: 208 LBS | BODY MASS INDEX: 32.65 KG/M2

## 2024-02-09 DIAGNOSIS — N60.92 ATYPICAL DUCTAL HYPERPLASIA OF LEFT BREAST: ICD-10-CM

## 2024-02-09 PROCEDURE — 77065 DX MAMMO INCL CAD UNI: CPT

## 2024-02-09 PROCEDURE — G0279 TOMOSYNTHESIS, MAMMO: HCPCS

## 2024-02-09 NOTE — PROGRESS NOTES
Met with patient and    regarding recommendation for;    _____ RIGHT ___X___LEFT      _____Ultrasound guided  ___X___Stereotactic breast biopsy x 2      __X___Verbalized understanding.      Blood thinners:  No: __X___ Yes: ______ What:                 Biopsy teaching sheet given:  Yes: ___X___ No: ________    Pt given contact information and adv to call with any questions/needs    Patient advised to arrive at 1200 for a 1230 appointment

## 2024-02-13 ENCOUNTER — TELEPHONE (OUTPATIENT)
Age: 54
End: 2024-02-13

## 2024-02-13 NOTE — TELEPHONE ENCOUNTER
Pt called regarding mammogram results and inquired whether pt's provider, Dr. Bazzi would recommend a breast MRI. Pt informed clerical team with a preference of primary phone number and to please review the results of the mammogram when available. [phone]    Patient requested a personal call from Dr. Bazzi if possible.

## 2024-03-07 ENCOUNTER — HOSPITAL ENCOUNTER (OUTPATIENT)
Dept: MAMMOGRAPHY | Facility: CLINIC | Age: 54
Discharge: HOME | End: 2024-03-07

## 2024-03-07 RX ORDER — LIDOCAINE HYDROCHLORIDE 10 MG/ML
5 INJECTION, SOLUTION EPIDURAL; INFILTRATION; INTRACAUDAL; PERINEURAL ONCE
Status: DISCONTINUED | OUTPATIENT
Start: 2024-03-07 | End: 2024-03-10 | Stop reason: HOSPADM

## 2024-03-08 ENCOUNTER — TELEPHONE (OUTPATIENT)
Dept: MAMMOGRAPHY | Facility: CLINIC | Age: 54
End: 2024-03-08

## 2024-03-11 ENCOUNTER — TELEPHONE (OUTPATIENT)
Dept: MAMMOGRAPHY | Facility: CLINIC | Age: 54
End: 2024-03-11

## 2024-03-27 NOTE — TELEPHONE ENCOUNTER
Genetics Referral sent via Fax to patient with number given Left another message trying to get pt moved from the waitlist to the schedule.

## 2024-05-03 ENCOUNTER — DOCUMENTATION (OUTPATIENT)
Dept: HEMATOLOGY ONCOLOGY | Facility: CLINIC | Age: 54
End: 2024-05-03

## 2024-05-03 ENCOUNTER — PATIENT OUTREACH (OUTPATIENT)
Dept: HEMATOLOGY ONCOLOGY | Facility: CLINIC | Age: 54
End: 2024-05-03

## 2024-05-03 DIAGNOSIS — C50.912 MALIGNANT NEOPLASM OF LEFT FEMALE BREAST, UNSPECIFIED ESTROGEN RECEPTOR STATUS, UNSPECIFIED SITE OF BREAST (HCC): Primary | ICD-10-CM

## 2024-05-03 NOTE — PROGRESS NOTES
Breast Cancer Nurse Navigation     Chart reviewed for progress of cancer care, appointments verified.     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  Imagin/10/2023 Mammo screening bilateral w 3d & cad  23 Mammo diagnostic left w 3d & cad, US breast left limited (diagnostic)  23 Mammo stereo breast biopsy left, mammo post biopsy left  24 mammo diagnostic left w 3d & cad  24 Mri breast bilateral wwo contrast  24 Mammo 3d katerina diagnostic lt w/cad, US breast axilla right  24 Mammo stere biopsy left complete    Genetics: 23 Ambry negative  Maternal grandmother breast, maternal aunt breast, cousin breast cancer, brother thryoid    Other: History of left breast AHD excisional biopsy 23  Former smoker, Factor V Leiden mutation    Any further needs:   FADI MRI 6 months right breast  Bilateral mammo due May    Nurse Navigator will outreach patient and follow progress.

## 2024-05-03 NOTE — PROGRESS NOTES
Intake received, chart reviewed for need of external records.  Consulting: Dr. Weir   Scheduled on 5-  Dx: Left DCIS    Pathology requested:   From \[facility]\"" via fax [phone], phone [phone]  Accession # [id number]  Slides will be sent directly to [facility] Pathology lab at [address] [place], [place] [address].     Images requested:  From [facility] via fax [phone], phone [phone]    Right US axilla completed on 4-4-2024  Left diagnostic Mammogram completed on 4-4-2024  Bilateral Breast MRI completed on 3-     If not uploaded electronically via [employer], disks will be sent directly to the Radiology Reading Room.     Routed to consulting providers team.

## 2024-05-03 NOTE — PROGRESS NOTES
Breast Oncology Nurse Navigator    Called patient for initial outreach from nurse navigator.  Introduced myself and my role.      Confirmed upcoming appointment with Dr Weir for 05/16/24. When speaking with patient she stated she is unsure if she is going to remain with her breast surgeon at [facility] or go [facility].     She has extensive family history of breast cancer on her maternal side consisting of her aunt, cousin,  and grandmother. She has previously diagnosed with ADH in her left breast and is s/p excisional biopsy from 08/22/23.  She had genetic testing through [email or web address] in the past with negative results..     Referral placed to oncology social worker.    Patient has my contact information and knows she can reach out with questions.    General assessment completed.

## 2024-05-06 ENCOUNTER — PATIENT OUTREACH (OUTPATIENT)
Dept: CASE MANAGEMENT | Facility: HOSPITAL | Age: 54
End: 2024-05-06

## 2024-05-06 NOTE — PROGRESS NOTES
OSW received referral for patient. Patient had scheduled consult with Dr. Weir however cancelled and hasn't rescheduled. Per NN, patient was unsure if she would follow with [facility] or [facility]. OSW will follow to see if she reschedules with [facility].

## 2024-05-08 ENCOUNTER — TELEPHONE (OUTPATIENT)
Age: 54
End: 2024-05-08

## 2024-05-08 ENCOUNTER — LAB REQUISITION (OUTPATIENT)
Dept: LAB | Facility: HOSPITAL | Age: 54
End: 2024-05-08
Payer: COMMERCIAL

## 2024-05-08 DIAGNOSIS — R00.2 PALPITATIONS: Primary | ICD-10-CM

## 2024-05-08 DIAGNOSIS — R22.9 LOCALIZED SWELLING, MASS AND LUMP, UNSPECIFIED: ICD-10-CM

## 2024-05-08 PROCEDURE — 88321 CONSLTJ&REPRT SLD PREP ELSWR: CPT | Performed by: STUDENT IN AN ORGANIZED HEALTH CARE EDUCATION/TRAINING PROGRAM

## 2024-05-08 NOTE — TELEPHONE ENCOUNTER
Gideon Garnett [phone] contacting office requesting the echo order be faxed the the following fax number:    [phone].

## 2024-05-08 NOTE — TELEPHONE ENCOUNTER
Spoke with patient regarding needing an ECHO order entered, patient requires it for cardiac pre op clearance, Dr Hagen was going to enter it previously but I don't see it.    Please inform patient when order placed so she can schedule it. Thank you.    Please advise.

## 2024-05-08 NOTE — TELEPHONE ENCOUNTER
Incoming call from patient regarding requesting a call back from physician. States she has a new breast cancer diagnosis and is requesting to speak with Dr. Bazzi. Aware sending over message for provider. Patient verbalized understanding. No further questions at this time.

## 2024-05-08 NOTE — TELEPHONE ENCOUNTER
Patient requesting cardiac clearance once her ECHO is done and reviewed.    CC sent to you.     Please advise.

## 2024-05-10 DIAGNOSIS — D05.10 DUCTAL CARCINOMA IN SITU (DCIS) OF BREAST, UNSPECIFIED LATERALITY: Primary | ICD-10-CM

## 2024-05-10 NOTE — TELEPHONE ENCOUNTER
Had extensive discussion with patient regarding her upcoming mastectomy    Will put referral in for GYN oncology discussing possible prophylactic bilateral oophorectomy

## 2024-05-13 ENCOUNTER — TELEPHONE (OUTPATIENT)
Dept: HEMATOLOGY ONCOLOGY | Facility: CLINIC | Age: 54
End: 2024-05-13

## 2024-05-13 ENCOUNTER — PATIENT OUTREACH (OUTPATIENT)
Dept: CASE MANAGEMENT | Facility: HOSPITAL | Age: 54
End: 2024-05-13

## 2024-05-13 NOTE — TELEPHONE ENCOUNTER
Spoke in detail with patient last Friday regarding planned mastectomy in the near future.    Patient reviewed all clinical findings with me during our discussion    Will stay in touch as therapy progresses    Patient told to feel free to call for any problems, questions, issues or concerns which may arise for her

## 2024-05-13 NOTE — PROGRESS NOTES
[facility] completed chart review. [facility] will close at this time however [facility] please re-refer.

## 2024-05-13 NOTE — TELEPHONE ENCOUNTER
I called Mariola in response to a referral that was received for patient to establish care with Gynecologic Oncology.     Outreach was made to complete patient's intake questionnaire .    I left a voicemail explaining the reason for my call and advised patient to call \[facility]\"" at [phone].  Another attempt will be made to contact patient.

## 2024-05-15 ENCOUNTER — TELEPHONE (OUTPATIENT)
Dept: HEMATOLOGY ONCOLOGY | Facility: CLINIC | Age: 54
End: 2024-05-15

## 2024-05-15 NOTE — TELEPHONE ENCOUNTER
Allyson called in response to a referral that was received for patient to establish care with Gynecologic Oncology.     Outreach was made to complete patient's intake questionnaire .    Patient's intake questionnaire was reviewed and complete. Patient's intake has been sent to the team for clinical review.

## 2024-05-16 ENCOUNTER — PATIENT OUTREACH (OUTPATIENT)
Dept: HEMATOLOGY ONCOLOGY | Facility: CLINIC | Age: 54
End: 2024-05-16

## 2024-05-16 NOTE — PROGRESS NOTES
Breast Oncology Nurse Navigator    Received a referral for outreach. Previously outreached patient on 05/03. During the discussion she was unsure if she would continue care with at [facility] or go to [facility]. [facility] Spoke with patient, she said she is staying with [facility].   I let patient know should anything change to please reach out. Patient agreed and has my contact information.   NN removed from care team

## 2024-05-21 ENCOUNTER — TELEPHONE (OUTPATIENT)
Dept: HEMATOLOGY ONCOLOGY | Facility: CLINIC | Age: 54
End: 2024-05-21

## 2024-05-21 NOTE — TELEPHONE ENCOUNTER
I called Mariola in response to a referral that was received for patient to establish care with Gynecologic Oncology.     Outreach was made to schedule a consultation.    Patient declined scheduling. The referral has been closed. Patient advise she was advised from her oncologist at [facility] to wait on scheduling until after she get her final path back which they have scheduled on 6/6. Patient advise after she has been seen py her doctor she may call back to schedule an appointment based off of what the doctor recommend.

## 2024-06-20 ENCOUNTER — DOCUMENTATION (OUTPATIENT)
Dept: HEMATOLOGY ONCOLOGY | Facility: CLINIC | Age: 54
End: 2024-06-20

## 2024-08-07 ENCOUNTER — TELEPHONE (OUTPATIENT)
Dept: OBGYN CLINIC | Facility: CLINIC | Age: 54
End: 2024-08-07

## 2024-08-07 NOTE — TELEPHONE ENCOUNTER
Lvm for patient to call office to schedule annual exam sometime in the end of October as advised by Dr. Bazzi.

## 2024-08-07 NOTE — TELEPHONE ENCOUNTER
Personally spoke with patient reviewing recent medical and surgical history.    Patient did undergo definitive surgery with mastectomy in early June.  Results showed clean margins and all lymph node sampling was negative.    With reconstruction patient has experienced an infection and healing is taking a longer time to be achieved.    CT imaging done recently showed uterine fibroids which had been visualized a little over a year ago as well on ultrasound.    I suggested that patient have her yearly exam which is due in September sometime the end of October given what has been going on with healing after the mastectomy.    My staff will call her to arrange her annual gynecologic and medical examination for the end of October.    All questions were answered for her during today's phone call    Patient knows to call for any problems, questions, issues or concerns which may arise for her.

## 2024-10-10 ENCOUNTER — OFFICE VISIT (OUTPATIENT)
Dept: CARDIOLOGY CLINIC | Facility: CLINIC | Age: 54
End: 2024-10-10
Payer: COMMERCIAL

## 2024-10-10 VITALS
WEIGHT: 203.2 LBS | DIASTOLIC BLOOD PRESSURE: 92 MMHG | HEIGHT: 67 IN | SYSTOLIC BLOOD PRESSURE: 162 MMHG | HEART RATE: 72 BPM | OXYGEN SATURATION: 99 % | BODY MASS INDEX: 31.89 KG/M2

## 2024-10-10 DIAGNOSIS — R00.2 PALPITATIONS: ICD-10-CM

## 2024-10-10 DIAGNOSIS — E78.5 DYSLIPIDEMIA: Primary | ICD-10-CM

## 2024-10-10 DIAGNOSIS — I10 PRIMARY HYPERTENSION: ICD-10-CM

## 2024-10-10 PROCEDURE — 99214 OFFICE O/P EST MOD 30 MIN: CPT | Performed by: INTERNAL MEDICINE

## 2024-10-10 RX ORDER — AMLODIPINE BESYLATE 5 MG/1
5 TABLET ORAL DAILY
Qty: 90 TABLET | Refills: 3 | Status: SHIPPED | OUTPATIENT
Start: 2024-10-10

## 2024-10-10 RX ORDER — METOPROLOL SUCCINATE 50 MG/1
50 TABLET, EXTENDED RELEASE ORAL 2 TIMES DAILY
Qty: 180 TABLET | Refills: 3 | Status: SHIPPED | OUTPATIENT
Start: 2024-10-10

## 2024-10-10 RX ORDER — TRETINOIN 0.5 MG/G
CREAM TOPICAL
COMMUNITY
Start: 2024-08-26

## 2024-10-10 NOTE — PATIENT INSTRUCTIONS
Recommendations:  1. Start amlodipine 5mg daily.  2. Continue remainder of medications.  3. Increase fluid intake.  4. Follow up in 3 months.

## 2024-10-10 NOTE — PROGRESS NOTES
Cardiology   Mariola Briscoe 53 y.o. female MRN: [id number]        Impression:  1. Hypertension - not adequate control.  Will continue metoprolol and start amlodipine 5mg daily.   2. Dyslipidemia - .  HDL 31  3. Palpitations - resolved.     Recommendations:  1. Start amlodipine 5mg daily.  2. Continue remainder of medications.  3. Increase fluid intake.  4. Follow up in 3 months.         HPI: Mariola Briscoe is a 53 y.o. year old female with hypertension and dyslipidemia who presents for follow up. No chest pain or shortness of breath. Echo demonstrated normal LV systolic function, mild LVH. Does have headache.  Is not drinking enough water.         Review of Systems   Constitutional: Negative.    HENT: Negative.     Eyes: Negative.    Respiratory:  Negative for chest tightness and shortness of breath.    Cardiovascular:  Negative for chest pain, palpitations and leg swelling.   Gastrointestinal: Negative.    Endocrine: Negative.    Genitourinary: Negative.    Musculoskeletal: Negative.    Skin: Negative.    Allergic/Immunologic: Negative.    Neurological:  Positive for headaches.   Hematological: Negative.    Psychiatric/Behavioral: Negative.     All other systems reviewed and are negative.        Past Medical History:   Diagnosis Date    BRCA1 negative     BRCA2 negative     Bulging lumbar disc     L1-L2    Endometrial hyperplasia     endometrial polyp    Factor V deficiency ([facility]) 2005    Factor V Leiden ([facility])     GERD (gastroesophageal reflux disease)     Hydrops     eye    RSD lower limb      Past Surgical History:   Procedure Laterality Date    BREAST BIOPSY Left 2023    stereo- adh    BREAST LUMPECTOMY Left 2023    done at [facility]- non invasive     SECTION      EGD  2020    Dr. Calderon.  Symptoms of reflux after antibiotics.  Biopsies negative for H. pylori, negative eosinophilic esophagitis, positive for reflux esophagitis.    FOOT SURGERY      MAMMO NEEDLE LOCALIZATION  LEFT (ALL INC) Left 2023    MAMMO STEREOTACTIC BREAST BIOPSY LEFT (ALL INC) Left 2023    MOUTH SURGERY      TN EXC TUMOR SOFT TISS FACE&SCALP SUBFASCIAL <2CM N/A 2017    Procedure: EXCISION FOREHEAD MASS;  Surgeon: Ben Guerra MD;  Location: [facility];  Service: Plastics    TUBAL LIGATION       Social History     Substance and Sexual Activity   Alcohol Use Not Currently    Comment: rarely     Social History     Substance and Sexual Activity   Drug Use No     Social History     Tobacco Use   Smoking Status Former    Current packs/day: 0.00    Types: Cigarettes    Start date:     Quit date:     Years since quittin.7   Smokeless Tobacco Never     Family History   Problem Relation Age of Onset    Colon polyps Mother     Aneurysm Father     Hypertension Father     No Known Problems Daughter     Breast cancer Maternal Grandmother         unsure what age    No Known Problems Maternal Grandfather     No Known Problems Paternal Grandmother     No Known Problems Paternal Grandfather     Thyroid cancer Brother     Breast cancer Maternal Aunt 75        mothers twin sister    No Known Problems Maternal Aunt     No Known Problems Maternal Aunt     No Known Problems Maternal Aunt     Breast cancer Other 36        maternal    Colon cancer Neg Hx     Endometrial cancer Neg Hx     Ovarian cancer Neg Hx        Allergies:  Allergies   Allergen Reactions    Pantoprazole Hives and Anaphylaxis    Epinephrine Other (See Comments)     Numbness feeling throughout body    Diflucan [Fluconazole]     Erythromycin GI Intolerance    Nickel Other (See Comments)     infection    Tetracycline Vomiting    Cefpodoxime Rash    Iodinated Contrast Media Rash     Patient stated rash on her  arm where they injected the  dye       Medications:     Current Outpatient Medications:     acyclovir (ZOVIRAX) 5 % cream, APPLY TO AFFECTED AREA 5 TIMES EVERY DAY, Disp: , Rfl:     carboxymethylcellulose (REFRESH PLUS) 0.5 % SOLN,  Apply 1 drop to eye, Disp: , Rfl:     cycloSPORINE (RESTASIS) 0.05 % ophthalmic emulsion, Administer 1 drop to both eyes every 12 (twelve) hours, Disp: , Rfl:     famotidine (PEPCID) 40 MG tablet, Take 1 tablet (40 mg total) by mouth 2 (two) times a day, Disp: 60 tablet, Rfl: 3    metoprolol succinate (TOPROL-XL) 25 mg 24 hr tablet, Take 50 mg by mouth 2 (two) times a day, Disp: , Rfl:     Semaglutide-Weight Management (WEGOVY) 1 MG/0.5ML, Inject under the skin, Disp: , Rfl:     sertraline (ZOLOFT) 50 mg tablet, daily, Disp: , Rfl:     Tazorac 0.05 % gel, APPLY TOPICALLY TO ENTIRE FACE EVERY NIGHT BRAND NECESSARY, Disp: , Rfl:     tretinoin (RETIN-A) 0.05 % cream, APPLY THIN LAYER TOPICALLY TO CLEAN, DRY FACE EVERY NIGHT AT BEDTIME, Disp: , Rfl:     ferrous sulfate 324 (65 Fe) mg, Take 1 tablet (324 mg total) by mouth daily before breakfast Do not start before December 26, 2022. (Patient not taking: Reported on 10/2/2023), Disp: 30 tablet, Rfl: 0    hydrochlorothiazide (HYDRODIURIL) 12.5 mg tablet, Take 12.5 mg by mouth daily (Patient not taking: Reported on 10/2/2023), Disp: , Rfl:     multivitamin (THERAGRAN) TABS, Take 1 tablet by mouth every evening (Patient not taking: Reported on 10/2/2023), Disp: , Rfl:     tobramycin-dexamethasone (TOBRADEX) ophthalmic suspension, , Disp: , Rfl:       Wt Readings from Last 3 Encounters:   10/10/24 92.2 kg (203 lb 3.2 oz)   02/09/24 94.3 kg (208 lb)   01/31/24 94.3 kg (208 lb)     Temp Readings from Last 3 Encounters:   01/31/24 (!) 97.3 °F (36.3 °C) (Temporal)   11/13/23 98.6 °F (37 °C) (Oral)   10/02/23 98.5 °F (36.9 °C) (Temporal)     BP Readings from Last 3 Encounters:   10/10/24 162/92   01/31/24 132/90   11/13/23 (!) 129/102     Pulse Readings from Last 3 Encounters:   10/10/24 72   01/31/24 100   11/13/23 94         Physical Exam  HENT:      Head: Atraumatic.      Mouth/Throat:      Mouth: Mucous membranes are moist.   Eyes:      Extraocular Movements: Extraocular  "movements intact.   Cardiovascular:      Rate and Rhythm: Normal rate and regular rhythm.      Heart sounds: Normal heart sounds.   Pulmonary:      Effort: Pulmonary effort is normal.      Breath sounds: Normal breath sounds.   Abdominal:      General: Abdomen is flat.   Musculoskeletal:         General: Normal range of motion.      Cervical back: Normal range of motion.   Skin:     General: Skin is warm.   Neurological:      General: No focal deficit present.      Mental Status: She is alert and oriented to person, place, and time.           Laboratory Studies:  CMP:  Lab Results   Component Value Date    K 4.1 10/03/2024     10/03/2024    CO2 27 10/03/2024    BUN 11 10/03/2024    CREATININE 0.81 10/03/2024    AST 14 10/03/2024    ALT 18 10/03/2024    EGFR 87 10/03/2024       Lipid Profile:   No results found for: \"CHOL\"  Lab Results   Component Value Date    HDL 44 07/16/2020     Lab Results   Component Value Date    LDLCALC 187 (H) 07/16/2020     Lab Results   Component Value Date    TRIG 184 (H) 07/16/2020               "

## 2024-11-08 ENCOUNTER — OFFICE VISIT (OUTPATIENT)
Dept: OBGYN CLINIC | Facility: CLINIC | Age: 54
End: 2024-11-08
Payer: COMMERCIAL

## 2024-11-08 VITALS — DIASTOLIC BLOOD PRESSURE: 90 MMHG | SYSTOLIC BLOOD PRESSURE: 130 MMHG | WEIGHT: 200.8 LBS | BODY MASS INDEX: 31.45 KG/M2

## 2024-11-08 DIAGNOSIS — N95.2 ATROPHIC VAGINITIS: ICD-10-CM

## 2024-11-08 DIAGNOSIS — Z01.419 ENCOUNTER FOR GYNECOLOGICAL EXAMINATION WITHOUT ABNORMAL FINDING: Primary | ICD-10-CM

## 2024-11-08 DIAGNOSIS — Z12.31 ENCOUNTER FOR SCREENING MAMMOGRAM FOR MALIGNANT NEOPLASM OF BREAST: ICD-10-CM

## 2024-11-08 DIAGNOSIS — Z01.419 PAP SMEAR, AS PART OF ROUTINE GYNECOLOGICAL EXAMINATION: ICD-10-CM

## 2024-11-08 PROCEDURE — 99396 PREV VISIT EST AGE 40-64: CPT | Performed by: OBSTETRICS & GYNECOLOGY

## 2024-11-08 NOTE — PROGRESS NOTES
Assessment/Plan:    No problem-specific Assessment & Plan notes found for this encounter.       Diagnoses and all orders for this visit:    Encounter for gynecological examination without abnormal finding  -     Thinprep Tis and HPV mRNA E6/E7    Pap smear, as part of routine gynecological examination    Encounter for screening mammogram for malignant neoplasm of breast  -     Mammo screening bilateral w 3d and cad; Future    Atrophic vaginitis          Normal gynecological physical examination.  Self-breast examination stressed.  Mammogram ordered.  Discussed regular exercise, healthy diet, importance of vitamin D and calcium supplements.  Discussed importance of sun block use during periods of prolonged sun exposure.  Patient will be seen in 1 year for routine gynecologic and medical examination.  Patient will call office for any problems, concerns, or issues which may arise during the interim.     Subjective:          HPI    Patient ID: Mariola Briscoe is a 54 y.o. female who presents today for her annual gynecologic and medical examination    Menstrual status: Patient reports that her menstrual cycles are still every 23 to 26 days.  They are somewhat heavier at this time.  Denies any significant pain or cramping however.  Denies bleeding between her cycles as well.  Will obtain baseline pelvic ultrasound in January in light of the perimenopausal bleeding pattern.    Vasomotor symptoms: Complains of occasional hot flashes at this time    Patient reports normal appetite    Patient reports normal bowel and bladder habits    Patient denies any significant pelvic or abdominal pain    Patient denies any headaches, chest pain, shortness of breath fever shakes or chills    Patient denies any COVID 19 symptoms including cough or loss of taste or smell    COVID vaccine status: Aware COVID vaccination protocols    Medical problems: Followed medically for blood pressure    Colonoscopy status: Aware of colonoscopy  protocols    Mammogram status: Importance of self breast examination stressed to the patient and she is up-to-date with screening mammography.  Will see patient in 6 months for breast exam in light of her breast history.    The following portions of the patient's history were reviewed and updated as appropriate: allergies, current medications, past family history, past medical history, past social history, past surgical history and problem list.    Review of Systems   Constitutional: Negative.  Negative for appetite change, diaphoresis, fatigue, fever and unexpected weight change.   HENT: Negative.     Eyes: Negative.    Respiratory: Negative.     Cardiovascular: Negative.         Patient followed for blood pressure   Gastrointestinal: Negative.  Negative for abdominal pain, blood in stool, constipation, diarrhea, nausea and vomiting.   Endocrine: Negative.  Negative for cold intolerance and heat intolerance.   Genitourinary: Negative.  Negative for dysuria, frequency, hematuria, urgency, vaginal bleeding, vaginal discharge and vaginal pain.   Musculoskeletal: Negative.    Skin: Negative.    Allergic/Immunologic: Negative.    Neurological: Negative.    Hematological: Negative.  Negative for adenopathy.   Psychiatric/Behavioral: Negative.           Objective:      /90   Wt 91.1 kg (200 lb 12.8 oz)   BMI 31.45 kg/m²          Physical Exam  Constitutional:       General: She is not in acute distress.     Appearance: Normal appearance. She is well-developed. She is not diaphoretic.   HENT:      Head: Normocephalic and atraumatic.   Eyes:      Pupils: Pupils are equal, round, and reactive to light.   Cardiovascular:      Rate and Rhythm: Normal rate and regular rhythm.      Heart sounds: Normal heart sounds. No murmur heard.     No friction rub. No gallop.   Pulmonary:      Effort: Pulmonary effort is normal.      Breath sounds: Normal breath sounds.   Chest:   Breasts:     Breasts are symmetrical.      Right: No  inverted nipple, mass, nipple discharge, skin change or tenderness.      Left: No inverted nipple, mass, nipple discharge, skin change or tenderness.   Abdominal:      General: Bowel sounds are normal.      Palpations: Abdomen is soft.   Genitourinary:     General: Normal vulva.      Exam position: Supine.      Labia:         Right: No rash or lesion.         Left: No rash or lesion.       Urethra: No urethral swelling or urethral lesion.      Vagina: Normal. No vaginal discharge, erythema, tenderness or bleeding.      Cervix: No discharge or friability.      Uterus: Not enlarged and not tender.       Adnexa:         Right: No mass, tenderness or fullness.          Left: No mass, tenderness or fullness.        Rectum: Normal. Guaiac result negative.      Comments: Pelvic exam revealed mild atrophic vaginitis  Good pelvic support confirmed  Musculoskeletal:         General: Normal range of motion.      Cervical back: Normal range of motion and neck supple.   Lymphadenopathy:      Cervical: No cervical adenopathy.      Upper Body:      Right upper body: No supraclavicular adenopathy.      Left upper body: No supraclavicular adenopathy.   Skin:     General: Skin is warm and dry.      Findings: No rash.   Neurological:      General: No focal deficit present.      Mental Status: She is alert and oriented to person, place, and time.   Psychiatric:         Mood and Affect: Mood normal.         Speech: Speech normal.         Behavior: Behavior normal.         Thought Content: Thought content normal.         Judgment: Judgment normal.

## 2024-11-08 NOTE — PATIENT INSTRUCTIONS
Normal gynecological physical examination.  Self-breast examination stressed.  Mammogram ordered.  Discussed regular exercise, healthy diet, importance of vitamin D and calcium supplements.  Discussed importance of sun block use during periods of prolonged sun exposure.  Patient will be seen in 1 year for routine gynecologic and medical examination.  Patient will call office for any problems, concerns, or issues which may arise during the interim.  Will see in 6 months for interval breast examination

## 2024-11-13 LAB
CLINICAL INFO: NORMAL
DATE PREVIOUS BX: NORMAL
GEN CATEG CVX/VAG CYTO-IMP: NORMAL
HPV E6+E7 MRNA CVX QL NAA+PROBE: NOT DETECTED
LMP START DATE: NORMAL
SL AMB PREV. PAP:: NORMAL
SPECIMEN SOURCE CVX/VAG CYTO: NORMAL

## 2024-12-28 ENCOUNTER — TELEMEDICINE (OUTPATIENT)
Dept: OTHER | Facility: HOSPITAL | Age: 54
End: 2024-12-28
Payer: COMMERCIAL

## 2024-12-28 DIAGNOSIS — J01.00 ACUTE MAXILLARY SINUSITIS, RECURRENCE NOT SPECIFIED: Primary | ICD-10-CM

## 2024-12-28 PROCEDURE — 99213 OFFICE O/P EST LOW 20 MIN: CPT | Performed by: PHYSICIAN ASSISTANT

## 2024-12-28 RX ORDER — FLUTICASONE PROPIONATE 50 MCG
2 SPRAY, SUSPENSION (ML) NASAL DAILY
Qty: 16 G | Refills: 0 | Status: SHIPPED | OUTPATIENT
Start: 2024-12-28

## 2024-12-28 NOTE — PROGRESS NOTES
Virtual Regular Visit  Name: Mariola Briscoe      : 1970      MRN: [id number]  Encounter Provider: Danielle Lee Seiple, PA-C  Encounter Date: 2024   Encounter department: VIRTUAL CARE       Verification of patient location:  Patient is located at Home in the following state in which I hold an active license [place] :  Assessment & Plan  Acute maxillary sinusitis, recurrence not specified    Orders:    amoxicillin-clavulanate (AUGMENTIN) 875-125 mg per tablet; Take 1 tablet by mouth every 12 (twelve) hours for 10 days    fluticasone (FLONASE) 50 mcg/act nasal spray; 2 sprays into each nostril daily    Discontinue afrin nasal spray.     Encounter provider Danielle Lee Seiple, PA-C    The patient was identified by name and date of birth. Mariola Briscoe was informed that this is a telemedicine visit and that the visit is being conducted through the Epic Embedded platform. She agrees to proceed..  My office door was closed. No one else was in the room.  She acknowledged consent and understanding of privacy and security of the video platform. The patient has agreed to participate and understands they can discontinue the visit at any time.    Patient is aware this is a billable service.     History was obtained from: History obtained from: patient  History of Present Illness     Mariola presents via virtual visit for evaluation of sore throat, nasal congestion, sinus pressure and decreased smell that started ~1 week ago. Patient also reports that the congestion is going into her chest. With constant post nasal drip.   Sleeping ok at night. Cough just started this morning. Also with sinus headache.   Normal appetite and drinking. Normal urine output and bowel movements.   Denies fever. Patient took 3 covid tests and they were all negative.       Review of Systems   Constitutional:  Negative for activity change, appetite change, fatigue and fever.   HENT:  Positive for congestion and sinus pressure.  Negative for ear pain, rhinorrhea, sinus pain, sneezing, sore throat and trouble swallowing.    Eyes:  Negative for discharge and redness.   Respiratory:  Positive for cough. Negative for shortness of breath and wheezing.    Gastrointestinal:  Negative for abdominal pain, constipation, diarrhea, nausea and vomiting.   Skin:  Negative for rash.       Objective   There were no vitals taken for this visit.    Physical Exam  Constitutional:       General: She is awake.      Appearance: Normal appearance. She is well-developed, well-groomed and normal weight. She is not ill-appearing.   HENT:      Head: Normocephalic.      Right Ear: External ear normal.      Left Ear: External ear normal.      Nose: Congestion and rhinorrhea present.      Right Sinus: Maxillary sinus tenderness present.      Left Sinus: Maxillary sinus tenderness present.      Mouth/Throat:      Lips: Pink. No lesions.      Mouth: Mucous membranes are moist.   Eyes:      General: Lids are normal.   Pulmonary:      Effort: Pulmonary effort is normal. No respiratory distress.      Comments: Frequent cough  Skin:     Coloration: Skin is not pale.      Findings: No rash.   Neurological:      Mental Status: She is alert.   Psychiatric:         Attention and Perception: Attention normal.         Speech: Speech normal.         Behavior: Behavior is cooperative.         Visit Time  Total Visit Duration: 10 minutes not including the time spent for establishing the audio/video connection.

## 2025-01-01 ENCOUNTER — TELEMEDICINE (OUTPATIENT)
Dept: OTHER | Facility: HOSPITAL | Age: 55
End: 2025-01-01
Payer: COMMERCIAL

## 2025-01-01 DIAGNOSIS — J06.9 UPPER RESPIRATORY TRACT INFECTION, UNSPECIFIED TYPE: ICD-10-CM

## 2025-01-01 DIAGNOSIS — H60.502 ACUTE OTITIS EXTERNA OF LEFT EAR, UNSPECIFIED TYPE: Primary | ICD-10-CM

## 2025-01-01 PROCEDURE — 99213 OFFICE O/P EST LOW 20 MIN: CPT | Performed by: PHYSICIAN ASSISTANT

## 2025-01-01 RX ORDER — OFLOXACIN 3 MG/ML
10 SOLUTION AURICULAR (OTIC) DAILY
Qty: 5 ML | Refills: 0 | Status: SHIPPED | OUTPATIENT
Start: 2025-01-01 | End: 2025-01-08

## 2025-01-01 RX ORDER — BENZONATATE 100 MG/1
100 CAPSULE ORAL 3 TIMES DAILY PRN
Qty: 20 CAPSULE | Refills: 0 | Status: SHIPPED | OUTPATIENT
Start: 2025-01-01

## 2025-01-01 NOTE — PATIENT INSTRUCTIONS
Continue augmentin   Start ear drops as directed  Tessalon for cough  Follow up with PCP  ER if worsen

## 2025-01-01 NOTE — PROGRESS NOTES
Virtual Regular Visit  Name: Mariola Briscoe      : 1970      MRN: [id number]  Encounter Provider: Radha Arteaga PA-C  Encounter Date: 2025   Encounter department: VIRTUAL CARE       Verification of patient location:  Patient is located at Home in the following state in which I hold an active license [place] :  Assessment & Plan  Acute otitis externa of left ear, unspecified type    Orders:    ofloxacin (FLOXIN) 0.3 % otic solution; Administer 10 drops into the left ear daily for 7 days    Upper respiratory tract infection, unspecified type    Orders:    benzonatate (TESSALON PERLES) 100 mg capsule; Take 1 capsule (100 mg total) by mouth 3 (three) times a day as needed for cough        Encounter provider Radha Arteaga PA-C    The patient was identified by name and date of birth. Mariola Briscoe was informed that this is a telemedicine visit and that the visit is being conducted through the Epic Embedded platform. She agrees to proceed..  My office door was closed. No one else was in the room.  She acknowledged consent and understanding of privacy and security of the video platform. The patient has agreed to participate and understands they can discontinue the visit at any time.    Patient is aware this is a billable service.     History of Present Illness     Patient states that she had a video visit on Friday and was given augmentin. She is feeling better.  She still has a earache and feels it is all filled with fluid. Her ear was itchy for a few days. The last few days it has been painful and feels there is fluid.  She still has a cough. Her symptoms started on dorcas geraldine. She is taking flonase and mucinex.  The ear pain hurts.  She did use a qtip. She denies any drainage. It feels like she has water in her ear canal.  Her gland feels swollen.  She states her ear canal feels like she just got out of a pool        Review of Systems   Constitutional: Negative.    HENT:  Positive for congestion  and ear pain. Negative for ear discharge.    Respiratory:  Positive for cough. Negative for shortness of breath.    Cardiovascular: Negative.    Gastrointestinal: Negative.    Musculoskeletal: Negative.    Neurological: Negative.    Psychiatric/Behavioral: Negative.         Objective   There were no vitals taken for this visit.    Physical Exam  Constitutional:       General: She is not in acute distress.     Appearance: Normal appearance. She is not ill-appearing, toxic-appearing or diaphoretic.   HENT:      Head: Normocephalic and atraumatic.      Left Ear: Ear canal and external ear normal.      Nose: Congestion present.   Pulmonary:      Effort: Pulmonary effort is normal. No respiratory distress.   Skin:     General: Skin is dry.   Neurological:      General: No focal deficit present.      Mental Status: She is alert and oriented to person, place, and time.   Psychiatric:         Mood and Affect: Mood normal.         Behavior: Behavior normal.         Visit Time  Total Visit Duration: 5

## 2025-01-03 NOTE — PROGRESS NOTES
AMB US Pelvic Non OB    Date/Time: 2025 9:00 AM    Performed by: Rosa Quintero  Authorized by: Tiago Bazzi MD  Universal Protocol:  Consent: Verbal consent obtained.  Consent given by: patient  Timeout called at: 2025 9:05 AM.  Patient understanding: patient states understanding of the procedure being performed  Patient identity confirmed: verbally with patient    Procedure details:     SIS Procedure: No    Technique:  Transvaginal US, Non-OB    Position: lithotomy exam    Uterine findings:     Length (cm): 11.98    Height (cm):  6    Width (cm):  7.57    Endometrial stripe: identified      Endometrium thickness (mm):  16.12  Left ovary findings:     Left ovary:  Visualized    Length (cm): 3.88    Height (cm): 2.29    Width (cm): 2.28  Right ovary findings:     Right ovary:  Visualized    Length (cm): 3.08    Height (cm): 1.67    Width (cm): 2.1  Other findings:     Free pelvic fluid: not identified      Free peritoneal fluid: not identified    Post-Procedure Details:     Impression:  Anteverted uterus demonstrates a right subserosal fibroid at the lower uterine segment, 5.8cm and a posterior left intramural lower uterine segment fibroid, 1.6cm. The endometrium is thickened and echogenic. The  scar appears 6.8mm from the cervical canal and just inferior to the largest fibroid. The bilateral ovaries appear within normal limits. No free fluid.     Tolerance:  Tolerated well, no immediate complications    Complications: no complications    Additional Procedure Comments:      GE VolusonP8 RIC5-9A-[id number] transvaginal transducer Serial #[id number] was used to perform the examination today and subsequently followed with high level disinfection utilizing Trophon EPR procedure.     Ultrasound performed at:     [facility]  [address] [place] [address]  [address]  [place] [address]  Phone  [phone]  Fax  [phone]

## 2025-01-07 ENCOUNTER — OFFICE VISIT (OUTPATIENT)
Dept: OBGYN CLINIC | Facility: CLINIC | Age: 55
End: 2025-01-07
Payer: COMMERCIAL

## 2025-01-07 ENCOUNTER — ULTRASOUND (OUTPATIENT)
Dept: OBGYN CLINIC | Facility: CLINIC | Age: 55
End: 2025-01-07
Payer: COMMERCIAL

## 2025-01-07 VITALS — BODY MASS INDEX: 31.64 KG/M2 | SYSTOLIC BLOOD PRESSURE: 110 MMHG | DIASTOLIC BLOOD PRESSURE: 80 MMHG | WEIGHT: 202 LBS

## 2025-01-07 DIAGNOSIS — Z71.2 ENCOUNTER TO DISCUSS TEST RESULTS: Primary | ICD-10-CM

## 2025-01-07 DIAGNOSIS — N95.1 PERIMENOPAUSE: ICD-10-CM

## 2025-01-07 DIAGNOSIS — D21.9 FIBROID: ICD-10-CM

## 2025-01-07 DIAGNOSIS — D21.9 FIBROID: Primary | ICD-10-CM

## 2025-01-07 PROCEDURE — 76830 TRANSVAGINAL US NON-OB: CPT | Performed by: OBSTETRICS & GYNECOLOGY

## 2025-01-07 PROCEDURE — 99213 OFFICE O/P EST LOW 20 MIN: CPT | Performed by: OBSTETRICS & GYNECOLOGY

## 2025-01-07 NOTE — PATIENT INSTRUCTIONS
Please follow-up at your next visit in May.  If you have changes in breasts, changes in vaginal bleeding, or other concerns please contact the office.

## 2025-01-07 NOTE — PROGRESS NOTES
Assessment/Plan:      Diagnoses and all orders for this visit:    Encounter to discuss test results    Perimenopause    Fibroid        We discussed the patient's ultrasound results.  We talked about indications for intervention for fibroids, but that the plan would be to continue to monitor patient's symptoms and bleeding.  At this time patient is not debilitated by her bleeding symptoms.  Patient has follow-up at May 12 for her next scheduled visit.  All questions were answered.    Subjective:     Patient ID: Mariola Briscoe is a 54 y.o. female with a past medical history of hypertension and factor V Leiden who presents for a follow-up on her ultrasound results.  The ultrasound was done for a baseline due to perimenopausal bleeding pattern reported at her last visit.  She notes some urinary urgency.      Total time of today's visit was 20 minutes of which greater than 50% was spent face-to-face counseling the patient as well as coordination of care, review of chart and lab values, physical examination as well as computer entry into the [facility] medical record system.      HPI    Review of Systems   Constitutional: Negative.  Negative for appetite change, diaphoresis, fatigue, fever and unexpected weight change.   HENT: Negative.     Eyes: Negative.    Respiratory: Negative.     Cardiovascular: Negative.         Followed for blood pressure   Gastrointestinal: Negative.  Negative for abdominal pain, blood in stool, constipation, diarrhea, nausea and vomiting.   Endocrine: Negative.  Negative for cold intolerance and heat intolerance.   Genitourinary:  Positive for menstrual problem and urgency. Negative for dysuria, frequency, hematuria, vaginal bleeding, vaginal discharge and vaginal pain.   Musculoskeletal: Negative.    Skin: Negative.    Allergic/Immunologic: Negative.    Neurological: Negative.    Hematological: Negative.  Negative for adenopathy.        Followed for factor V Leiden   Psychiatric/Behavioral:  Negative.           Objective:     Physical Exam  Constitutional:       Appearance: She is well-developed.   HENT:      Head: Normocephalic and atraumatic.   Eyes:      Pupils: Pupils are equal, round, and reactive to light.   Pulmonary:      Effort: Pulmonary effort is normal.   Musculoskeletal:         General: Normal range of motion.      Cervical back: Normal range of motion and neck supple.   Skin:     General: Skin is warm and dry.   Neurological:      General: No focal deficit present.      Mental Status: She is alert and oriented to person, place, and time.   Psychiatric:         Mood and Affect: Mood normal.         Behavior: Behavior normal.         Thought Content: Thought content normal.         Judgment: Judgment normal.         Note written with Jeri MERRITT

## 2025-01-08 NOTE — PROGRESS NOTES
Ultrasound results reviewed and note is made of the small uterine fibroids.  In addition the endometrial stripe is noted to be 16 mm.  Ovaries were noted to be normal size shape and appearance.    Results discussed with patient and she was reassured of the findings.    All questions were answered for her during today's visit    She will be seen for routine gynecologic follow-up the end of spring 2025    Patient knows to call for any problems, questions, issues or concerns which may arise for her

## 2025-01-19 DIAGNOSIS — J01.00 ACUTE MAXILLARY SINUSITIS, RECURRENCE NOT SPECIFIED: ICD-10-CM

## 2025-01-21 RX ORDER — FLUTICASONE PROPIONATE 50 MCG
SPRAY, SUSPENSION (ML) NASAL
Qty: 48 ML | Refills: 1 | OUTPATIENT
Start: 2025-01-21

## 2025-02-24 ENCOUNTER — OFFICE VISIT (OUTPATIENT)
Age: 55
End: 2025-02-24
Payer: COMMERCIAL

## 2025-02-24 VITALS — WEIGHT: 201.2 LBS | BODY MASS INDEX: 31.51 KG/M2 | SYSTOLIC BLOOD PRESSURE: 132 MMHG | DIASTOLIC BLOOD PRESSURE: 94 MMHG

## 2025-02-24 DIAGNOSIS — Z87.42 HISTORY OF ENDOMETRIAL HYPERPLASIA: ICD-10-CM

## 2025-02-24 DIAGNOSIS — D25.2 SUBSEROUS LEIOMYOMA OF UTERUS: Primary | ICD-10-CM

## 2025-02-24 DIAGNOSIS — D68.51 FACTOR V LEIDEN MUTATION (HCC): ICD-10-CM

## 2025-02-24 DIAGNOSIS — D05.12 DUCTAL CARCINOMA IN SITU (DCIS) OF LEFT BREAST: ICD-10-CM

## 2025-02-24 PROCEDURE — 99214 OFFICE O/P EST MOD 30 MIN: CPT | Performed by: OBSTETRICS & GYNECOLOGY

## 2025-02-24 RX ORDER — TRIFAROTENE 50 UG/G
CREAM TOPICAL
COMMUNITY
Start: 2024-11-22

## 2025-02-24 RX ORDER — ALPRAZOLAM 0.25 MG
0.25 TABLET ORAL
COMMUNITY
Start: 2025-02-19

## 2025-02-25 ENCOUNTER — TELEPHONE (OUTPATIENT)
Age: 55
End: 2025-02-25

## 2025-02-25 PROBLEM — D25.2 SUBSEROUS LEIOMYOMA OF UTERUS: Status: ACTIVE | Noted: 2025-02-25

## 2025-02-25 PROBLEM — R20.9 SKIN SENSATION DISTURBANCE: Status: RESOLVED | Noted: 2022-10-17 | Resolved: 2025-02-25

## 2025-02-25 PROBLEM — D05.12 DUCTAL CARCINOMA IN SITU (DCIS) OF LEFT BREAST: Status: ACTIVE | Noted: 2024-05-03

## 2025-02-25 NOTE — TELEPHONE ENCOUNTER
"As per Dr. Perry Miller,     \"Please tell patient I was able to find ultrasound report from Dr. Bazzi's office.     Her endometrium in 16 mm - should be < 5 mm.  She has a history of endometrial hyperplasia     I would like her to see me sooner than April for EMB     Fibroid noted; no other concerns. \"    Patient called, no answer. Left VM for patient to return call.  "

## 2025-02-25 NOTE — ASSESSMENT & PLAN NOTE
After reviewing the details of the sono report I feel that the fibroid is of no concern and could be managed expectantly with sono surveillance.  Hysterectomy has its adherent risks and patient does carry heterozygosity for Factor V Leiden.  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  However, I am concerned about the thickness of her endometrium given her history.  Will advise patient to have EMB.  If benign then can plan yearly sono surveillance of fibroid.  Stagger breast exam/annual q 6 months with Oncology.

## 2025-02-25 NOTE — PROGRESS NOTES
Name: Mariola Briscoe      : 1970      MRN: [id number]  Encounter Provider: Roxy Barrientos MD  Encounter Date: 2025   Encounter department: [facility]  :  Assessment & Plan  Factor V Leiden mutation (HCC)    History of endometrial hyperplasia    Subserous leiomyoma of uterus    Ductal carcinoma in situ (DCIS) of left breast    After reviewing the details of the sono report I feel that the fibroid is of no concern and could be managed expectantly with sono surveillance.  Hysterectomy has its adherent risks and patient does carry heterozygosity for Factor V Leiden.  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  However, I am concerned about the thickness of her endometrium given her history.  Will advise patient to have EMB.  If benign then can plan yearly sono surveillance of fibroid.  Stagger breast exam/annual q 6 months with Oncology.      History of Present Illness     Mariola Briscoe is a 54 y.o. female who presents for second opinion regarding management of fibroid.  Patient has a history significant for POSSIBLE endometrial hyperplasia - documented in  but no correlating pathology found.  She also had DCIS of left breast treated with mastectomy in .  She had an in-office sono due to her perimenopausal bleeding pattern 2025.  5 cm subserosal fibroid incidentally noted.  Outside surgical oncology PA recommended that the patient has further evaluation.  The patient is looking for recommendations regarding fibroid management.      GYN History:  Menses 23-40+ days, heavy x 2-3 days  Contraception:  tubal  No history of abnormal Paps or STD's    Past Medical History   Past Medical History:   Diagnosis Date    BRCA1 negative     BRCA2 negative     Bulging lumbar disc     L1-L2    Endometrial hyperplasia     endometrial polyp    Factor V deficiency (HCC) 2005    Factor V Leiden (HCC)     GERD (gastroesophageal reflux disease)     Hydrops     eye    RSD  lower limb      Past Surgical History:   Procedure Laterality Date    BREAST BIOPSY Left 2023    stereo- adh    BREAST LUMPECTOMY Left 2023    done at [facility]- non invasive     SECTION      EGD  2020    Dr. Calderon.  Symptoms of reflux after antibiotics.  Biopsies negative for H. pylori, negative eosinophilic esophagitis, positive for reflux esophagitis.    FOOT SURGERY      MAMMO NEEDLE LOCALIZATION LEFT (ALL INC) Left 2023    MAMMO STEREOTACTIC BREAST BIOPSY LEFT (ALL INC) Left 2023    MOUTH SURGERY      FL EXC TUMOR SOFT TISS FACE&SCALP SUBFASCIAL <2CM N/A 2017    Procedure: EXCISION FOREHEAD MASS;  Surgeon: Ben Guerra MD;  Location: [facility];  Service: Plastics    TUBAL LIGATION       Family History   Problem Relation Age of Onset    Colon polyps Mother     Diabetes Mother     Hypertension Mother     Aneurysm Father     Hypertension Father     Heart disease Father     Stroke Father     No Known Problems Daughter     Breast cancer Maternal Grandmother         unsure what age    No Known Problems Maternal Grandfather     No Known Problems Paternal Grandmother     No Known Problems Paternal Grandfather     Thyroid cancer Brother     Breast cancer Maternal Aunt 75        mothers twin sister    No Known Problems Maternal Aunt     No Known Problems Maternal Aunt     No Known Problems Maternal Aunt     Breast cancer Other 36        maternal    Colon cancer Neg Hx     Endometrial cancer Neg Hx     Ovarian cancer Neg Hx        Current Outpatient Medications   Medication Instructions    acyclovir (ZOVIRAX) 5 % cream APPLY TO AFFECTED AREA 5 TIMES EVERY DAY    Aklief 0.005 % CREA Apply externally    ALPRAZolam (XANAX) 0.25 mg    amLODIPine (NORVASC) 5 mg, Oral, Daily    benzonatate (TESSALON PERLES) 100 mg, Oral, 3 times daily PRN    carboxymethylcellulose (REFRESH PLUS) 0.5 % SOLN 1 drop    cycloSPORINE (RESTASIS) 0.05 % ophthalmic emulsion 1 drop, Every 12 hours    famotidine  (PEPCID) 40 mg, Oral, 2 times daily    ferrous sulfate 324 mg, Oral, Daily before breakfast    fluticasone (FLONASE) 50 mcg/act nasal spray 2 sprays, Nasal, Daily    hydroCHLOROthiazide 12.5 mg, Daily    metoprolol succinate (TOPROL-XL) 50 mg, Oral, 2 times daily    multivitamin (THERAGRAN) TABS 1 tablet, Every evening    Semaglutide-Weight Management (WEGOVY) 1 MG/0.5ML Inject under the skin    sertraline (ZOLOFT) 50 mg tablet Daily    Tazorac 0.05 % gel APPLY TOPICALLY TO ENTIRE FACE EVERY NIGHT BRAND NECESSARY    tobramycin-dexamethasone (TOBRADEX) ophthalmic suspension     tretinoin (RETIN-A) 0.05 % cream APPLY THIN LAYER TOPICALLY TO CLEAN, DRY FACE EVERY NIGHT AT BEDTIME     Allergies   Allergen Reactions    Pantoprazole Hives and Anaphylaxis    Epinephrine Other (See Comments)     Numbness feeling throughout body    Diflucan [Fluconazole]     Erythromycin GI Intolerance    Nickel Other (See Comments)     infection    Tetracycline Vomiting    Cefpodoxime Rash    Iodinated Contrast Media Rash     Patient stated rash on her  arm where they injected the  dye      Social History     Tobacco Use    Smoking status: Former     Current packs/day: 0.00     Types: Cigarettes     Start date:      Quit date:      Years since quittin.1    Smokeless tobacco: Never   Vaping Use    Vaping status: Never Used   Substance and Sexual Activity    Alcohol use: Not Currently     Comment: rarely    Drug use: No    Sexual activity: Yes        Review of Systems   Constitutional: Negative for chills, decreased appetite, fever and malaise/fatigue.   Gastrointestinal:  Negative for abdominal pain, change in bowel habit, nausea and vomiting.   Genitourinary:  Negative for flank pain, missed menses, non-menstrual bleeding and pelvic pain.   Neurological:  Negative for dizziness, headaches, light-headedness and weakness.         Objective   /94 (BP Location: Right arm, Patient Position: Sitting, Cuff Size: Large)   Wt  91.3 kg (201 lb 3.2 oz)   LMP 2025 (Exact Date)   BMI 31.51 kg/m²      Physical Exam  Constitutional:       Appearance: Normal appearance.   HENT:      Head: Normocephalic.   Cardiovascular:      Rate and Rhythm: Normal rate and regular rhythm.   Pulmonary:      Effort: Pulmonary effort is normal.   Abdominal:      General: There is no distension.   Musculoskeletal:         General: No swelling.   Neurological:      General: No focal deficit present.      Mental Status: She is alert and oriented to person, place, and time.   Skin:     General: Skin is warm and dry.      Coloration: Skin is not jaundiced or pale.   Psychiatric:         Mood and Affect: Mood normal.         Behavior: Behavior normal.   Vitals and nursing note reviewed.      Pelvic Sono 2025:  Procedure details:     SIS Procedure: No    Technique:  Transvaginal US, Non-OB    Position: lithotomy exam    Uterine findings:     Length (cm): 11.98    Height (cm):  6    Width (cm):  7.57    Endometrial stripe: identified      Endometrium thickness (mm):  16.12  Left ovary findings:     Left ovary:  Visualized    Length (cm): 3.88    Height (cm): 2.29    Width (cm): 2.28  Right ovary findings:     Right ovary:  Visualized    Length (cm): 3.08    Height (cm): 1.67    Width (cm): 2.1  Other findings:     Free pelvic fluid: not identified      Free peritoneal fluid: not identified    Post-Procedure Details:     Impression:  Anteverted uterus demonstrates a right subserosal fibroid at the lower uterine segment, 5.8cm and a posterior left intramural lower uterine segment fibroid, 1.6cm. The endometrium is thickened and echogenic. The  scar appears 6.8mm from the cervical canal and just inferior to the largest fibroid. The bilateral ovaries appear within normal limits. No free fluid.     Tolerance:  Tolerated well, no immediate complications    Complications: no complications

## 2025-02-26 ENCOUNTER — OFFICE VISIT (OUTPATIENT)
Dept: OBGYN CLINIC | Facility: CLINIC | Age: 55
End: 2025-02-26
Payer: COMMERCIAL

## 2025-02-26 VITALS
DIASTOLIC BLOOD PRESSURE: 100 MMHG | BODY MASS INDEX: 31.45 KG/M2 | WEIGHT: 200.8 LBS | SYSTOLIC BLOOD PRESSURE: 140 MMHG

## 2025-02-26 DIAGNOSIS — D68.51 FACTOR V LEIDEN MUTATION (HCC): ICD-10-CM

## 2025-02-26 DIAGNOSIS — N92.4 MENORRHAGIA, PREMENOPAUSAL: Primary | ICD-10-CM

## 2025-02-26 DIAGNOSIS — D05.12 DUCTAL CARCINOMA IN SITU (DCIS) OF LEFT BREAST: ICD-10-CM

## 2025-02-26 PROCEDURE — 99213 OFFICE O/P EST LOW 20 MIN: CPT | Performed by: OBSTETRICS & GYNECOLOGY

## 2025-02-26 NOTE — PATIENT INSTRUCTIONS
Discussed option of endometrial ablation to mitigate the bleeding   Will obtain endometrial biopsy in office prior to procedure   Will put in referral for ablation   Ruled out progesterone as a treatment option due to patient's history of breast cancer  Will order right-sided screening mammogram in the EMR    Reach out to the office with any questions or concerns   All information is available in patient's MyChart

## 2025-02-26 NOTE — PROGRESS NOTES
:  Assessment & Plan  Menorrhagia, premenopausal    Plan:  Discussed option of endometrial ablation to mitigate the bleeding   Will obtain endometrial biopsy in office prior to procedure   Will put in referral for ablation   Ruled out progesterone as a treatment option due to patient's history of breast cancer  Will order right-sided screening mammogram in the EMR    Reach out to the office with any questions or concerns   All information is available in patient's MyChart       Ductal carcinoma in situ (DCIS) of left breast         Factor V Leiden mutation (HCC)             History of Present Illness     Mariola Briscoe is a 54 y.o. female   Mariola Briscoe is a 54 year old  female with a past medical history of factor V leiden, iron deficiency anemia, and DCIS of left breast, who is presenting today for follow-up for her pelvic ultrasound.     Patient is still currently menstruating and has a history of heavy menses that have caused her to be anemic, requiring transfusions in the past.     Most recent ultrasound showed an endometrial lining of 16 mm. Patent was told by another provider that she needed an endometrial biopsy based on this number, but patient wanted a second opinion.     Patient had an endometrial biopsy a number of years ago which did not show any evidence of hyperplasia at all.    I advised the patient that in light of her still having her menstrual cycles, that an endometrial lining value of 16 mm can be normal.    However in light of the fact that she apparently is menstruating quite heavily to the point of needing iron infusions we need to evaluate her and consider some type of therapeutic intervention.    In light of the fact that she has a factor V Leiden mutation and history of breast DCIS, we will not be able to utilize any type of hormonal management.  Endometrial ablation may be an excellent alternative for her at this time.    Because of that, we will obtain a baseline  endometrial biopsy at this time and then refer her to Dr. Sanjeev Sandoval for consultation for probable endometrial ablation.    She and her  are content with this plan of therapy and we will schedule her EBX at today's visit.    Questions were answered in detail for both of them at today's visit    As always she knows to feel free to call for any problems, questions, issues or concerns which may arise for her      Total time of today's visit was 20 minutes of which greater than 50% was spent face-to-face counseling the patient as well as coordination of care, review of chart and lab values, physical examination as well as computer entry into the [facility] medical record system.            Review of Systems   Constitutional: Negative.  Negative for appetite change, diaphoresis, fatigue, fever and unexpected weight change.   HENT: Negative.     Eyes: Negative.    Respiratory: Negative.     Cardiovascular: Negative.    Gastrointestinal: Negative.  Negative for abdominal pain, blood in stool, constipation, diarrhea, nausea and vomiting.   Endocrine: Negative.  Negative for cold intolerance and heat intolerance.   Genitourinary:  Positive for menstrual problem (menorrhagia). Negative for dysuria, frequency, hematuria, urgency, vaginal bleeding, vaginal discharge and vaginal pain.   Musculoskeletal: Negative.    Skin: Negative.    Allergic/Immunologic: Negative.    Neurological: Negative.    Hematological: Negative.  Negative for adenopathy.   Psychiatric/Behavioral: Negative.       Objective   /100   Wt 91.1 kg (200 lb 12.8 oz)   LMP 02/08/2025 (Exact Date)   BMI 31.45 kg/m²      Physical Exam  Constitutional:       Appearance: Normal appearance.   HENT:      Head: Normocephalic and atraumatic.   Eyes:      Pupils: Pupils are equal, round, and reactive to light.   Cardiovascular:      Rate and Rhythm: Normal rate and regular rhythm.   Pulmonary:      Effort: Pulmonary effort is normal.      Breath sounds:  Normal breath sounds.   Abdominal:      Palpations: Abdomen is soft.   Musculoskeletal:         General: Normal range of motion.      Cervical back: Normal range of motion and neck supple.   Skin:     General: Skin is warm and dry.   Neurological:      General: No focal deficit present.      Mental Status: She is alert and oriented to person, place, and time.   Psychiatric:         Mood and Affect: Mood normal.         Behavior: Behavior normal.         Thought Content: Thought content normal.         Judgment: Judgment normal.

## 2025-02-28 ENCOUNTER — PROCEDURE VISIT (OUTPATIENT)
Dept: OBGYN CLINIC | Facility: CLINIC | Age: 55
End: 2025-02-28
Payer: COMMERCIAL

## 2025-02-28 DIAGNOSIS — D05.12 DUCTAL CARCINOMA IN SITU (DCIS) OF LEFT BREAST: ICD-10-CM

## 2025-02-28 DIAGNOSIS — N93.9 ABNORMAL UTERINE BLEEDING (AUB): Primary | ICD-10-CM

## 2025-02-28 DIAGNOSIS — N92.4 MENORRHAGIA, PREMENOPAUSAL: ICD-10-CM

## 2025-02-28 DIAGNOSIS — Z01.812 ENCOUNTER FOR PRE-OPERATIVE LABORATORY TESTING: ICD-10-CM

## 2025-02-28 LAB — SL AMB POCT URINE HCG: NEGATIVE

## 2025-02-28 PROCEDURE — 81025 URINE PREGNANCY TEST: CPT | Performed by: OBSTETRICS & GYNECOLOGY

## 2025-02-28 PROCEDURE — 58100 BIOPSY OF UTERUS LINING: CPT | Performed by: OBSTETRICS & GYNECOLOGY

## 2025-02-28 NOTE — PROGRESS NOTES
Endometrial biopsy    Date/Time: 2/28/2025 1:00 PM    Performed by: Tiago Bazzi MD  Authorized by: Tiago Bazzi MD  Universal Protocol:  procedure performed by consultantConsent: Verbal consent obtained.  Risks and benefits: risks, benefits and alternatives were discussed  Consent given by: patient  Patient understanding: patient states understanding of the procedure being performed  Patient consent: the patient's understanding of the procedure matches consent given  Procedure consent: procedure consent matches procedure scheduled  Relevant documents: relevant documents present and verified  Test results: test results available and properly labeled  Site marked: the operative site was not marked  Radiology Images displayed and confirmed. If images not available, report reviewed: imaging studies available  Required items: required blood products, implants, devices, and special equipment available  Patient identity confirmed: verbally with patient    Indication:     Indications: Other disorder of menstruation and other abnormal bleeding from female genital tract    Pre-procedure:     Premeds:  Acetaminophen  Procedure:     Procedure: endometrial biopsy with [place]      A bivalve speculum was placed in the vagina: yes      A paracervical block was performed: no      An intracervical block was performed: no      The cervix was dilated: no      Uterus sounded: yes      Uterus sound depth (cm):  10    Specimen collected: specimen collected and sent to pathology      Patient tolerated procedure well with no complications: yes    Findings:     Uterus size:  9-10 weeks    Cervix: normal      Adnexa: normal    Comments:     Procedure comments:  Topic: Perimenopausal bleeding     Procedure: Endometrial biopsy    Excellent hemostasis noted     Instructions and restrictions given     All questions answered     Further treatment and follow-up planning will be based upon final pathology results     Patient to  call for any problems, questions, issues or concerns which may arise for her

## 2025-02-28 NOTE — PATIENT INSTRUCTIONS
Topic: Perimenopausal bleeding     Procedure: Endometrial biopsy    Excellent hemostasis noted     Instructions and restrictions given     All questions answered     Further treatment and follow-up planning will be based upon final pathology results     Patient to call for any problems, questions, issues or concerns which may arise for her

## 2025-03-04 LAB
CLINICAL INFO: NORMAL
PATH REPORT.COMMENTS IMP SPEC: NORMAL
SPECIMEN SOURCE: NORMAL

## 2025-03-06 ENCOUNTER — RESULTS FOLLOW-UP (OUTPATIENT)
Dept: OBGYN CLINIC | Facility: CLINIC | Age: 55
End: 2025-03-06

## 2025-03-18 ENCOUNTER — OFFICE VISIT (OUTPATIENT)
Dept: CARDIOLOGY CLINIC | Facility: CLINIC | Age: 55
End: 2025-03-18
Payer: COMMERCIAL

## 2025-03-18 VITALS
HEART RATE: 97 BPM | DIASTOLIC BLOOD PRESSURE: 110 MMHG | BODY MASS INDEX: 31.95 KG/M2 | SYSTOLIC BLOOD PRESSURE: 170 MMHG | WEIGHT: 204 LBS | OXYGEN SATURATION: 100 %

## 2025-03-18 DIAGNOSIS — E78.5 DYSLIPIDEMIA: ICD-10-CM

## 2025-03-18 DIAGNOSIS — R00.2 PALPITATIONS: ICD-10-CM

## 2025-03-18 DIAGNOSIS — I10 PRIMARY HYPERTENSION: Primary | ICD-10-CM

## 2025-03-18 PROCEDURE — 99214 OFFICE O/P EST MOD 30 MIN: CPT | Performed by: INTERNAL MEDICINE

## 2025-03-18 RX ORDER — AMLODIPINE BESYLATE 5 MG/1
5 TABLET ORAL DAILY
Qty: 90 TABLET | Refills: 3 | Status: SHIPPED | OUTPATIENT
Start: 2025-03-18

## 2025-03-18 NOTE — PROGRESS NOTES
Cardiology   Mariola Briscoe 54 y.o. female MRN: [id number]        Impression:  1. Hypertension - not adequate control.  Will continue metoprolol and start amlodipine 5mg daily.   2. Dyslipidemia - .  HDL 31  3. Palpitations - resolved.     Recommendations:  1. Start amlodipine 5mg daily.  2. Continue remainder of medications.  3. Follow up in 3 months.         HPI: Mariola Briscoe is a 54 y.o. year old female with hypertension and dyslipidemia who presents for follow up. No chest pain or shortness of breath. Echo demonstrated normal LV systolic function, mild LVH. No headaches.  No chest pain, shortness of breath.  Rare palpitations.         Review of Systems   Constitutional: Negative.    HENT: Negative.     Eyes: Negative.    Respiratory:  Negative for chest tightness and shortness of breath.    Cardiovascular:  Positive for palpitations. Negative for chest pain and leg swelling.   Gastrointestinal: Negative.    Endocrine: Negative.    Genitourinary: Negative.    Musculoskeletal: Negative.    Skin: Negative.    Allergic/Immunologic: Negative.    Neurological: Negative.    Hematological: Negative.    Psychiatric/Behavioral: Negative.     All other systems reviewed and are negative.        Past Medical History:   Diagnosis Date    BRCA1 negative     BRCA2 negative     Bulging lumbar disc     L1-L2    Endometrial hyperplasia     endometrial polyp    Factor V deficiency ([facility]) 2005    Factor V Leiden ([facility])     GERD (gastroesophageal reflux disease)     Hydrops     eye    RSD lower limb      Past Surgical History:   Procedure Laterality Date    BREAST BIOPSY Left 2023    stereo- adh    BREAST LUMPECTOMY Left 2023    done at [facility]- non invasive     SECTION      EGD  2020    Dr. Calderon.  Symptoms of reflux after antibiotics.  Biopsies negative for H. pylori, negative eosinophilic esophagitis, positive for reflux esophagitis.    FOOT SURGERY      MAMMO NEEDLE LOCALIZATION LEFT (ALL  INC) Left 2023    MAMMO STEREOTACTIC BREAST BIOPSY LEFT (ALL INC) Left 2023    MOUTH SURGERY      MT EXC TUMOR SOFT TISS FACE&SCALP SUBFASCIAL <2CM N/A 2017    Procedure: EXCISION FOREHEAD MASS;  Surgeon: Ben Guerra MD;  Location: [facility];  Service: Plastics    TUBAL LIGATION       Social History     Substance and Sexual Activity   Alcohol Use Not Currently    Comment: rarely     Social History     Substance and Sexual Activity   Drug Use No     Social History     Tobacco Use   Smoking Status Former    Current packs/day: 0.00    Types: Cigarettes    Start date:     Quit date:     Years since quittin.2   Smokeless Tobacco Never     Family History   Problem Relation Age of Onset    Colon polyps Mother     Diabetes Mother     Hypertension Mother     Aneurysm Father     Hypertension Father     Heart disease Father     Stroke Father     No Known Problems Daughter     Breast cancer Maternal Grandmother         unsure what age    No Known Problems Maternal Grandfather     No Known Problems Paternal Grandmother     No Known Problems Paternal Grandfather     Thyroid cancer Brother     Breast cancer Maternal Aunt 75        mothers twin sister    No Known Problems Maternal Aunt     No Known Problems Maternal Aunt     No Known Problems Maternal Aunt     Breast cancer Other 36        maternal    Colon cancer Neg Hx     Endometrial cancer Neg Hx     Ovarian cancer Neg Hx        Allergies:  Allergies   Allergen Reactions    Pantoprazole Hives and Anaphylaxis    Epinephrine Other (See Comments)     Numbness feeling throughout body    Diflucan [Fluconazole]     Erythromycin GI Intolerance    Nickel Other (See Comments)     infection    Tetracycline Vomiting    Cefpodoxime Rash    Iodinated Contrast Media Rash     Patient stated rash on her  arm where they injected the  dye       Medications:     Current Outpatient Medications:     acyclovir (ZOVIRAX) 5 % cream, APPLY TO AFFECTED AREA 5 TIMES  EVERY DAY, Disp: , Rfl:     Aklief 0.005 % CREA, Apply topically, Disp: , Rfl:     ALPRAZolam (XANAX) 0.25 mg tablet, 0.25 mg, Disp: , Rfl:     famotidine (PEPCID) 40 MG tablet, Take 1 tablet (40 mg total) by mouth 2 (two) times a day, Disp: 60 tablet, Rfl: 3    ferrous sulfate 324 (65 Fe) mg, Take 1 tablet (324 mg total) by mouth daily before breakfast Do not start before December 26, 2022., Disp: 30 tablet, Rfl: 0    metoprolol succinate (TOPROL-XL) 50 mg 24 hr tablet, Take 1 tablet (50 mg total) by mouth 2 (two) times a day (Patient taking differently: Take 50 mg by mouth daily 25mg), Disp: 180 tablet, Rfl: 3    multivitamin (THERAGRAN) TABS, Take 1 tablet by mouth every evening, Disp: , Rfl:     Semaglutide-Weight Management (WEGOVY) 1 MG/0.5ML, Inject under the skin, Disp: , Rfl:     sertraline (ZOLOFT) 50 mg tablet, daily, Disp: , Rfl:     Tazorac 0.05 % gel, APPLY TOPICALLY TO ENTIRE FACE EVERY NIGHT BRAND NECESSARY, Disp: , Rfl:     tretinoin (RETIN-A) 0.05 % cream, APPLY THIN LAYER TOPICALLY TO CLEAN, DRY FACE EVERY NIGHT AT BEDTIME, Disp: , Rfl:     amLODIPine (NORVASC) 5 mg tablet, Take 1 tablet (5 mg total) by mouth daily (Patient not taking: Reported on 2/24/2025), Disp: 90 tablet, Rfl: 3    benzonatate (TESSALON PERLES) 100 mg capsule, Take 1 capsule (100 mg total) by mouth 3 (three) times a day as needed for cough (Patient not taking: Reported on 2/24/2025), Disp: 20 capsule, Rfl: 0    carboxymethylcellulose (REFRESH PLUS) 0.5 % SOLN, Apply 1 drop to eye (Patient not taking: Reported on 2/24/2025), Disp: , Rfl:     cycloSPORINE (RESTASIS) 0.05 % ophthalmic emulsion, Administer 1 drop to both eyes every 12 (twelve) hours (Patient not taking: Reported on 12/28/2024), Disp: , Rfl:     fluticasone (FLONASE) 50 mcg/act nasal spray, 2 sprays into each nostril daily (Patient not taking: Reported on 2/24/2025), Disp: 16 g, Rfl: 0    hydrochlorothiazide (HYDRODIURIL) 12.5 mg tablet, Take 12.5 mg by mouth daily  "(Patient not taking: Reported on 10/2/2023), Disp: , Rfl:     tobramycin-dexamethasone (TOBRADEX) ophthalmic suspension, , Disp: , Rfl:       Wt Readings from Last 3 Encounters:   03/18/25 92.5 kg (204 lb)   02/26/25 91.1 kg (200 lb 12.8 oz)   02/24/25 91.3 kg (201 lb 3.2 oz)     Temp Readings from Last 3 Encounters:   01/31/24 (!) 97.3 °F (36.3 °C) (Temporal)   11/13/23 98.6 °F (37 °C) (Oral)   10/02/23 98.5 °F (36.9 °C) (Temporal)     BP Readings from Last 3 Encounters:   03/18/25 (!) 170/110   02/26/25 140/100   02/24/25 132/94     Pulse Readings from Last 3 Encounters:   03/18/25 97   10/10/24 72   01/31/24 100         Physical Exam  HENT:      Head: Atraumatic.      Mouth/Throat:      Mouth: Mucous membranes are moist.   Eyes:      Extraocular Movements: Extraocular movements intact.   Cardiovascular:      Rate and Rhythm: Normal rate and regular rhythm.      Heart sounds: Normal heart sounds.   Pulmonary:      Effort: Pulmonary effort is normal.      Breath sounds: Normal breath sounds.   Abdominal:      General: Abdomen is flat.   Musculoskeletal:         General: Normal range of motion.      Cervical back: Normal range of motion.   Skin:     General: Skin is warm.   Neurological:      General: No focal deficit present.      Mental Status: She is alert and oriented to person, place, and time.   Psychiatric:         Mood and Affect: Mood normal.         Behavior: Behavior normal.           Laboratory Studies:  CMP:  Lab Results   Component Value Date    K 4.3 02/01/2025     02/01/2025    CO2 28 02/01/2025    BUN 12 02/01/2025    CREATININE 0.76 02/01/2025    AST 15 02/01/2025    ALT 22 02/01/2025    EGFR 94 02/01/2025       Lipid Profile:   No results found for: \"CHOL\"  Lab Results   Component Value Date    HDL 44 07/16/2020     Lab Results   Component Value Date    LDLCALC 187 (H) 07/16/2020     Lab Results   Component Value Date    TRIG 184 (H) 07/16/2020       Cardiac testing:   EKG reviewed " personally:

## 2025-03-18 NOTE — PATIENT INSTRUCTIONS
Recommendations:  1. Start amlodipine 5mg daily.  2. Continue remainder of medications.  3. Follow up in 3 months.

## 2025-05-19 ENCOUNTER — APPOINTMENT (EMERGENCY)
Dept: RADIOLOGY | Facility: HOSPITAL | Age: 55
End: 2025-05-19
Payer: COMMERCIAL

## 2025-05-19 ENCOUNTER — APPOINTMENT (EMERGENCY)
Dept: CT IMAGING | Facility: HOSPITAL | Age: 55
End: 2025-05-19
Payer: COMMERCIAL

## 2025-05-19 ENCOUNTER — HOSPITAL ENCOUNTER (EMERGENCY)
Facility: HOSPITAL | Age: 55
Discharge: HOME | End: 2025-05-19
Attending: EMERGENCY MEDICINE
Payer: COMMERCIAL

## 2025-05-19 VITALS
WEIGHT: 205.47 LBS | OXYGEN SATURATION: 99 % | HEART RATE: 72 BPM | DIASTOLIC BLOOD PRESSURE: 72 MMHG | BODY MASS INDEX: 32.18 KG/M2 | TEMPERATURE: 97.9 F | SYSTOLIC BLOOD PRESSURE: 156 MMHG | RESPIRATION RATE: 15 BRPM

## 2025-05-19 DIAGNOSIS — K29.70 GASTRITIS: ICD-10-CM

## 2025-05-19 DIAGNOSIS — R07.9 CHEST PAIN, UNSPECIFIED: Primary | ICD-10-CM

## 2025-05-19 LAB
ALBUMIN SERPL BCG-MCNC: 4.2 G/DL (ref 3.5–5)
ALP SERPL-CCNC: 63 U/L (ref 34–104)
ALT SERPL W P-5'-P-CCNC: 20 U/L (ref 7–52)
ANION GAP SERPL CALCULATED.3IONS-SCNC: 6 MMOL/L (ref 4–13)
AST SERPL W P-5'-P-CCNC: 16 U/L (ref 13–39)
BASOPHILS # BLD AUTO: 0.01 THOUSANDS/ÂΜL (ref 0–0.1)
BASOPHILS NFR BLD AUTO: 0 % (ref 0–1)
BILIRUB SERPL-MCNC: 0.56 MG/DL (ref 0.2–1)
BUN SERPL-MCNC: 14 MG/DL (ref 5–25)
CALCIUM SERPL-MCNC: 9.4 MG/DL (ref 8.4–10.2)
CARDIAC TROPONIN I PNL SERPL HS: <2 NG/L (ref ?–50)
CHLORIDE SERPL-SCNC: 104 MMOL/L (ref 96–108)
CO2 SERPL-SCNC: 27 MMOL/L (ref 21–32)
CREAT SERPL-MCNC: 0.78 MG/DL (ref 0.6–1.3)
EOSINOPHIL # BLD AUTO: 0.22 THOUSAND/ÂΜL (ref 0–0.61)
EOSINOPHIL NFR BLD AUTO: 4 % (ref 0–6)
ERYTHROCYTE [DISTWIDTH] IN BLOOD BY AUTOMATED COUNT: 13 % (ref 11.6–15.1)
GFR SERPL CREATININE-BSD FRML MDRD: 87 ML/MIN/1.73SQ M
GLUCOSE SERPL-MCNC: 95 MG/DL (ref 65–140)
HCT VFR BLD AUTO: 40.2 % (ref 34.8–46.1)
HGB BLD-MCNC: 13.2 G/DL (ref 11.5–15.4)
IMM GRANULOCYTES # BLD AUTO: 0.01 THOUSAND/UL (ref 0–0.2)
IMM GRANULOCYTES NFR BLD AUTO: 0 % (ref 0–2)
LIPASE SERPL-CCNC: 49 U/L (ref 11–82)
LYMPHOCYTES # BLD AUTO: 1.63 THOUSANDS/ÂΜL (ref 0.6–4.47)
LYMPHOCYTES NFR BLD AUTO: 28 % (ref 14–44)
MCH RBC QN AUTO: 30.1 PG (ref 26.8–34.3)
MCHC RBC AUTO-ENTMCNC: 32.8 G/DL (ref 31.4–37.4)
MCV RBC AUTO: 92 FL (ref 82–98)
MONOCYTES # BLD AUTO: 0.51 THOUSAND/ÂΜL (ref 0.17–1.22)
MONOCYTES NFR BLD AUTO: 9 % (ref 4–12)
NEUTROPHILS # BLD AUTO: 3.48 THOUSANDS/ÂΜL (ref 1.85–7.62)
NEUTS SEG NFR BLD AUTO: 59 % (ref 43–75)
NRBC BLD AUTO-RTO: 0 /100 WBCS
PLATELET # BLD AUTO: 303 THOUSANDS/UL (ref 149–390)
PMV BLD AUTO: 10.7 FL (ref 8.9–12.7)
POTASSIUM SERPL-SCNC: 3.6 MMOL/L (ref 3.5–5.3)
PROT SERPL-MCNC: 8 G/DL (ref 6.4–8.4)
RBC # BLD AUTO: 4.38 MILLION/UL (ref 3.81–5.12)
SODIUM SERPL-SCNC: 137 MMOL/L (ref 135–147)
WBC # BLD AUTO: 5.86 THOUSAND/UL (ref 4.31–10.16)

## 2025-05-19 PROCEDURE — 36415 COLL VENOUS BLD VENIPUNCTURE: CPT

## 2025-05-19 PROCEDURE — 84484 ASSAY OF TROPONIN QUANT: CPT

## 2025-05-19 PROCEDURE — 71046 X-RAY EXAM CHEST 2 VIEWS: CPT

## 2025-05-19 PROCEDURE — 85025 COMPLETE CBC W/AUTO DIFF WBC: CPT

## 2025-05-19 PROCEDURE — 93005 ELECTROCARDIOGRAM TRACING: CPT

## 2025-05-19 PROCEDURE — 80053 COMPREHEN METABOLIC PANEL: CPT

## 2025-05-19 PROCEDURE — 99285 EMERGENCY DEPT VISIT HI MDM: CPT

## 2025-05-19 PROCEDURE — 83690 ASSAY OF LIPASE: CPT | Performed by: EMERGENCY MEDICINE

## 2025-05-19 PROCEDURE — 99285 EMERGENCY DEPT VISIT HI MDM: CPT | Performed by: EMERGENCY MEDICINE

## 2025-05-19 PROCEDURE — 74176 CT ABD & PELVIS W/O CONTRAST: CPT

## 2025-05-19 RX ORDER — MAGNESIUM HYDROXIDE/ALUMINUM HYDROXICE/SIMETHICONE 120; 1200; 1200 MG/30ML; MG/30ML; MG/30ML
30 SUSPENSION ORAL ONCE
Status: COMPLETED | OUTPATIENT
Start: 2025-05-19 | End: 2025-05-19

## 2025-05-19 RX ORDER — SUCRALFATE 1 G/1
1 TABLET ORAL 4 TIMES DAILY
Qty: 120 TABLET | Refills: 0 | Status: SHIPPED | OUTPATIENT
Start: 2025-05-19

## 2025-05-19 RX ADMIN — ALUMINUM HYDROXIDE, MAGNESIUM HYDROXIDE, AND DIMETHICONE 30 ML: 200; 20; 200 SUSPENSION ORAL at 19:30

## 2025-05-19 NOTE — ED PROVIDER NOTES
Time reflects when diagnosis was documented in both MDM as applicable and the Disposition within this note       Time User Action Codes Description Comment    5/19/2025 11:24 PM Zhao Jewell Add [R07.9] Chest pain, unspecified     5/19/2025 11:24 PM Zhao Jewell Add [K29.70] Gastritis           ED Disposition       ED Disposition   Discharge    Condition   Stable    Date/Time   Mon May 19, 2025 11:24 PM    Comment   Mariola Briscoe discharge to home/self care.                   Assessment & Plan       Medical Decision Making  Patient ruled out for acute myocardial infarction.  Heart score is 3.  EKG is normal.  As per [facility] protocol appropriate for discharge and outpatient management.  This pain would be very atypical for dissection or pulmonary embolism.  There was no pneumonia or pneumothorax on chest x-ray.  Nothing on EKG or laboratory evaluation to suggest pericarditis or myocarditis.  Imaging of the abdomen was unremarkable.  No bowel obstruction.  No AAA.  No acute cholecystitis.  No obstructive uropathy.  There was no laboratory evidence of pancreatitis.  Suspect gastritis.  Possibly peptic ulcer disease.  Appropriate for discharge and outpatient management.    Amount and/or Complexity of Data Reviewed  Labs: ordered. Decision-making details documented in ED Course.  Radiology: ordered and independent interpretation performed. Decision-making details documented in ED Course.  ECG/medicine tests: ordered and independent interpretation performed. Decision-making details documented in ED Course.    Risk  OTC drugs.  Decision regarding hospitalization.             Medications   aluminum-magnesium hydroxide-simethicone (MAALOX) oral suspension 30 mL (30 mL Oral Given 5/19/25 1930)       ED Risk Strat Scores   HEART Risk Score      Flowsheet Row Most Recent Value   Heart Score Risk Calculator    History 0 Filed at: 05/19/2025 2305   ECG 0 Filed at: 05/19/2025 2305   Age 1 Filed at: 05/19/2025 2305    Risk Factors 2 Filed at: 2025   Troponin 0 Filed at: 2025   HEART Score 3 Filed at: 2025          HEART Risk Score      Flowsheet Row Most Recent Value   Heart Score Risk Calculator    History 0 Filed at: 2025   ECG 0 Filed at: 2025   Age 1 Filed at: 2025   Risk Factors 2 Filed at: 2025   Troponin 0 Filed at: 2025   HEART Score 3 Filed at: 2025                      No data recorded        SBIRT 22yo+      Flowsheet Row Most Recent Value   Initial Alcohol Screen: US AUDIT-C     1. How often do you have a drink containing alcohol? 0 Filed at: 2025   2. How many drinks containing alcohol do you have on a typical day you are drinking?  0 Filed at: 2025   3b. FEMALE Any Age, or MALE 65+: How often do you have 4 or more drinks on one occassion? 0 Filed at: 2025   Audit-C Score 0 Filed at: 2025   GLORIA: How many times in the past year have you...    Used an illegal drug or used a prescription medication for non-medical reasons? Never Filed at: 2025                            History of Present Illness       Chief Complaint   Patient presents with    Chest Pain     Pt reports chest pressure and epigastric pain since this afternoon. Denies any other symptoms. Reports BP was high at home       Past Medical History:   Diagnosis Date    BRCA1 negative     BRCA2 negative     Bulging lumbar disc     L1-L2    Endometrial hyperplasia     endometrial polyp    Factor V deficiency (HCC) 2005    Factor V Leiden (HCC)     GERD (gastroesophageal reflux disease)     Hydrops     eye    RSD lower limb       Past Surgical History:   Procedure Laterality Date    BREAST BIOPSY Left 2023    stereo- adh    BREAST LUMPECTOMY Left 2023    done at [facility]- non invasive     SECTION      EGD  2020    Dr. Calderon.  Symptoms of reflux after antibiotics.  Biopsies negative for  H. pylori, negative eosinophilic esophagitis, positive for reflux esophagitis.    FOOT SURGERY      MAMMO NEEDLE LOCALIZATION LEFT (ALL INC) Left 8/22/2023    MAMMO STEREOTACTIC BREAST BIOPSY LEFT (ALL INC) Left 07/18/2023    MOUTH SURGERY      GA EXC TUMOR SOFT TISS FACE&SCALP SUBFASCIAL <2CM N/A 03/09/2017    Procedure: EXCISION FOREHEAD MASS;  Surgeon: Ben Guerra MD;  Location: [facility];  Service: Plastics    TUBAL LIGATION        Family History   Problem Relation Age of Onset    Colon polyps Mother     Diabetes Mother     Hypertension Mother     Aneurysm Father     Hypertension Father     Heart disease Father     Stroke Father     No Known Problems Daughter     Breast cancer Maternal Grandmother         unsure what age    No Known Problems Maternal Grandfather     No Known Problems Paternal Grandmother     No Known Problems Paternal Grandfather     Thyroid cancer Brother     Breast cancer Maternal Aunt 75        mothers twin sister    No Known Problems Maternal Aunt     No Known Problems Maternal Aunt     No Known Problems Maternal Aunt     Breast cancer Other 36        maternal    Colon cancer Neg Hx     Endometrial cancer Neg Hx     Ovarian cancer Neg Hx       Social History[1]   E-Cigarette/Vaping    E-Cigarette Use Never User       E-Cigarette/Vaping Substances    Nicotine No     THC No     CBD No     Flavoring No     Other No     Unknown No       I have reviewed and agree with the history as documented.     Patient is a 54-year-old female.  Around 3 PM today she developed epigastric and lower chest pain.  She does have a history of GERD.  She took Pepcid today without relief.  She also took Bentyl without relief.  She reports that this is a tightness/burning pain.  There is no radiation.  No back pain or migration.  No cough or hemoptysis.  No calf pain or unilateral leg swelling.  No nausea, shortness of breath, or diaphoresis.  No fever or chills.  Cardiac risk factors include obesity,  hypertension, hyperlipidemia, and family history.  Patient is a non-smoker.  No diabetes.  History of breast cancer with mastectomy 1 year ago.        Review of Systems   Constitutional:  Negative for chills and fever.   HENT:  Negative for rhinorrhea and sore throat.    Eyes:  Negative for pain, redness and visual disturbance.   Respiratory:  Negative for cough and shortness of breath.    Cardiovascular:  Positive for chest pain. Negative for leg swelling.   Gastrointestinal:  Positive for abdominal pain. Negative for diarrhea and vomiting.   Endocrine: Negative for polydipsia and polyuria.   Genitourinary:  Negative for dysuria, frequency, hematuria, vaginal bleeding and vaginal discharge.   Musculoskeletal:  Negative for back pain and neck pain.   Skin:  Negative for rash and wound.   Allergic/Immunologic: Negative for immunocompromised state.   Neurological:  Negative for weakness, numbness and headaches.   Hematological:  Does not bruise/bleed easily.   Psychiatric/Behavioral:  Negative for hallucinations and suicidal ideas.    All other systems reviewed and are negative.          Objective       ED Triage Vitals   Temperature Pulse Blood Pressure Respirations SpO2 Patient Position - Orthostatic VS   05/19/25 1801 05/19/25 1801 05/19/25 1801 05/19/25 1801 05/19/25 1801 05/19/25 1801   97.9 °F (36.6 °C) 81 (!) 194/100 17 100 % Sitting      Temp src Heart Rate Source BP Location FiO2 (%) Pain Score    -- 05/19/25 1932 05/19/25 1801 -- --     Monitor Right arm        Vitals      Date and Time Temp Pulse SpO2 Resp BP Pain Score FACES Pain Rating User   05/19/25 2230 -- 72 99 % 15 156/72 -- -- AS   05/19/25 2130 -- 77 98 % 21 162/79 -- -- AS   05/19/25 2100 -- 76 97 % 17 159/75 -- -- AS   05/19/25 2033 -- -- 98 % -- 158/78 -- -- AS   05/19/25 2000 -- 76 99 % 16 184/83 -- -- AS   05/19/25 1932 -- 82 99 % 19 182/86 -- -- AS   05/19/25 1801 97.9 °F (36.6 °C) 81 100 % 17 194/100 -- -- LP            Physical Exam  Vitals  reviewed.   Constitutional:       General: She is not in acute distress.     Appearance: She is obese.   HENT:      Head: Normocephalic and atraumatic.      Nose: Nose normal.      Mouth/Throat:      Mouth: Mucous membranes are moist.     Eyes:      General:         Right eye: No discharge.         Left eye: No discharge.      Conjunctiva/sclera: Conjunctivae normal.       Cardiovascular:      Rate and Rhythm: Normal rate and regular rhythm.      Pulses: Normal pulses.      Heart sounds: Normal heart sounds. No murmur heard.     No friction rub. No gallop.   Pulmonary:      Effort: Pulmonary effort is normal. No respiratory distress.      Breath sounds: Normal breath sounds. No stridor. No decreased breath sounds, wheezing, rhonchi or rales.   Abdominal:      General: Bowel sounds are normal. There is no distension.      Palpations: Abdomen is soft.      Tenderness: There is abdominal tenderness. There is no right CVA tenderness, left CVA tenderness, guarding or rebound.      Comments: There is tenderness to the epigastrium.  No Knight sign.  No peritoneal signs.     Musculoskeletal:         General: No swelling, tenderness, deformity or signs of injury. Normal range of motion.      Cervical back: Normal range of motion and neck supple. No rigidity.      Right lower leg: No edema.      Left lower leg: No edema.      Comments: No calf tenderness or unilateral leg swelling.     Skin:     General: Skin is warm and dry.      Coloration: Skin is not jaundiced.      Findings: No rash.     Neurological:      General: No focal deficit present.      Mental Status: She is alert and oriented to person, place, and time.      Sensory: No sensory deficit.      Motor: Motor function is intact.     Psychiatric:         Mood and Affect: Mood normal.         Behavior: Behavior normal.         Results Reviewed       Procedure Component Value Units Date/Time    Lipase [[id number]]  (Normal) Collected: 05/19/25 1815    Lab Status: Final  result Specimen: Blood from Arm, Right Updated: 05/19/25 2006     Lipase 49 u/L     HS Troponin 0hr (reflex protocol) [[id number]]  (Normal) Collected: 05/19/25 1815    Lab Status: Final result Specimen: Blood from Arm, Right Updated: 05/19/25 1853     hs TnI 0hr <2 ng/L     Comprehensive metabolic panel [[id number]] Collected: 05/19/25 1815    Lab Status: Final result Specimen: Blood from Arm, Right Updated: 05/19/25 1843     Sodium 137 mmol/L      Potassium 3.6 mmol/L      Chloride 104 mmol/L      CO2 27 mmol/L      ANION GAP 6 mmol/L      BUN 14 mg/dL      Creatinine 0.78 mg/dL      Glucose 95 mg/dL      Calcium 9.4 mg/dL      AST 16 U/L      ALT 20 U/L      Alkaline Phosphatase 63 U/L      Total Protein 8.0 g/dL      Albumin 4.2 g/dL      Total Bilirubin 0.56 mg/dL      eGFR 87 ml/min/1.73sq m     Narrative:      National Kid[employer] guidelines for Chronic Kidney Disease (CKD):     Stage 1 with normal or high GFR (GFR > 90 mL/min/1.73 square meters)    Stage 2 Mild CKD (GFR = 60-89 mL/min/1.73 square meters)    Stage 3A Moderate CKD (GFR = 45-59 mL/min/1.73 square meters)    Stage 3B Moderate CKD (GFR = 30-44 mL/min/1.73 square meters)    Stage 4 Severe CKD (GFR = 15-29 mL/min/1.73 square meters)    Stage 5 End Stage CKD (GFR <15 mL/min/1.73 square meters)  Note: GFR calculation is accurate only with a steady state creatinine    CBC and differential [[id number]] Collected: 05/19/25 1815    Lab Status: Final result Specimen: Blood from Arm, Right Updated: 05/19/25 1833     WBC 5.86 Thousand/uL      RBC 4.38 Million/uL      Hemoglobin 13.2 g/dL      Hematocrit 40.2 %      MCV 92 fL      MCH 30.1 pg      MCHC 32.8 g/dL      RDW 13.0 %      MPV 10.7 fL      Platelets 303 Thousands/uL      nRBC 0 /100 WBCs      Segmented % 59 %      Immature Grans % 0 %      Lymphocytes % 28 %      Monocytes % 9 %      Eosinophils Relative 4 %      Basophils Relative 0 %      Absolute Neutrophils 3.48 Thousands/µL       Absolute Immature Grans 0.01 Thousand/uL      Absolute Lymphocytes 1.63 Thousands/µL      Absolute Monocytes 0.51 Thousand/µL      Eosinophils Absolute 0.22 Thousand/µL      Basophils Absolute 0.01 Thousands/µL             CT abdomen pelvis wo contrast   Final Interpretation by Harjit Pelaez DO ([id number])      No acute findings in the abdomen or pelvis within the limits of unenhanced technique to include small bowel obstruction, colitis, acute appendicitis, or obstructive uropathy.      There is partially visualized asymmetric skin thickening overlying the left breast, which is nonspecific. Correlate clinically and with direct visualization. Findings may be attributed to lumpectomy changes, however possible inflammatory process is in    the differential.      Fibroid uterus.      Generalized hepatic steatosis      Workstation performed: [id number]         XR chest 2 views   ED Interpretation by Zhao Jewell MD ([id number])   No infiltrates.  No CHF.  No pneumothorax.  No widened mediastinum.  No free air.          ECG 12 Lead Documentation Only    Date/Time: 2025 7:04 PM    Performed by: Zhao Jewell MD  Authorized by: Zhao Jewell MD    ECG reviewed by me, the ED Provider: yes    Patient location:  ED  Interpretation:     Interpretation: normal    Rate:     ECG rate assessment: normal    Rhythm:     Rhythm: sinus rhythm    Ectopy:     Ectopy: none    QRS:     QRS axis:  Normal  Conduction:     Conduction: normal    ST segments:     ST segments:  Normal  T waves:     T waves: normal        ED Medication and Procedure Management   Prior to Admission Medications   Prescriptions Last Dose Informant Patient Reported? Taking?   ALPRAZolam (XANAX) 0.25 mg tablet  Self Yes No   Si.25 mg   Aklief 0.005 % CREA  Self Yes No   Sig: Apply topically   Semaglutide-Weight Management (WEGOVY) 1 MG/0.5ML  Self Yes No   Sig: Inject under the skin   Tazorac 0.05 % gel  Self Yes No   Sig: APPLY TOPICALLY TO  ENTIRE FACE EVERY NIGHT BRAND NECESSARY   acyclovir (ZOVIRAX) 5 % cream  Self Yes No   Sig: APPLY TO AFFECTED AREA 5 TIMES EVERY DAY   amLODIPine (NORVASC) 5 mg tablet   No No   Sig: Take 1 tablet (5 mg total) by mouth daily   benzonatate (TESSALON PERLES) 100 mg capsule  Self No No   Sig: Take 1 capsule (100 mg total) by mouth 3 (three) times a day as needed for cough   Patient not taking: Reported on 2025   carboxymethylcellulose (REFRESH PLUS) 0.5 % SOLN  Self Yes No   Sig: Apply 1 drop to eye   Patient not taking: Reported on 2025   cycloSPORINE (RESTASIS) 0.05 % ophthalmic emulsion  Self Yes No   Sig: Administer 1 drop to both eyes every 12 (twelve) hours   Patient not taking: Reported on 2024   famotidine (PEPCID) 40 MG tablet  Self No No   Sig: Take 1 tablet (40 mg total) by mouth 2 (two) times a day   ferrous sulfate 324 (65 Fe) mg  Self No No   Sig: Take 1 tablet (324 mg total) by mouth daily before breakfast Do not start before 2022.   fluticasone (FLONASE) 50 mcg/act nasal spray  Self No No   Si sprays into each nostril daily   Patient not taking: Reported on 2025   hydrochlorothiazide (HYDRODIURIL) 12.5 mg tablet  Self Yes No   Sig: Take 12.5 mg by mouth daily   Patient not taking: Reported on 10/2/2023   metoprolol succinate (TOPROL-XL) 50 mg 24 hr tablet  Self No No   Sig: Take 1 tablet (50 mg total) by mouth 2 (two) times a day   Patient taking differently: Take 50 mg by mouth daily 25mg   multivitamin (THERAGRAN) TABS  Self Yes No   Sig: Take 1 tablet by mouth every evening   sertraline (ZOLOFT) 50 mg tablet  Self Yes No   Sig: daily   tobramycin-dexamethasone (TOBRADEX) ophthalmic suspension  Self Yes No   Patient not taking: Reported on 10/2/2023   tretinoin (RETIN-A) 0.05 % cream  Self Yes No   Sig: APPLY THIN LAYER TOPICALLY TO CLEAN, DRY FACE EVERY NIGHT AT BEDTIME      Facility-Administered Medications: None     Patient's Medications   Discharge  Prescriptions    SUCRALFATE (CARAFATE) 1 G TABLET    Take 1 tablet (1 g total) by mouth 4 (four) times a day       Start Date: 2025 End Date: --       Order Dose: 1 g       Quantity: 120 tablet    Refills: 0       ED SEPSIS DOCUMENTATION   Time reflects when diagnosis was documented in both MDM as applicable and the Disposition within this note       Time User Action Codes Description Comment    2025 11:24 PM Zhao Jewell [R07.9] Chest pain, unspecified     2025 11:24 PM Zhao Jewell [K29.70] Gastritis                      [1]   Social History  Tobacco Use    Smoking status: Former     Current packs/day: 0.00     Types: Cigarettes     Start date:      Quit date:      Years since quittin.3    Smokeless tobacco: Never   Vaping Use    Vaping status: Never Used   Substance Use Topics    Alcohol use: Not Currently     Comment: rarely    Drug use: No        Zhao Jewell MD  [id number]

## 2025-05-20 LAB
ATRIAL RATE: 81 BPM
P AXIS: 50 DEGREES
PR INTERVAL: 156 MS
QRS AXIS: 56 DEGREES
QRSD INTERVAL: 90 MS
QT INTERVAL: 392 MS
QTC INTERVAL: 455 MS
T WAVE AXIS: 58 DEGREES
VENTRICULAR RATE: 81 BPM

## 2025-05-20 PROCEDURE — 93010 ELECTROCARDIOGRAM REPORT: CPT | Performed by: INTERNAL MEDICINE

## 2025-07-17 ENCOUNTER — OFFICE VISIT (OUTPATIENT)
Dept: CARDIOLOGY CLINIC | Facility: CLINIC | Age: 55
End: 2025-07-17
Payer: COMMERCIAL

## 2025-07-17 VITALS
OXYGEN SATURATION: 96 % | SYSTOLIC BLOOD PRESSURE: 134 MMHG | DIASTOLIC BLOOD PRESSURE: 84 MMHG | BODY MASS INDEX: 32.26 KG/M2 | WEIGHT: 206 LBS | HEART RATE: 87 BPM

## 2025-07-17 DIAGNOSIS — R00.2 PALPITATIONS: ICD-10-CM

## 2025-07-17 DIAGNOSIS — E78.5 DYSLIPIDEMIA: ICD-10-CM

## 2025-07-17 DIAGNOSIS — I10 PRIMARY HYPERTENSION: Primary | ICD-10-CM

## 2025-07-17 PROCEDURE — 99214 OFFICE O/P EST MOD 30 MIN: CPT | Performed by: INTERNAL MEDICINE

## 2025-07-17 RX ORDER — AMLODIPINE BESYLATE 5 MG/1
5 TABLET ORAL DAILY
Qty: 90 TABLET | Refills: 3 | Status: SHIPPED | OUTPATIENT
Start: 2025-07-17

## 2025-07-17 NOTE — PROGRESS NOTES
Cardiology   Mariola Briscoe 54 y.o. female MRN: [id number]        Impression:  1. Hypertension - not adequate control.  Will continue metoprolol and start amlodipine 5mg daily.   2. Dyslipidemia - .  HDL 31  3. Palpitations - at night.     Recommendations:  1. Continue current medications.   2. Check fasting lipid panel and complete metabolic profile.   3. Follow up in 6 months.       HPI: Mariola Briscoe is a 54 y.o. year old female with hypertension and dyslipidemia who presents for follow up. No chest pain or shortness of breath. Echo demonstrated normal LV systolic function, mild LVH. No headaches.  No chest pain, shortness of breath.  Rare palpitations.         Review of Systems   Constitutional: Negative.    HENT: Negative.     Eyes: Negative.    Respiratory:  Negative for chest tightness and shortness of breath.    Cardiovascular:  Negative for chest pain, palpitations and leg swelling.   Gastrointestinal: Negative.    Endocrine: Negative.    Genitourinary: Negative.    Musculoskeletal: Negative.    Skin: Negative.    Allergic/Immunologic: Negative.    Neurological: Negative.    Hematological: Negative.    Psychiatric/Behavioral: Negative.     All other systems reviewed and are negative.        Past Medical History[1]  Past Surgical History[2]  Social History     Substance and Sexual Activity   Alcohol Use Not Currently    Comment: rarely     Social History     Substance and Sexual Activity   Drug Use No     Tobacco Use History[3]  Family History[4]    Allergies:  Allergies[5]    Medications:   Current Medications[6]      Wt Readings from Last 3 Encounters:   07/17/25 93.4 kg (206 lb)   05/19/25 93.2 kg (205 lb 7.5 oz)   03/18/25 92.5 kg (204 lb)     Temp Readings from Last 3 Encounters:   05/19/25 97.9 °F (36.6 °C)   01/31/24 (!) 97.3 °F (36.3 °C) (Temporal)   11/13/23 98.6 °F (37 °C) (Oral)     BP Readings from Last 3 Encounters:   07/17/25 134/84   05/19/25 156/72   03/18/25 (!) 170/110  "    Pulse Readings from Last 3 Encounters:   25 87   25 72   25 97         Physical Exam  HENT:      Head: Atraumatic.      Mouth/Throat:      Mouth: Mucous membranes are moist.     Eyes:      Extraocular Movements: Extraocular movements intact.       Cardiovascular:      Rate and Rhythm: Normal rate and regular rhythm.      Heart sounds: Normal heart sounds.   Pulmonary:      Effort: Pulmonary effort is normal.      Breath sounds: Normal breath sounds.   Abdominal:      General: Abdomen is flat.     Musculoskeletal:         General: Normal range of motion.      Cervical back: Normal range of motion.     Skin:     General: Skin is warm.     Neurological:      General: No focal deficit present.      Mental Status: She is alert and oriented to person, place, and time.     Psychiatric:         Mood and Affect: Mood normal.         Behavior: Behavior normal.           Laboratory Studies:  CMP:  Lab Results   Component Value Date    K 3.6 2025     2025    CO2 27 2025    BUN 14 2025    CREATININE 0.78 2025    AST 16 2025    ALT 20 2025    EGFR 87 2025       Lipid Profile:   No results found for: \"CHOL\"  Lab Results   Component Value Date    HDL 44 2020     Lab Results   Component Value Date    LDLCALC 187 (H) 2020     Lab Results   Component Value Date    TRIG 184 (H) 2020                      [1]   Past Medical History:  Diagnosis Date    BRCA1 negative     BRCA2 negative     Bulging lumbar disc     L1-L2    Endometrial hyperplasia     endometrial polyp    Factor V deficiency (HCC) 2005    Factor V Leiden ([facility])     GERD (gastroesophageal reflux disease)     Hydrops     eye    RSD lower limb    [2]   Past Surgical History:  Procedure Laterality Date    BREAST BIOPSY Left 2023    stereo- adh    BREAST LUMPECTOMY Left 2023    done at [facility]- non invasive     SECTION      EGD  2020    Dr. Calderon.  Symptoms of " reflux after antibiotics.  Biopsies negative for H. pylori, negative eosinophilic esophagitis, positive for reflux esophagitis.    FOOT SURGERY      MAMMO NEEDLE LOCALIZATION LEFT (ALL INC) Left 2023    MAMMO STEREOTACTIC BREAST BIOPSY LEFT (ALL INC) Left 2023    MOUTH SURGERY      KY EXC TUMOR SOFT TISS FACE&SCALP SUBFASCIAL <2CM N/A 2017    Procedure: EXCISION FOREHEAD MASS;  Surgeon: Ben Guerra MD;  Location: [facility];  Service: Plastics    TUBAL LIGATION     [3]   Social History  Tobacco Use   Smoking Status Former    Current packs/day: 0.00    Types: Cigarettes    Start date:     Quit date:     Years since quittin.5   Smokeless Tobacco Never   [4]   Family History  Problem Relation Name Age of Onset    Colon polyps Mother Mom     Diabetes Mother Mom     Hypertension Mother Mom     Aneurysm Father Dad     Hypertension Father Dad     Heart disease Father Dad     Stroke Father Dad     No Known Problems Daughter      Breast cancer Maternal Grandmother          unsure what age    No Known Problems Maternal Grandfather      No Known Problems Paternal Grandmother      No Known Problems Paternal Grandfather      Thyroid cancer Brother      Breast cancer Maternal Aunt rachel 75        mothers twin sister    No Known Problems Maternal Aunt harika     No Known Problems Maternal Aunt tien     No Known Problems Maternal Aunt riddhi     Breast cancer Other 1st cousin 36        maternal    Colon cancer Neg Hx      Endometrial cancer Neg Hx      Ovarian cancer Neg Hx     [5]   Allergies  Allergen Reactions    Pantoprazole Hives and Anaphylaxis    Epinephrine Other (See Comments)     Numbness feeling throughout body    Diflucan [Fluconazole]     Erythromycin GI Intolerance    Nickel Other (See Comments)     infection    Tetracycline Vomiting    Cefpodoxime Rash    Iodinated Contrast Media Rash     Patient stated rash on her  arm where they injected the  dye   [6]   Current Outpatient  Medications:     acyclovir (ZOVIRAX) 5 % cream, , Disp: , Rfl:     Aklief 0.005 % CREA, Apply topically, Disp: , Rfl:     ALPRAZolam (XANAX) 0.25 mg tablet, 0.25 mg, Disp: , Rfl:     amLODIPine (NORVASC) 5 mg tablet, Take 1 tablet (5 mg total) by mouth daily, Disp: 90 tablet, Rfl: 3    famotidine (PEPCID) 40 MG tablet, Take 1 tablet (40 mg total) by mouth 2 (two) times a day, Disp: 60 tablet, Rfl: 3    ferrous sulfate 324 (65 Fe) mg, Take 1 tablet (324 mg total) by mouth daily before breakfast Do not start before December 26, 2022., Disp: 30 tablet, Rfl: 0    metoprolol succinate (TOPROL-XL) 50 mg 24 hr tablet, Take 1 tablet (50 mg total) by mouth 2 (two) times a day (Patient taking differently: Take 25 mg by mouth in the morning. 25mg .), Disp: 180 tablet, Rfl: 3    multivitamin (THERAGRAN) TABS, Take 1 tablet by mouth every evening, Disp: , Rfl:     Semaglutide-Weight Management (WEGOVY) 1 MG/0.5ML, Inject under the skin, Disp: , Rfl:     sertraline (ZOLOFT) 50 mg tablet, in the morning., Disp: , Rfl:     sucralfate (CARAFATE) 1 g tablet, Take 1 tablet (1 g total) by mouth 4 (four) times a day, Disp: 120 tablet, Rfl: 0    Tazorac 0.05 % gel, , Disp: , Rfl:     tretinoin (RETIN-A) 0.05 % cream, , Disp: , Rfl:     benzonatate (TESSALON PERLES) 100 mg capsule, Take 1 capsule (100 mg total) by mouth 3 (three) times a day as needed for cough (Patient not taking: Reported on 2/24/2025), Disp: 20 capsule, Rfl: 0    carboxymethylcellulose (REFRESH PLUS) 0.5 % SOLN, Apply 1 drop to eye (Patient not taking: Reported on 2/24/2025), Disp: , Rfl:     cycloSPORINE (RESTASIS) 0.05 % ophthalmic emulsion, Administer 1 drop to both eyes every 12 (twelve) hours (Patient not taking: Reported on 12/28/2024), Disp: , Rfl:     fluticasone (FLONASE) 50 mcg/act nasal spray, 2 sprays into each nostril daily (Patient not taking: Reported on 2/24/2025), Disp: 16 g, Rfl: 0    hydrochlorothiazide (HYDRODIURIL) 12.5 mg tablet, Take 12.5 mg by  mouth daily (Patient not taking: Reported on 10/2/2023), Disp: , Rfl:     tobramycin-dexamethasone (TOBRADEX) ophthalmic suspension, , Disp: , Rfl:

## 2025-07-17 NOTE — PATIENT INSTRUCTIONS
Recommendations:  1. Continue current medications.   2. Check fasting lipid panel and complete metabolic profile.   3. Follow up in 6 months.

## (undated) DEVICE — SKIN MARKER DUAL TIP WITH RULER CAP, FLEXIBLE RULER AND LABELS: Brand: DEVON

## (undated) DEVICE — NEEDLE 27 G X 1 1/4

## (undated) DEVICE — INTENDED FOR TISSUE SEPARATION, AND OTHER PROCEDURES THAT REQUIRE A SHARP SURGICAL BLADE TO PUNCTURE OR CUT.: Brand: BARD-PARKER SAFETY BLADES SIZE 15, STERILE

## (undated) DEVICE — SUT MONOCRYL 6-0 P-3 18 IN Y492G

## (undated) DEVICE — SUT CHROMIC 6-0 G-1 18 IN 796G

## (undated) DEVICE — 3M™ STERI-STRIP™ REINFORCED ADHESIVE SKIN CLOSURES, R1547, 1/2 IN X 4 IN (12 MM X 100 MM), 6 STRIPS/ENVELOPE: Brand: 3M™ STERI-STRIP™

## (undated) DEVICE — GAUZE SPONGES,16 PLY: Brand: CURITY

## (undated) DEVICE — MICRODISSECTION NEEDLE STRAIGHT SLEEVE: Brand: COLORADO

## (undated) DEVICE — GLOVE SRG BIOGEL 7

## (undated) DEVICE — 3M™ STERI-STRIP™ COMPOUND BENZOIN TINCTURE 40 BAGS/CARTON 4 CARTONS/CASE C1544: Brand: 3M™ STERI-STRIP™